# Patient Record
Sex: MALE | Race: WHITE | Employment: OTHER | URBAN - METROPOLITAN AREA
[De-identification: names, ages, dates, MRNs, and addresses within clinical notes are randomized per-mention and may not be internally consistent; named-entity substitution may affect disease eponyms.]

---

## 2017-01-04 ENCOUNTER — OFFICE VISIT (OUTPATIENT)
Dept: AUDIOLOGY | Facility: CLINIC | Age: 82
End: 2017-01-04
Payer: COMMERCIAL

## 2017-01-04 DIAGNOSIS — H90.5 SENSORINEURAL HEARING LOSS OF RIGHT EAR: ICD-10-CM

## 2017-01-04 DIAGNOSIS — H90.72 MIXED HEARING LOSS OF LEFT EAR: Primary | ICD-10-CM

## 2017-01-04 PROCEDURE — V5299 HEARING SERVICE: HCPCS | Performed by: AUDIOLOGIST

## 2017-01-04 PROCEDURE — 99207 ZZC NO CHARGE LOS: CPT | Performed by: AUDIOLOGIST

## 2017-01-04 NOTE — PROGRESS NOTES
Hearing Aid Check    SUBJECTIVE:  Aman Baker is a 82 year old male, was seen today to exchange his Phonak Audeo B50-R hearing aids for Audeo B50-13. Aman would like to exchange the hearing aids because he wants the flexibility of using traditional hearing aid batteries.        OBJECTIVE:  Otoscopy revealed clear ear canals bilaterally.     The new hearing aids were programmed based on the settings/gain of his rechargeable hearing aids. Real ear measures did not need to be completed today because his Cshell molds were moved to the new hearing aids.     Volume control was activated; the program button was deactivated.     A new purchase agreement was signed today. No charges for today's appointment as this was an exchange. The rechargeable hearing aids will be returned for credit.       PLAN: Return to clinic in 3 weeks for a hearing aid check.       Noemy Parnell, F-AAA   Clinical Audiologist, [place] #1008   1/4/2017

## 2017-01-25 ENCOUNTER — PRE VISIT (OUTPATIENT)
Dept: CARDIOLOGY | Facility: CLINIC | Age: 82
End: 2017-01-25

## 2017-01-25 NOTE — TELEPHONE ENCOUNTER
Last Assessment & Plan:     1. Chronic atrial fibrillation: s/p DC cardioversion 10/3/08, in atrial fibrillation since 2/7/09.    2. Asymptomatic, has tolerated medical mangement and chronic atrial fibrillation well and his INR level is therapeutic.    3. RTC in 1 year. He has regular appointments for routine health maintenance (BP, lipids) with his PCP. No changes to medications today.    Cullen Davis MD, PhD      Chart prep completed; patient is up to date on requested cardiac/lab testing. No further information or testing needed at this time.   MELISSA Silverman.M.A.

## 2017-01-27 ENCOUNTER — OFFICE VISIT (OUTPATIENT)
Dept: CARDIOLOGY | Facility: CLINIC | Age: 82
End: 2017-01-27
Payer: COMMERCIAL

## 2017-01-27 VITALS
OXYGEN SATURATION: 97 % | WEIGHT: 149 LBS | BODY MASS INDEX: 24.79 KG/M2 | HEART RATE: 75 BPM | SYSTOLIC BLOOD PRESSURE: 102 MMHG | DIASTOLIC BLOOD PRESSURE: 58 MMHG

## 2017-01-27 DIAGNOSIS — I48.20 CHRONIC ATRIAL FIBRILLATION (H): Primary | ICD-10-CM

## 2017-01-27 PROCEDURE — 99213 OFFICE O/P EST LOW 20 MIN: CPT | Performed by: INTERNAL MEDICINE

## 2017-01-27 RX ORDER — FUROSEMIDE 20 MG
20 TABLET ORAL EVERY OTHER DAY
Qty: 30 TABLET | Refills: 3 | Status: SHIPPED | OUTPATIENT
Start: 2017-01-27 | End: 2018-03-23

## 2017-01-27 ASSESSMENT — PAIN SCALES - GENERAL: PAINLEVEL: NO PAIN (0)

## 2017-01-27 NOTE — PATIENT INSTRUCTIONS
1.  Dr. Cullen Davis would like you to return for a cardiac follow up in 18 months (June 2018).  We will contact you regarding your appointment when the time draws closer or you may call [phone] to arrange an appointment.  Mean while, if you should have any questions or concerns regarding your heart health, please contact us.  Thank you for choosing [facility] for your care.      [facility] Cardiology - [place] Location    If you have any questions regarding to your visit please contact your care team:     Cardiology  Telephone Number   Randell Garza  Cardiology RN's.    Ciara Austin CMA [phone]    After hours: [phone].  [phone]   For scheduling appts:     [phone] or  [phone]    After hours: [phone]   For the Device Clinic (Pacemakers and ICD's)  RN's :  Kathleen Harrell   During business hours: [phone]  After business hours:  [phone]- select option 4.      If you need a medication refill please contact your pharmacy.  Please allow 3 business days for your refill to be completed.    As always, Thank you for trusting us with your health care needs!  _____________________________________________________________________

## 2017-01-27 NOTE — MR AVS SNAPSHOT
After Visit Summary   1/27/2017    Aman Baker    MRN: [id number]           Patient Information     Date Of Birth          5/6/1934        Visit Information        Provider Department      1/27/2017 8:30 AM Cullen Davis MD [facility] PHYSICIANS HEART AT [facility]        Today's Diagnoses     Chronic atrial fibrillation (H)    -  1       Care Instructions    1.  Dr. Cullen Davis would like you to return for a cardiac follow up in 18 months (June 2018).  We will contact you regarding your appointment when the time draws closer or you may call [phone] to arrange an appointment.  Mean while, if you should have any questions or concerns regarding your heart health, please contact us.  Thank you for choosing [facility] for your care.      [facility] Cardiology [facility] [place] [facility]    If you have any questions regarding to your visit please contact your care team:     Cardiology  Telephone Number   Randell Garza  Cardiology RN's.    Ciara Austin CMA [phone]    After hours: [phone].  [phone]   For scheduling appts:     [phone] or  [phone]    After hours: [phone]   For the Device Clinic (Pacemakers and ICD's)  RN's :  Kathleen Harrell   During business hours: [phone]  After business hours:  [phone]- select option 4.      If you need a medication refill please contact your pharmacy.  Please allow 3 business days for your refill to be completed.    As always, Thank you for trusting us with your health care needs!  _____________________________________________________________________            Follow-ups after your visit        Your next 10 appointments already scheduled     Feb 01, 2017 10:30 AM   Return Visit with GLADYS Parnell   [facility]    [address] [place] [id number] [phone]              Who to contact     If you have questions  "or need follow up information about today's clinic visit or your schedule please contact [facility] PHYSICIANS HEART AT [facility] directly at [phone].  Normal or non-critical lab and imaging results will be communicated to you by MyChart, letter or phone within 4 business days after the clinic has received the results. If you do not hear from us within 7 days, please contact the clinic through [employer] or phone. If you have a critical or abnormal lab result, we will notify you by phone as soon as possible.  Submit refill requests through [employer] or call your pharmacy and they will forward the refill request to us. Please allow 3 business days for your refill to be completed.          Additional Information About Your Visit        [employer] Information     [employer] lets you send messages to your doctor, view your test results, renew your prescriptions, schedule appointments and more. To sign up, go to [email or web address] . Click on \"Log in\" on the left side of the screen, which will take you to the Welcome page. Then click on \"Sign up Now\" on the right side of the page.     You will be asked to enter the access code listed below, as well as some personal information. Please follow the directions to create your username and password.     Your access code is: [email or web address]  Expires: 2017  9:07 AM     Your access code will  in 90 days. If you need help or a new code, please call your [place] clinic or [phone].        Care EveryWhere ID     This is your Care EveryWhere ID. This could be used by other organizations to access your [place] medical records  [id number]        Your Vitals Were     Pulse Pulse Oximetry                75 97%           Blood Pressure from Last 3 Encounters:   17 102/58   16 94/60   12/14/15 111/66    Weight from Last 3 Encounters:   17 67.586 kg (149 lb)   16 67.223 kg (148 lb 3.2 oz)   16 67.405 kg (148 lb 9.6 oz)            "   Today, you had the following     No orders found for display         Where to get your medicines      These medications were sent to [facility] [address] [facility] - [place], [place] - [address]  [address], [place] [address]     Phone:  [phone]    - furosemide 20 MG tablet       Primary Care Provider Office Phone # Fax #    Slade Eldridge [phone]       [facility] [address] [place] [address]        Thank you!     Thank you for choosing [facility] PHYSICIANS HEART AT [facility]  for your care. Our goal is always to provide you with excellent care. Hearing back from our patients is one way we can continue to improve our services. Please take a few minutes to complete the written survey that you may receive in the mail after your visit with us. Thank you!             Your Updated Medication List - Protect others around you: Learn how to safely use, store and throw away your medicines at www.disposemymeds.org.          This list is accurate as of: 1/27/17  9:07 AM.  Always use your most recent med list.                   Brand Name Dispense Instructions for use    calcitRIOL 0.25 MCG capsule    ROCALTROL     Take 1 capsule by mouth daily.       CALCIUM + D PO      Take 1 capsule daily       DAILY MULTIVITAMIN PO      Take 1 tablet daily       furosemide 20 MG tablet    LASIX    30 tablet    Take 1 tablet (20 mg) by mouth every other day       gabapentin 300 MG 24 hr tablet    GRALISE     Take 1 tablet twice daily       levothyroxine 50 MCG tablet    SYNTHROID/LEVOTHROID     Take 1 tablet by mouth daily.       magnesium 250 MG tablet      Take 1 tablet by mouth daily.       metoprolol 100 MG tablet    LOPRESSOR    180 tablet    Take 1 tablet (100 mg) by mouth 2 times daily       mycophenolate capsule     540 capsule    TAKE THREE CAPSULES BY MOUTH TWICE A DAY (ARIEL 1)       predniSONE 5 MG tablet    DELTASONE    90 tablet    TAKE ONE TABLET BY MOUTH EVERY DAY        simvastatin 20 MG tablet    ZOCOR    90 tablet    Take 1 tablet (20 mg) by mouth At Bedtime       UNABLE TO FIND      MEDICATION NAME: instaflex       vitamin D 1000 UNITS capsule      Take 1 capsule by mouth 2 times daily.       * warfarin 2.5 MG tablet    COUMADIN     Take 1 tablet daily as directed by the coumadin clinic       * COUMADIN 1 MG tablet   Generic drug:  warfarin      Take 1 tablet by mouth daily 2.5 mg M-T. Fri 1.25mg. Sat, Sun 2.5mg.       * Notice:  This list has 2 medication(s) that are the same as other medications prescribed for you. Read the directions carefully, and ask your doctor or other care provider to review them with you.

## 2017-01-27 NOTE — Clinical Note
1/27/2017      RE: Aman Baker  [address] [address]       Dear Colleague,    Thank you for the opportunity to participate in the care of your patient, Aman Baker, at the [facility] HEART AT [facility] at [facility]. Please see a copy of my visit note below.              CARDIOLOGY CLINIC FOLLOW UP    HPI:   Mr. Baker is an 83 yo very pleasant male with PMH significant for chronic asymptomatic atrial fibrillation seen in clinic for routine annual follow up. He is s/p unsuccessful cardioversion in October 2008. He has been in chronic atrial fibrillation since that time. He is rate controlled with Metoprolol, and continues to be on anticoagulation. He denies any angina/chest pain, palpitations, dizziness, exersional or resting dyspnea, bipedal edema, diaphoresis. His HR has been in range of 60-90's. His INR has been therapeutic in the 2.0 range. He continues to be active and denies any limitations in his ADLs. He experiences pain in his left knee and uses a cane. He is completing necessary dental work and is maintained on coumadin during the process without complications.    PAST MEDICAL HISTORY:  Past Medical History   Diagnosis Date     Chronic atrial fibrillation (H) 11/21/2012     Prostate cancer (H) 1/2013       CURRENT MEDICATIONS:  Current Outpatient Prescriptions   Medication Sig Dispense Refill     metoprolol (LOPRESSOR) 100 MG tablet Take 1 tablet (100 mg) by mouth 2 times daily 180 tablet 3     CELLCEPT 250 MG PO CAPSULE TAKE THREE CAPSULES BY MOUTH TWICE A DAY (ARIEL 1) 540 capsule 3     predniSONE (DELTASONE) 5 MG tablet TAKE ONE TABLET BY MOUTH EVERY DAY 90 tablet 3     furosemide (LASIX) 20 MG tablet Take 1 tablet (20 mg) by mouth every other day 15 tablet 11     UNABLE TO FIND MEDICATION NAME: instaflex       simvastatin (ZOCOR) 20 MG tablet Take 1 tablet (20 mg) by mouth At Bedtime 90 tablet 0     warfarin  (COUMADIN) 1 MG tablet Take 1 tablet by mouth daily 2.5 mg M-T. Fri 1.25mg. Sat, Sun 2.5mg.        Gabapentin, PHN, 300 MG TABS Take 1 tablet twice daily       calcitRIOL (ROCALTROL) 0.25 MCG capsule Take 1 capsule by mouth daily.       levothyroxine (SYNTHROID, LEVOTHROID) 50 MCG tablet Take 1 tablet by mouth daily.       warfarin (COUMADIN) 2.5 MG tablet Take 1 tablet daily as directed by the coumadin clinic       Multiple Vitamin (DAILY MULTIVITAMIN PO) Take 1 tablet daily       Calcium Carbonate-Vitamin D (CALCIUM + D PO) Take 1 capsule daily       Magnesium 250 MG tablet Take 1 tablet by mouth daily.       Cholecalciferol (VITAMIN D) 1000 UNIT capsule Take 1 capsule by mouth 2 times daily.         PAST SURGICAL HISTORY:  Past Surgical History   Procedure Laterality Date     Transplant  2004     liver transplant       ALLERGIES  Penicillins and Sulfa drugs    FAMILY HX:  History reviewed. No pertinent family history.    SOCIAL HX:  History     Social History     Marital Status:      Spouse Name: N/A     Number of Children: N/A     Years of Education: N/A     Social History Main Topics     Smoking status: Never Smoker      Smokeless tobacco: Never Used     Alcohol Use: No     Drug Use: No     Sexual Activity: Not on file     Other Topics Concern     Not on file     Social History Narrative       ROS:  Constitutional: No fever, chills, or sweats. No weight gain/loss.   ENT: No visual disturbance, ear ache, epistaxis, sore throat.   Allergies/Immunologic: Negative.   Respiratory: No cough, hemoptysis.   Cardiovascular: As per HPI.   GI: No nausea, vomiting, hematemesis, melena, or hematochezia.   : No urinary frequency, dysuria, or hematuria.   Integument: Negative.   Psychiatric: Negative.   Neuro: Negative.   Endocrinology: Negative.   Musculoskeletal: No myalgia.    VITAL SIGNS:  /58 mmHg  Pulse 75  Wt 67.586 kg (149 lb)  SpO2 97%  Body mass index is 24.79 kg/(m^2).  Wt Readings from Last 2  Encounters:   17 67.586 kg (149 lb)   16 67.223 kg (148 lb 3.2 oz)       PHYSICAL EXAM  Aman Baker is a 80 year old male in no acute distress.  HEENT: Unremarkable.  Neck: JVP normal.  Carotids +4/4 bilaterally without bruits.  Lungs: CTA.  Cor: Irregularly irregular rate and rhythm, rate 80-90 bpm. Normal S1 and S2.  No murmur, rub, or gallop.  PMI in Lf 5th ICS.  Abd: Soft, nontender, nondistended.  NABS.  No pulsatile mass.  Extremities: No C/C/E.  Pulses +4/4 symmetric in upper and lower extremities.  Neuro: Grossly intact.    LABS    WBC      8.0   2014  RBC     4.19   2014  HGB     12.3   2014  HCT     39.5   2014  No components found with this name: mct  MCV       94   2014  MCH     29.4   2014  MCHC     31.1   2014  RDW     13.8   2014  PLT      217   2014   Recent Labs   Lab Test  14   0933  01/29/10   0836   NA  142  140   POTASSIUM  4.4  4.1   CHLORIDE  103  103   CO2  32  30   ANIONGAP  7  7   GLC  110*  93   BUN  30  28   CR  1.39*  1.38*   SG  9.4  9.5     Recent Labs   Lab Test  07/16/10   1033  09   0920   CHOL  145  237*   HDL  40  39*   LDL  75  160*   TRIG  151*  188*   CHOLHDLRATIO  3.6  6.0*      EC2009  Atrial fibrillation      ECHOCARDIOGRAM: 2009  Left ventricular size is normal. The Ejection Fraction is estimated at 60-65%.No regional wall motion abnormalities are seen. Trace to mild aortic insufficiency is present. Mild right ventricular dilation is present. Global right ventricular function is normal. Mild to moderate tricuspid insufficiency is present. Pulmonary artery systolic pressure is normal. Mild to moderate biatrial enlargement is present.    Left Ventricle  Left ventricular size is normal.Left ventricular wall thickness is normal.The Ejection Fraction is estimated at 60-65%.  No regional wall motion abnormalities are seen.    Right Ventricle  Global right ventricular function is normal.Mild  right ventricular dilation is present.    Atria  Mild to moderate biatrial enlargement is present.    Mitral Valve  Trace mitral insufficiency is present.    Aortic Valve  The aortic valve is tricuspid.Mild aortic valve sclerosis is present.Trace to mild aortic insufficiency is present.    Tricuspid Valve  Mild to moderate tricuspid insufficiency is present.Pulmonary artery systolic pressure is normal.    Vessels  The aorta root is normal.The inferior vena cava is normal.    Pericardium  No pericardial effusion is present.    MMode 2D Measurements & Calculations  LVIDd: 3.9 cm  LVIDs: 1.5 cm  FS: 63 %  Ao root diam: 3.3 cm  LA dimension: 5.3 cm  asc Aorta: 3.6 cm  LA/Ao: 1.6   Time Measurements  Aortic HR: 76 BPM  Doppler Measurements & Calculations  MV E point: 80 cm/sec  LV IVRT: 0.13 sec  MV dec time: 0.15 sec  Ao V2 max: 101 cm/sec  Ao max P.1 mmHg  LV V1 max: 89 cm/sec  LV V1 VTI: 16 cm  TR Max P mmHg    ASSESSMENT & PLAN:  1. Chronic atrial fibrillation: s/p DC cardioversion 10/3/08, in atrial fibrillation since 09.   2. Asymptomatic, has tolerated medical mangement and chronic atrial fibrillation well and his INR level is therapeutic.   3. RTC in 18 months. He has regular appointments for routine health maintenance (BP, lipids) with his PCP. No changes to medications today.    Cullen Davis MD, PhD

## 2017-01-27 NOTE — PROGRESS NOTES
CARDIOLOGY CLINIC FOLLOW UP    HPI:   Mr. Baker is an 81 yo very pleasant male with PMH significant for chronic asymptomatic atrial fibrillation seen in clinic for routine annual follow up. He is s/p unsuccessful cardioversion in October 2008. He has been in chronic atrial fibrillation since that time. He is rate controlled with Metoprolol, and continues to be on anticoagulation. He denies any angina/chest pain, palpitations, dizziness, exersional or resting dyspnea, bipedal edema, diaphoresis. His HR has been in range of 60-90's. His INR has been therapeutic in the 2.0 range. He continues to be active and denies any limitations in his ADLs. He experiences pain in his left knee and uses a cane. He is completing necessary dental work and is maintained on coumadin during the process without complications.    PAST MEDICAL HISTORY:  Past Medical History   Diagnosis Date     Chronic atrial fibrillation (H) 11/21/2012     Prostate cancer (H) 1/2013       CURRENT MEDICATIONS:  Current Outpatient Prescriptions   Medication Sig Dispense Refill     metoprolol (LOPRESSOR) 100 MG tablet Take 1 tablet (100 mg) by mouth 2 times daily 180 tablet 3     CELLCEPT 250 MG PO CAPSULE TAKE THREE CAPSULES BY MOUTH TWICE A DAY (ARIEL 1) 540 capsule 3     predniSONE (DELTASONE) 5 MG tablet TAKE ONE TABLET BY MOUTH EVERY DAY 90 tablet 3     furosemide (LASIX) 20 MG tablet Take 1 tablet (20 mg) by mouth every other day 15 tablet 11     UNABLE TO FIND MEDICATION NAME: instaflex       simvastatin (ZOCOR) 20 MG tablet Take 1 tablet (20 mg) by mouth At Bedtime 90 tablet 0     warfarin (COUMADIN) 1 MG tablet Take 1 tablet by mouth daily 2.5 mg M-T. Fri 1.25mg. Sat, Sun 2.5mg.        Gabapentin, PHN, 300 MG TABS Take 1 tablet twice daily       calcitRIOL (ROCALTROL) 0.25 MCG capsule Take 1 capsule by mouth daily.       levothyroxine (SYNTHROID, LEVOTHROID) 50 MCG tablet Take 1 tablet by mouth daily.       warfarin (COUMADIN) 2.5 MG tablet Take 1  tablet daily as directed by the coumadin clinic       Multiple Vitamin (DAILY MULTIVITAMIN PO) Take 1 tablet daily       Calcium Carbonate-Vitamin D (CALCIUM + D PO) Take 1 capsule daily       Magnesium 250 MG tablet Take 1 tablet by mouth daily.       Cholecalciferol (VITAMIN D) 1000 UNIT capsule Take 1 capsule by mouth 2 times daily.         PAST SURGICAL HISTORY:  Past Surgical History   Procedure Laterality Date     Transplant  2004     liver transplant       ALLERGIES  Penicillins and Sulfa drugs    FAMILY HX:  History reviewed. No pertinent family history.    SOCIAL HX:  History     Social History     Marital Status:      Spouse Name: N/A     Number of Children: N/A     Years of Education: N/A     Social History Main Topics     Smoking status: Never Smoker      Smokeless tobacco: Never Used     Alcohol Use: No     Drug Use: No     Sexual Activity: Not on file     Other Topics Concern     Not on file     Social History Narrative       ROS:  Constitutional: No fever, chills, or sweats. No weight gain/loss.   ENT: No visual disturbance, ear ache, epistaxis, sore throat.   Allergies/Immunologic: Negative.   Respiratory: No cough, hemoptysis.   Cardiovascular: As per HPI.   GI: No nausea, vomiting, hematemesis, melena, or hematochezia.   : No urinary frequency, dysuria, or hematuria.   Integument: Negative.   Psychiatric: Negative.   Neuro: Negative.   Endocrinology: Negative.   Musculoskeletal: No myalgia.    VITAL SIGNS:  /58 mmHg  Pulse 75  Wt 67.586 kg (149 lb)  SpO2 97%  Body mass index is 24.79 kg/(m^2).  Wt Readings from Last 2 Encounters:   01/27/17 67.586 kg (149 lb)   11/01/16 67.223 kg (148 lb 3.2 oz)       PHYSICAL EXAM  Aman Baker is a 80 year old male in no acute distress.  HEENT: Unremarkable.  Neck: JVP normal.  Carotids +4/4 bilaterally without bruits.  Lungs: CTA.  Cor: Irregularly irregular rate and rhythm, rate 80-90 bpm. Normal S1 and S2.  No murmur, rub, or gallop.  PMI  in Lf 5th ICS.  Abd: Soft, nontender, nondistended.  NABS.  No pulsatile mass.  Extremities: No C/C/E.  Pulses +4/4 symmetric in upper and lower extremities.  Neuro: Grossly intact.    LABS    WBC      8.0   2014  RBC     4.19   2014  HGB     12.3   2014  HCT     39.5   2014  No components found with this name: mct  MCV       94   2014  MCH     29.4   2014  MCHC     31.1   2014  RDW     13.8   2014  PLT      217   2014   Recent Labs   Lab Test  14   0933  01/29/10   0836   NA  142  140   POTASSIUM  4.4  4.1   CHLORIDE  103  103   CO2  32  30   ANIONGAP  7  7   GLC  110*  93   BUN  30  28   CR  1.39*  1.38*   SG  9.4  9.5     Recent Labs   Lab Test  07/16/10   1033  09   0920   CHOL  145  237*   HDL  40  39*   LDL  75  160*   TRIG  151*  188*   CHOLHDLRATIO  3.6  6.0*      EC2009  Atrial fibrillation      ECHOCARDIOGRAM: 2009  Left ventricular size is normal. The Ejection Fraction is estimated at 60-65%.No regional wall motion abnormalities are seen. Trace to mild aortic insufficiency is present. Mild right ventricular dilation is present. Global right ventricular function is normal. Mild to moderate tricuspid insufficiency is present. Pulmonary artery systolic pressure is normal. Mild to moderate biatrial enlargement is present.    Left Ventricle  Left ventricular size is normal.Left ventricular wall thickness is normal.The Ejection Fraction is estimated at 60-65%.  No regional wall motion abnormalities are seen.    Right Ventricle  Global right ventricular function is normal.Mild right ventricular dilation is present.    Atria  Mild to moderate biatrial enlargement is present.    Mitral Valve  Trace mitral insufficiency is present.    Aortic Valve  The aortic valve is tricuspid.Mild aortic valve sclerosis is present.Trace to mild aortic insufficiency is present.    Tricuspid Valve  Mild to moderate tricuspid insufficiency is present.Pulmonary  artery systolic pressure is normal.    Vessels  The aorta root is normal.The inferior vena cava is normal.    Pericardium  No pericardial effusion is present.    MMode 2D Measurements & Calculations  LVIDd: 3.9 cm  LVIDs: 1.5 cm  FS: 63 %  Ao root diam: 3.3 cm  LA dimension: 5.3 cm  asc Aorta: 3.6 cm  LA/Ao: 1.6   Time Measurements  Aortic HR: 76 BPM  Doppler Measurements & Calculations  MV E point: 80 cm/sec  LV IVRT: 0.13 sec  MV dec time: 0.15 sec  Ao V2 max: 101 cm/sec  Ao max P.1 mmHg  LV V1 max: 89 cm/sec  LV V1 VTI: 16 cm  TR Max P mmHg    ASSESSMENT & PLAN:  1. Chronic atrial fibrillation: s/p DC cardioversion 10/3/08, in atrial fibrillation since 09.   2. Asymptomatic, has tolerated medical mangement and chronic atrial fibrillation well and his INR level is therapeutic.   3. RTC in 18 months. He has regular appointments for routine health maintenance (BP, lipids) with his PCP. No changes to medications today.    Cullen Davis MD, PhD

## 2017-01-27 NOTE — NURSING NOTE
Med Reconcile: Reviewed and verified all current medications with the patient. The updated medication list was printed and given to the patient.    Return Appointment: Patient given instructions regarding scheduling next clinic visit, in June 2018. Patient demonstrated understanding of this information and agreed to call with further questions or concerns.    Patient stated he understood all health information given and agreed to call with further questions or concerns.    Randell Lugo RN

## 2017-01-27 NOTE — NURSING NOTE
"Chief Complaint   Patient presents with     RECHECK     annual cardiac follow up for Chronic atrial fibrillation, Essential hypertension and Liver replaced by transplant;. States feeling good heart wise       Initial /58 mmHg  Pulse 75  Wt 149 lb (67.586 kg)  SpO2 97% Estimated body mass index is 24.79 kg/(m^2) as calculated from the following:    Height as of 11/1/16: 5' 5\" (1.651 m).    Weight as of this encounter: 149 lb (67.586 kg)..  BP completed using cuff size: jake Austin CMA    "

## 2017-02-01 ENCOUNTER — OFFICE VISIT (OUTPATIENT)
Dept: AUDIOLOGY | Facility: CLINIC | Age: 82
End: 2017-02-01
Payer: COMMERCIAL

## 2017-02-01 DIAGNOSIS — H90.5 SENSORINEURAL HEARING LOSS OF RIGHT EAR: ICD-10-CM

## 2017-02-01 DIAGNOSIS — H90.72 MIXED HEARING LOSS OF LEFT EAR: Primary | ICD-10-CM

## 2017-02-01 PROCEDURE — V5275 EAR IMPRESSION: HCPCS | Performed by: AUDIOLOGIST

## 2017-02-01 PROCEDURE — V5299 HEARING SERVICE: HCPCS | Performed by: AUDIOLOGIST

## 2017-02-01 PROCEDURE — 99207 ZZC NO CHARGE LOS: CPT | Performed by: AUDIOLOGIST

## 2017-02-01 NOTE — PROGRESS NOTES
"Hearing Aid Check    SUBJECTIVE:  Aman Baker is a 82 year old male, was seen today for hearing aid check after he was fit with Phonak RICs with [employer]. Patient reports that the left mold has been causing pain in the dakota bowl of his left ear. He also reports that the hearing aid frequency fall off, and he would like to go back to ITE hearing aids.       OBJECTIVE:  Otoscopy revealed clear ear canals bilaterally.     Discussed going back to an ITE with patient, patient was informed that because he has switched hearing aid styles once already (rechargeable to standard battery), this would likely be the last time we could switch hearing aid styles; patient understands. He reports that he likes the helix lock on his current PRADEEP molds, so this feature will be included in the ITE order.     A new ear impression was made today, without incident, for the left ear given patient complaints of discomfort with current mold. Patient reports that his right mold \"fits perfectly.\"       PLAN: Return to clinic in 3 weeks to fit ITE hearing aids.         Noemy Parnell, F-AAA   Clinical Audiologist, [place] #[id number]   2/1/2017        "

## 2017-02-09 ENCOUNTER — TELEPHONE (OUTPATIENT)
Dept: TRANSPLANT | Facility: CLINIC | Age: 82
End: 2017-02-09

## 2017-03-03 ENCOUNTER — OFFICE VISIT (OUTPATIENT)
Dept: AUDIOLOGY | Facility: CLINIC | Age: 82
End: 2017-03-03
Payer: COMMERCIAL

## 2017-03-03 ENCOUNTER — TELEPHONE (OUTPATIENT)
Dept: TRANSPLANT | Facility: CLINIC | Age: 82
End: 2017-03-03

## 2017-03-03 DIAGNOSIS — H90.6 MIXED HEARING LOSS, BILATERAL: Primary | ICD-10-CM

## 2017-03-03 PROCEDURE — V5299 HEARING SERVICE: HCPCS | Performed by: AUDIOLOGIST

## 2017-03-03 PROCEDURE — 99207 ZZC NO CHARGE LOS: CPT | Performed by: AUDIOLOGIST

## 2017-03-03 NOTE — MR AVS SNAPSHOT
"              After Visit Summary   3/3/2017    Aman Baker    MRN: [id number]           Patient Information     Date Of Birth          1934        Visit Information        Provider Department      3/3/2017 3:30 PM Katia Freed AuD [facility]        Today's Diagnoses     Mixed hearing loss, bilateral    -  1       Follow-ups after your visit        Who to contact     If you have questions or need follow up information about today's clinic visit or your schedule please contact [facility] directly at [phone].  Normal or non-critical lab and imaging results will be communicated to you by Cosyforyouhart, letter or phone within 4 business days after the clinic has received the results. If you do not hear from us within 7 days, please contact the clinic through Cosyforyouhart or phone. If you have a critical or abnormal lab result, we will notify you by phone as soon as possible.  Submit refill requests through LaunchGram or call your pharmacy and they will forward the refill request to us. Please allow 3 business days for your refill to be completed.          Additional Information About Your Visit        MyChart Information     LaunchGram lets you send messages to your doctor, view your test results, renew your prescriptions, schedule appointments and more. To sign up, go to [email or web address] . Click on \"Log in\" on the left side of the screen, which will take you to the Welcome page. Then click on \"Sign up Now\" on the right side of the page.     You will be asked to enter the access code listed below, as well as some personal information. Please follow the directions to create your username and password.     Your access code is: [email or web address]  Expires: 2017  9:07 AM     Your access code will  in 90 days. If you need help or a new code, please call your [facility] or [phone].        Care EveryWhere ID     This is your Care EveryWhere ID. This could be used by other " organizations to access your [place] medical records  [id number]         Blood Pressure from Last 3 Encounters:   01/27/17 102/58   08/19/16 94/60   12/14/15 111/66    Weight from Last 3 Encounters:   01/27/17 149 lb (67.6 kg)   11/01/16 148 lb 3.2 oz (67.2 kg)   08/19/16 148 lb 9.6 oz (67.4 kg)              We Performed the Following     HEARING AID CHECK/NO CHARGE        Primary Care Provider Office Phone # Fax #    Slade SandovalJavier [phone]       [facility] [address] [place] [address]        Thank you!     Thank you for choosing [facility]  for your care. Our goal is always to provide you with excellent care. Hearing back from our patients is one way we can continue to improve our services. Please take a few minutes to complete the written survey that you may receive in the mail after your visit with us. Thank you!             Your Updated Medication List - Protect others around you: Learn how to safely use, store and throw away your medicines at www.disposemymeds.org.          This list is accurate as of: 3/3/17  4:25 PM.  Always use your most recent med list.                   Brand Name Dispense Instructions for use    calcitRIOL 0.25 MCG capsule    ROCALTROL     Take 1 capsule by mouth daily.       CALCIUM + D PO      Take 1 capsule daily       DAILY MULTIVITAMIN PO      Take 1 tablet daily       furosemide 20 MG tablet    LASIX    30 tablet    Take 1 tablet (20 mg) by mouth every other day       gabapentin 300 MG 24 hr tablet    GRALISE     Take 1 tablet twice daily       levothyroxine 50 MCG tablet    SYNTHROID/LEVOTHROID     Take 1 tablet by mouth daily.       magnesium 250 MG tablet      Take 1 tablet by mouth daily.       metoprolol 100 MG tablet    LOPRESSOR    180 tablet    Take 1 tablet (100 mg) by mouth 2 times daily       mycophenolate capsule     540 capsule    TAKE THREE CAPSULES BY MOUTH TWICE A DAY (ARIEL 1)       predniSONE 5 MG tablet     DELTASONE    90 tablet    TAKE ONE TABLET BY MOUTH EVERY DAY       simvastatin 20 MG tablet    ZOCOR    90 tablet    Take 1 tablet (20 mg) by mouth At Bedtime       UNABLE TO FIND      MEDICATION NAME: instaflex       vitamin D 1000 UNITS capsule      Take 1 capsule by mouth 2 times daily.       * warfarin 2.5 MG tablet    COUMADIN     Take 1 tablet daily as directed by the coumadin clinic       * COUMADIN 1 MG tablet   Generic drug:  warfarin      Take 1 tablet by mouth daily 2.5 mg M-T. Fri 1.25mg. Sat, Sun 2.5mg.       * Notice:  This list has 2 medication(s) that are the same as other medications prescribed for you. Read the directions carefully, and ask your doctor or other care provider to review them with you.

## 2017-03-03 NOTE — PROGRESS NOTES
Hearing Aid Check    SUBJECTIVE:  Aman Baker is a 82 year old male, was seen today for the fitting of Phonak Virto V50-13 hearing aids, these are being exchanged for Phonak Audeo B50-13 aids.      OBJECTIVE:  Otoscopy revealed clear ear canals bilaterally.     Real Ear Measures were run and NAL-NL2 targets were well matched.     A new purchase agreement was signed and a copy was given to the patient.     Patient took his Audeo earmolds with him today and will keep them since they are not returnable.       PLAN: Patient is to call next week with his decision to keep the Virtoes or return to the Audeoes. Return to clinic in 6 months.    No charges today as this was an exchange.       Noemy Parnell, -AAA   Clinical Audiologist, [place] #9530   3/3/2017

## 2017-03-06 DIAGNOSIS — Z94.4 LIVER REPLACED BY TRANSPLANT (H): Primary | ICD-10-CM

## 2017-03-08 ENCOUNTER — TELEPHONE (OUTPATIENT)
Dept: AUDIOLOGY | Facility: CLINIC | Age: 82
End: 2017-03-08

## 2017-03-08 NOTE — TELEPHONE ENCOUNTER
"Newton called to inform me that he will keep the ITE hearing aids. He reports that the he likes the hearing aids, although he is unsure about the \"controls\" on the hearing aid. Patient and I agreed that I would go ahead and return the PRADEEP hearing aids. Patient plans to schedule a follow up appointment soon; he will call the scheduling line when he is ready to schedule.       Noemy Parnell, F-AAA   Clinical Audiologist, [place] #4571   3/8/2017    "

## 2017-07-14 DIAGNOSIS — Z94.4 LIVER REPLACED BY TRANSPLANT (H): ICD-10-CM

## 2017-07-17 RX ORDER — PREDNISONE 5 MG/1
TABLET ORAL
Qty: 90 TABLET | Refills: 3 | Status: SHIPPED | OUTPATIENT
Start: 2017-07-17 | End: 2019-01-01

## 2017-10-30 DIAGNOSIS — Z94.4 LIVER REPLACED BY TRANSPLANT (H): ICD-10-CM

## 2017-10-30 RX ORDER — MYCOPHENOLATE MOFETIL 250 MG/1
CAPSULE ORAL
Qty: 540 CAPSULE | Refills: 3 | Status: CANCELLED | OUTPATIENT
Start: 2017-10-30

## 2017-10-30 RX ORDER — MYCOPHENOLATE MOFETIL 250 MG/1
750 CAPSULE ORAL 2 TIMES DAILY
Qty: 540 CAPSULE | Refills: 3 | Status: SHIPPED | OUTPATIENT
Start: 2017-10-30 | End: 2019-01-01

## 2017-12-29 ENCOUNTER — TELEPHONE (OUTPATIENT)
Dept: TRANSPLANT | Facility: CLINIC | Age: 82
End: 2017-12-29

## 2017-12-29 NOTE — TELEPHONE ENCOUNTER
Chart review  Pt is overdue for labs and appt with hepatology. Will send transplant overdue letter.

## 2018-01-01 ENCOUNTER — TELEPHONE (OUTPATIENT)
Dept: AUDIOLOGY | Facility: CLINIC | Age: 83
End: 2018-01-01

## 2018-01-01 ENCOUNTER — TELEPHONE (OUTPATIENT)
Dept: INTERNAL MEDICINE | Facility: CLINIC | Age: 83
End: 2018-01-01

## 2018-01-01 ENCOUNTER — OFFICE VISIT (OUTPATIENT)
Dept: FAMILY MEDICINE | Facility: CLINIC | Age: 83
End: 2018-01-01
Payer: COMMERCIAL

## 2018-01-01 VITALS
OXYGEN SATURATION: 98 % | HEIGHT: 62 IN | DIASTOLIC BLOOD PRESSURE: 68 MMHG | BODY MASS INDEX: 24.44 KG/M2 | TEMPERATURE: 98.9 F | WEIGHT: 132.8 LBS | RESPIRATION RATE: 16 BRPM | SYSTOLIC BLOOD PRESSURE: 111 MMHG | HEART RATE: 98 BPM

## 2018-01-01 DIAGNOSIS — M47.816 SPONDYLOSIS OF LUMBAR REGION WITHOUT MYELOPATHY OR RADICULOPATHY: ICD-10-CM

## 2018-01-01 DIAGNOSIS — I48.20 CHRONIC ATRIAL FIBRILLATION (H): ICD-10-CM

## 2018-01-01 DIAGNOSIS — Z94.4 LIVER REPLACED BY TRANSPLANT (H): ICD-10-CM

## 2018-01-01 DIAGNOSIS — R35.0 URINARY FREQUENCY: ICD-10-CM

## 2018-01-01 DIAGNOSIS — E88.01 ALPHA-1-ANTITRYPSIN DEFICIENCY (H): ICD-10-CM

## 2018-01-01 DIAGNOSIS — M80.00XS AGE-RELATED OSTEOPOROSIS WITH CURRENT PATHOLOGICAL FRACTURE, SEQUELA: Primary | ICD-10-CM

## 2018-01-01 DIAGNOSIS — L03.116 CELLULITIS OF LEFT LOWER EXTREMITY: Primary | ICD-10-CM

## 2018-01-01 DIAGNOSIS — C61 PROSTATE CANCER (H): ICD-10-CM

## 2018-01-01 DIAGNOSIS — M17.0 PRIMARY OSTEOARTHRITIS OF BOTH KNEES: ICD-10-CM

## 2018-01-01 LAB
ALBUMIN SERPL-MCNC: 3.6 G/DL (ref 3.4–5)
ALP SERPL-CCNC: 103 U/L (ref 40–150)
ALT SERPL W P-5'-P-CCNC: 30 U/L (ref 0–70)
ANION GAP SERPL CALCULATED.3IONS-SCNC: 6 MMOL/L (ref 3–14)
AST SERPL W P-5'-P-CCNC: 18 U/L (ref 0–45)
BILIRUB DIRECT SERPL-MCNC: 0.1 MG/DL (ref 0–0.2)
BILIRUB SERPL-MCNC: 0.4 MG/DL (ref 0.2–1.3)
BUN SERPL-MCNC: 23 MG/DL (ref 7–30)
CALCIUM SERPL-MCNC: 9 MG/DL (ref 8.5–10.1)
CHLORIDE SERPL-SCNC: 102 MMOL/L (ref 94–109)
CO2 SERPL-SCNC: 29 MMOL/L (ref 20–32)
CREAT SERPL-MCNC: 1.12 MG/DL (ref 0.66–1.25)
ERYTHROCYTE [DISTWIDTH] IN BLOOD BY AUTOMATED COUNT: 14.8 % (ref 10–15)
GFR SERPL CREATININE-BSD FRML MDRD: 60 ML/MIN/{1.73_M2}
GLUCOSE SERPL-MCNC: 137 MG/DL (ref 70–99)
HCT VFR BLD AUTO: 36.9 % (ref 40–53)
HGB BLD-MCNC: 11.5 G/DL (ref 13.3–17.7)
MAGNESIUM SERPL-MCNC: 1.9 MG/DL (ref 1.6–2.3)
MCH RBC QN AUTO: 30.3 PG (ref 26.5–33)
MCHC RBC AUTO-ENTMCNC: 31.2 G/DL (ref 31.5–36.5)
MCV RBC AUTO: 97 FL (ref 78–100)
PLATELET # BLD AUTO: 244 10E9/L (ref 150–450)
POTASSIUM SERPL-SCNC: 4.1 MMOL/L (ref 3.4–5.3)
PROT SERPL-MCNC: 7.4 G/DL (ref 6.8–8.8)
RBC # BLD AUTO: 3.79 10E12/L (ref 4.4–5.9)
SODIUM SERPL-SCNC: 137 MMOL/L (ref 133–144)
WBC # BLD AUTO: 5.9 10E9/L (ref 4–11)

## 2018-01-01 PROCEDURE — 85027 COMPLETE CBC AUTOMATED: CPT | Performed by: INTERNAL MEDICINE

## 2018-01-01 PROCEDURE — 80048 BASIC METABOLIC PNL TOTAL CA: CPT | Performed by: INTERNAL MEDICINE

## 2018-01-01 PROCEDURE — 36415 COLL VENOUS BLD VENIPUNCTURE: CPT | Performed by: INTERNAL MEDICINE

## 2018-01-01 PROCEDURE — 99214 OFFICE O/P EST MOD 30 MIN: CPT | Performed by: INTERNAL MEDICINE

## 2018-01-01 PROCEDURE — 80076 HEPATIC FUNCTION PANEL: CPT | Performed by: INTERNAL MEDICINE

## 2018-01-01 PROCEDURE — 83735 ASSAY OF MAGNESIUM: CPT | Performed by: INTERNAL MEDICINE

## 2018-01-01 RX ORDER — GABAPENTIN 300 MG/1
300 CAPSULE ORAL 3 TIMES DAILY
Qty: 270 CAPSULE | Refills: 1 | Status: SHIPPED | OUTPATIENT
Start: 2018-01-01 | End: 2019-01-01

## 2018-01-01 RX ORDER — ALENDRONATE SODIUM 70 MG/1
70 TABLET ORAL
COMMUNITY
End: 2018-01-01

## 2018-01-01 RX ORDER — APIXABAN 2.5 MG/1
TABLET, FILM COATED ORAL
Qty: 180 TABLET | Refills: 1 | Status: SHIPPED | OUTPATIENT
Start: 2018-01-01

## 2018-01-01 RX ORDER — TRAMADOL HYDROCHLORIDE 50 MG/1
TABLET ORAL
Qty: 60 TABLET | Refills: 1 | Status: SHIPPED | OUTPATIENT
Start: 2018-01-01 | End: 2019-01-01

## 2018-01-01 RX ORDER — ALENDRONATE SODIUM 70 MG/1
TABLET ORAL
Qty: 12 TABLET | Refills: 3 | Status: SHIPPED | OUTPATIENT
Start: 2018-01-01 | End: 2019-01-01

## 2018-01-01 RX ORDER — ALENDRONATE SODIUM 70 MG/1
TABLET ORAL
Qty: 12 TABLET | Refills: 0
Start: 2018-01-01

## 2018-01-01 ASSESSMENT — PAIN SCALES - GENERAL: PAINLEVEL: SEVERE PAIN (7)

## 2018-02-01 ENCOUNTER — TRANSFERRED RECORDS (OUTPATIENT)
Dept: HEALTH INFORMATION MANAGEMENT | Facility: CLINIC | Age: 83
End: 2018-02-01

## 2018-02-05 ENCOUNTER — TELEPHONE (OUTPATIENT)
Dept: TRANSPLANT | Facility: CLINIC | Age: 83
End: 2018-02-05

## 2018-02-05 NOTE — TELEPHONE ENCOUNTER
Sharri from [facility], received call from Pt's niece re:we have  Not been  receiving labs.  Sharri looked back and has been getting labs with them, but not transplant labs only PCP labs and when he has been inpt. She will fax labs drawn in 1/4/18., and she will call pt family back and tell them he needs to tell lab to get tx labs also.

## 2018-02-22 ENCOUNTER — TRANSFERRED RECORDS (OUTPATIENT)
Dept: HEALTH INFORMATION MANAGEMENT | Facility: CLINIC | Age: 83
End: 2018-02-22

## 2018-03-19 RX ORDER — FUROSEMIDE 20 MG
20 TABLET ORAL EVERY OTHER DAY
Qty: 30 TABLET | Refills: 3 | Status: CANCELLED | OUTPATIENT
Start: 2018-03-19

## 2018-03-20 ENCOUNTER — TELEPHONE (OUTPATIENT)
Dept: TRANSPLANT | Facility: CLINIC | Age: 83
End: 2018-03-20

## 2018-03-20 NOTE — TELEPHONE ENCOUNTER
"Transplant Social Work Services Phone Call      Data: Received call from patient's daughter Kathleen regarding the high cost of patient's Cellcept.  Intervention/Education: Provided education about [facility] program for free Cellcept.  Mailed Patient Consent form to Kathleen.  Recommended Senior Linkage Line for additional health insurance counseling.  Informed Kathleen that Aman is past due for labs and hepatology f/u.  I provided contact information for Rody Kemp, Transplant Coordinator, and Tabitha Vyas, Post Transplant , and recommended she contact Tabitha to schedule Aman an appointment with Dr. Matt.   Assessment: Patient changed his health insurance recently.  He now has a Medicare replacement policy and he is responsible for 20% of the cost of his immunosuppression medications.  We will first try to apply for [employer]'s assistance program.  If he is denied, generic medication will be prescribed to provide Aman with a lower cost.   Aman has not been seen by Dr. Matt August 2016.  His daughter reports last year was very difficult for Aman.  He has been the primary care giver for his wife who has dementia.  His wife has since moved to a care facility.  Kathleen and her brother are trying to coordinate their father's care because he is \"so overwhelmed.\"  Plan: I will submit patient's entire application to [employer] when the Patient Consent is received.        PARESH Heard, [facility]  Liver Transplant   Phone [phone]  Pager [phone]       Addendum on 3/28/18: Faxed completed SMN form to [employer].  Addendum on 4/9/18: Received PAN form from patient's daughter.  Faxed to [employer].  Left a voice message for daughter Kathleen with this information.  Addendum on 4/23/18: Called [employer] to f/u on application. Application remains pending in the \"benefit investigation.\"   Addendum on 4/30/18: Received fax from [employer].  Patient is approved for free " Cellcept and a shipment will be coordinated with patient. Notified patient's daughter Kathleen and explained how to order medication, and when re-enrollment would be needed.

## 2018-03-23 ENCOUNTER — OFFICE VISIT (OUTPATIENT)
Dept: FAMILY MEDICINE | Facility: CLINIC | Age: 83
End: 2018-03-23
Payer: COMMERCIAL

## 2018-03-23 VITALS
OXYGEN SATURATION: 98 % | HEART RATE: 83 BPM | DIASTOLIC BLOOD PRESSURE: 68 MMHG | WEIGHT: 126 LBS | BODY MASS INDEX: 22.32 KG/M2 | HEIGHT: 63 IN | RESPIRATION RATE: 16 BRPM | SYSTOLIC BLOOD PRESSURE: 98 MMHG

## 2018-03-23 DIAGNOSIS — Z23 NEED FOR PROPHYLACTIC VACCINATION WITH TETANUS-DIPHTHERIA (TD): ICD-10-CM

## 2018-03-23 DIAGNOSIS — D64.9 ANEMIA, UNSPECIFIED TYPE: ICD-10-CM

## 2018-03-23 DIAGNOSIS — I48.20 CHRONIC ATRIAL FIBRILLATION (H): ICD-10-CM

## 2018-03-23 DIAGNOSIS — M80.00XS AGE-RELATED OSTEOPOROSIS WITH CURRENT PATHOLOGICAL FRACTURE, SEQUELA: ICD-10-CM

## 2018-03-23 DIAGNOSIS — E11.9 TYPE 2 DIABETES MELLITUS WITHOUT COMPLICATION, WITHOUT LONG-TERM CURRENT USE OF INSULIN (H): ICD-10-CM

## 2018-03-23 DIAGNOSIS — E88.01 ALPHA-1-ANTITRYPSIN DEFICIENCY (H): ICD-10-CM

## 2018-03-23 DIAGNOSIS — D47.2 MGUS (MONOCLONAL GAMMOPATHY OF UNKNOWN SIGNIFICANCE): ICD-10-CM

## 2018-03-23 DIAGNOSIS — E03.9 ACQUIRED HYPOTHYROIDISM: ICD-10-CM

## 2018-03-23 DIAGNOSIS — E78.89 LIPIDS ABNORMAL: ICD-10-CM

## 2018-03-23 DIAGNOSIS — C61 PROSTATE CANCER (H): ICD-10-CM

## 2018-03-23 DIAGNOSIS — Z23 NEED FOR PROPHYLACTIC VACCINATION AGAINST STREPTOCOCCUS PNEUMONIAE (PNEUMOCOCCUS): ICD-10-CM

## 2018-03-23 DIAGNOSIS — L89.101 DECUBITUS ULCER OF BACK, STAGE 1: ICD-10-CM

## 2018-03-23 DIAGNOSIS — M51.35 DDD (DEGENERATIVE DISC DISEASE), THORACOLUMBAR: Primary | ICD-10-CM

## 2018-03-23 DIAGNOSIS — Z91.81 AT RISK FOR FALLING: ICD-10-CM

## 2018-03-23 DIAGNOSIS — Z94.4 LIVER REPLACED BY TRANSPLANT (H): ICD-10-CM

## 2018-03-23 DIAGNOSIS — M47.816 SPONDYLOSIS OF LUMBAR REGION WITHOUT MYELOPATHY OR RADICULOPATHY: ICD-10-CM

## 2018-03-23 PROCEDURE — 99204 OFFICE O/P NEW MOD 45 MIN: CPT | Performed by: INTERNAL MEDICINE

## 2018-03-23 RX ORDER — PREDNISONE 5 MG/1
5 TABLET ORAL DAILY
Qty: 90 TABLET | Refills: 3 | Status: SHIPPED | OUTPATIENT
Start: 2018-03-23 | End: 2018-04-27

## 2018-03-23 RX ORDER — METOPROLOL TARTRATE 100 MG
100 TABLET ORAL 2 TIMES DAILY
Qty: 180 TABLET | Refills: 3 | Status: SHIPPED | OUTPATIENT
Start: 2018-03-23 | End: 2018-01-01

## 2018-03-23 RX ORDER — SIMVASTATIN 20 MG
20 TABLET ORAL AT BEDTIME
Qty: 90 TABLET | Refills: 0 | Status: SHIPPED | OUTPATIENT
Start: 2018-03-23 | End: 2018-06-07

## 2018-03-23 NOTE — PATIENT INSTRUCTIONS
[facility]    If you have any questions regarding to your visit please contact your care team:     Team Pink:   Clinic Hours Telephone Number   Internal Medicine:  Dr. Diana Nguyen NP       7am-7pm  Monday - Thursday   7am-5pm  Fridays  [phone]  (Appointment scheduling available 24/7)    Questions about your visit?  Team Line  [phone]   Urgent Care - [place] [facility] - 11am-9pm Monday-Friday Saturday-Sunday- 9am-5pm   [place] - 5pm-9pm Monday-Friday Saturday-Sunday- 9am-5pm  [phone] - [place]   [phone] - [place]       What options do I have for visits at the clinic other than the traditional office visit?  To expand how we care for you, many of our providers are utilizing electronic visits (e-visits) and telephone visits, when medically appropriate, for interactions with their patients rather than a visit in the clinic.   We also offer nurse visits for many medical concerns. Just like any other service, we will bill your insurance company for this type of visit based on time spent on the phone with your provider. Not all insurance companies cover these visits. Please check with your medical insurance if this type of visit is covered. You will be responsible for any charges that are not paid by your insurance.      E-visits via [employer]:  generally incur a $35.00 fee.  Telephone visits:  Time spent on the phone: *charged based on time that is spent on the phone in increments of 10 minutes. Estimated cost:   5-10 mins $30.00   11-20 mins. $59.00   21-30 mins. $85.00   Use [employer] (secure email communication and access to your chart) to send your primary care provider a message or make an appointment. Ask someone on your Team how to sign up for [employer].    For a Price Quote for your services, please call our Consumer Price Line at [phone].    As always, Thank you for trusting us with your health care  needs!    Kalpana SHIELDS CMA ([facility])

## 2018-03-23 NOTE — PROGRESS NOTES
SUBJECTIVE:   Aman Baker is a 83 year old male who presents to clinic today for the following health issues:    Patient care-- Patient has been having his atrial fibrillation for a long time and he is currently taking Apixaban (Eliquis) not coumadin. He follows up with a cardiologist. He also has chronic back pain and he has undergone a vertebroplasty due to vertebrae fractures felt to be secondary to osteoporosis. He was also diagnosed with osteoporosis but he did not get treated with Forteo (Teriparatide (rDNA origin) Injection) due to his liver transplant. Patient states that his wife fell and he might have fractured his vertebrae when he bent down to pick her up.  He states that his leg strength is okay. 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 He also has some moderate chronic kidney disease. Patient had a liver transplant overdue to follow up with Dr. Matt with [facility] organ transplant specialist MD. He has a history of prostate cancer and it was treated with radiation shots. He has a diabetes diagnosis which is diet controlled and he does not take any medications. He keeps an eye on his diabetes to make sure that it is not getting out of control. He has a low thyroid and he takes levothyroxine. It's not entirely clear to me why patient seeks new primary care all the way over to [place] whereas he's always been with [facility] before. He seeks new primary care MD , here with daughter     Immunization-- He has had his tetanus booster and his pneumonia shots. He is due for the new Shingrix vaccine.     Derm-- Patient has erythematous sores in his back which he has had for about a couple months.     Blood pressure--  BP Readings from Last 6 Encounters:   03/23/18 98/68   01/27/17 102/58   08/19/16 94/60   12/14/15 111/66   04/20/15 115/79   12/08/14 100/70       Problem list and histories reviewed & adjusted, as indicated.  Additional history: as  documented    Patient Active Problem List   Diagnosis     Liver replaced by transplant (H)     Chronic atrial fibrillation (H)     Primary osteoarthritis of both knees     MGUS (monoclonal gammopathy of unknown significance)     Spondylosis of lumbar region without myelopathy or radiculopathy     Alpha-1-antitrypsin deficiency (H)     Prostate cancer (H)     Type 2 diabetes mellitus without complication, without long-term current use of insulin (H)     Age-related osteoporosis with current pathological fracture, sequela     Anemia, unspecified type     Acquired hypothyroidism     Decubitus ulcer of back, stage 1     Past Surgical History:   Procedure Laterality Date     TRANSPLANT  2004    liver transplant       Social History   Substance Use Topics     Smoking status: Never Smoker     Smokeless tobacco: Never Used     Alcohol use No     History reviewed. No pertinent family history.      Current Outpatient Prescriptions   Medication Sig Dispense Refill     metoprolol tartrate (LOPRESSOR) 100 MG tablet Take 1 tablet (100 mg) by mouth 2 times daily 180 tablet 3     predniSONE (DELTASONE) 5 MG tablet Take 1 tablet (5 mg) by mouth daily 90 tablet 3     simvastatin (ZOCOR) 20 MG tablet Take 1 tablet (20 mg) by mouth At Bedtime 90 tablet 0     ACE/ARB/ARNI NOT PRESCRIBED, INTENTIONAL, Please choose reason not prescribed, below       CELLCEPT (BRAND) 250 MG CAPSULE Take 3 capsules (750 mg) by mouth 2 times daily 540 capsule 3     predniSONE (DELTASONE) 5 MG tablet TAKE ONE TABLET BY MOUTH EVERY DAY 90 tablet 3     UNABLE TO FIND MEDICATION NAME: instaflex       warfarin (COUMADIN) 1 MG tablet Take 1 tablet by mouth daily 2.5 mg M-T. Fri 1.25mg. Sat, Sun 2.5mg.        Gabapentin, PHN, 300 MG TABS Take 1 tablet twice daily       calcitRIOL (ROCALTROL) 0.25 MCG capsule Take 1 capsule by mouth daily.       levothyroxine (SYNTHROID, LEVOTHROID) 50 MCG tablet Take 1 tablet by mouth daily.       warfarin (COUMADIN) 2.5 MG tablet  "Take 1 tablet daily as directed by the coumadin clinic       Multiple Vitamin (DAILY MULTIVITAMIN PO) Take 1 tablet daily       Calcium Carbonate-Vitamin D (CALCIUM + D PO) Take 1 capsule daily       Magnesium 250 MG tablet Take 1 tablet by mouth daily.       Cholecalciferol (VITAMIN D) 1000 UNIT capsule Take 1 capsule by mouth 2 times daily.       Labs reviewed in EPIC    Reviewed and updated as needed this visit by clinical staff  Tobacco  Allergies  Meds  Med Hx  Surg Hx  Fam Hx  Soc Hx      Reviewed and updated as needed this visit by Provider         ROS:  Constitutional, HEENT, cardiovascular, pulmonary, GI, , musculoskeletal, neuro, skin, endocrine and psych systems are negative, except as otherwise noted.    This document serves as a record of the services and decisions personally performed and made by Jluis Marroquin MD. It was created on their behalf by Aaron Fulton, a trained medical scribe. The creation of this document is based the provider's statements to the medical scribe.  Aaron Fulton  March 23, 2018 1:38 PM   OBJECTIVE:     BP 98/68  Pulse 83  Resp 16  Ht 1.6 m (5' 3\")  Wt 57.2 kg (126 lb)  SpO2 98%  BMI 22.32 kg/m2  Body mass index is 22.32 kg/(m^2).  GENERAL: healthy, alert and no distress, somewhat frail but elderly gentleman appears his stated age   EYES: Eyes grossly normal to inspection, EOMI, conjunctivae and sclerae normal, uses a hearing aid  HENT: ear canals and TM's normal, nose and mouth without ulcers or lesions  NECK: no adenopathy, no asymmetry, masses, or scars and thyroid normal to palpation  RESP: lungs clear to auscultation - no rales, rhonchi or wheezes  CV: irregularly irregular rhythm consistent with atrial fibrillation , normal S1 S2, no S3 or S4, no murmur, click or rub, no peripheral edema and peripheral pulses strong  ABDOMEN: soft, nontender, no hepatosplenomegaly, no masses and bowel sounds normal  MS: no gross musculoskeletal defects noted, no " edema  SKIN: Patient has 3 pressure ulcers at the base of his lower thoracic and upper lumbar spine. 2 of the ulcers are stage 1 and the third ulcer is stage 2. Due to patient being underweight, the bony prominence of the posterior aspect of his spine is visible.   NEURO: Mentation intact and speech normal  PSYCH: mentation appears normal, affect normal/bright    Diagnostic Test Results:  Results for orders placed or performed in visit on 02/07/18   Basic metabolic panel   Result Value Ref Range    Sodium (External) 139 135 - 145 mmol/L    Potassium (External) 4.2 3.5 - 5.0 mmol/L    Chloride (External) 101 98 - 110 mmol/L    CO2 (External) 31 21 - 31 mmol/L    Anion Gap (External) 7 5 - 18    Glucose (External) 108 (H) 65 - 100 mg/dL    Calcium (External) 9.7 8.5 - 10.5 mg/dL    Urea Nitrogen (External) 27 (H) 8 - 25 mg/dL    Creatinine (External) 1.24 0.72 - 1.25 mg/dL    BUN/Creatinine Ratio (External) 22 (H) 10 - 20    GFR Est if Black (External) >60 >60 ml/min/1.73m2    GFR Estimated (External) 56 (L) >60 ml/min/1.73m2    Narrative    Verified by Christopher Byrd on 02/07/2018.   CBC with platelets differential   Result Value Ref Range    WBC Count (External) 4.2 (L) 4.5 - 11.0 thou/cu mm    RBC Count (External) 3.78 (L) 4.30 - 5.90 mil/cu mm    Hemoglobin (External) 11.2 (L) 13.5 - 17.5 g/dL    Hematocrit (External) 35.7 (L) 37.0 - 53.0 %    MCV (External) 94 80 - 100 fL    MCH (External) 29.6 26.0 - 34.0 pg    MCHC (External) 31.4 (L) 32.0 - 36.0    RDW (External) 13.3 11.5 - 15.5 %    Platelet Count (External) 235 140 - 440 thou/cu mm    % Neutrophils (External) 85.0 (H) 42.0 - 72.0 %    % Lymphocytes (External) 5.7 (L) 20.0 - 44.0 %    % Monocytes (External) 8.6 <12.0 %    % Eosinophils (External) 0.5 <80 %    % Basophils (External) 0.2 <3.0 %    Absolute Neutrophils (External) 3.6 1.7 - 7.0 thou/cu mm    Absolute Lymphocytes (External) 0.2 (L) 0.9 - 2.9 thou/cu mm    Absolute Monocytes (External) 0.4 <9  thou/cu mm    Absolute Eosinophils (External) 0.0 <0.5 thou/cu mm    Absolute Basophils (External) 0.0 <0.3 thou/cu mm    Narrative    Verified by Christopher Byrd on 02/07/2018.   Phosphorus   Result Value Ref Range    Phosphorus (External) 3.0 2.3 - 4.7 mg/dL    Narrative    Verified by Christopher Byrd on 02/07/2018.   TXP External Lab Result   Result Value Ref Range    PSA (External) 0.19 <4.00 ng/mL    Protein Random Urine (External) 41 (H) 1 - 14 mg/dL    Scan TXP Lab (External) See Image     Narrative    Verified by Christopher Byrd on 02/07/2018.   IgA   Result Value Ref Range    IgA (External) 315.00 61.00 - 356 mg/dL    Narrative    Verified by Christopher Byrd on 02/07/2018.   IgM   Result Value Ref Range    IgM (External) 63.10 37.00 - 286.00    Narrative    Verified by Christopher Byrd on 02/07/2018.   IgG   Result Value Ref Range    IgG (External) 1050.00 767.00 - 1590.00    Narrative    Verified by Christopher Byrd on 02/07/2018.   INR   Result Value Ref Range    INR (External) 1.1 <1.3    PT - Prothrombine Time (External) 13.6 11.7 - 14.1 sec    Narrative    Verified by Christopher Byrd on 02/07/2018.   Protein total   Result Value Ref Range    Protein Total (External) 6.8 6.0 - 8.0    Narrative    Verified by Christopher Byrd on 02/07/2018.   Reticulocyte count   Result Value Ref Range    Retic % (External) 0.5 0.5 - 2.0 %    Retic abs (External) 0.02 (L) 0.03 - 0.08 mil/cu mm    Narrative    Verified by Christopher Byrd on 02/07/2018.   Vitamin D Deficiency   Result Value Ref Range    Vitamin D Deficiency Screening (External) 34.4 30.0 - 80.0 mg/dL    Narrative    Verified by Christopher Byrd on 02/07/2018.       ASSESSMENT/PLAN:   (M51.35) DDD (degenerative disc disease), thoracolumbar  (primary encounter diagnosis)  Comment: diagnosis explained   Plan: ongoing management     (I48.2) Chronic atrial fibrillation (H)  Comment: beta blocker is for rate control as far as I can tell  Plan: metoprolol  tartrate (LOPRESSOR) 100 MG tablet            (Z91.81) At risk for falling  Comment:   Plan:     (Z23) Need for prophylactic vaccination against Streptococcus pneumoniae (pneumococcus)  Comment:   Immunization History   Administered Date(s) Administered     Influenza (High Dose) 3 valent vaccine 08/20/2017     Influenza (IIV3) PF 10/15/2012, 10/09/2013     Pneumo Conj 13-V (2010&after) 02/09/2015     Pneumococcal 23 valent 01/01/2010     TD (ADULT, 7+) 01/11/2010     Plan:     (Z23) Need for prophylactic vaccination with tetanus-diphtheria (TD)  Comment:   Plan:     (Z94.4) Liver replaced by transplant (H)  Comment: refills provided, needs follow up with organ transplant specialist MD   Plan: predniSONE (DELTASONE) 5 MG tablet, simvastatin        (ZOCOR) 20 MG tablet            (E78.89) Lipids abnormal  Comment: continue current plan of care     Plan: simvastatin (ZOCOR) 20 MG tablet            (D47.2) MGUS (monoclonal gammopathy of unknown significance)  Comment: noted as a point of historical importance   Plan: will possibly need serum protein electrophoresis however I found patient has been seen by Dr. Radha Villa with [facility]. We lack these medical records and will have patient sign a release of information for them.     (M4.376) Spondylosis of lumbar region without myelopathy or radiculopathy  Comment: noted as a point of historical importance   Plan: as above     (E88.01) Alpha-1-antitrypsin deficiency (H)  Comment: noted as a point of historical importance , the reason for his cirrhosis   Plan: as above     (C61) Prostate cancer (H)  Comment: as above   Plan: as above     (E11.9) Type 2 diabetes mellitus without complication, without long-term current use of insulin (H)  Comment: he has a trivial diet controlled diabetes mellitus status nominal and borderline diagnosis   Plan: ACE/ARB/ARNI NOT PRESCRIBED, INTENTIONAL,            (M80.00XS) Age-related osteoporosis with current pathological  fracture, sequela  Comment: as detailed above   Plan: discussed with organ transplant specialist MD     (D64.9) Anemia, unspecified type  Comment: see laboratory studies from [facility]   Plan: we will see patient biannual office visits     (E03.9) Acquired hypothyroidism  Comment: as above   Plan: as above     (L89.101) Decubitus ulcer of back, stage 1  Comment: these are pressure sores   Plan: basic reviewed with patient . Plan of care detailed . Avoid pressure. See informational material printed and given to pt     Patient Instructions     [facility]    If you have any questions regarding to your visit please contact your care team:     Team Pink:   Clinic Hours Telephone Number   Internal Medicine:  Dr. Diana Nguyen, NP       7am-7pm  Monday - Thursday   7am-5pm  Fridays  [phone]  (Appointment scheduling available 24/7)    Questions about your visit?  Team Line  [phone]   Urgent Care - [facility] [place] and [place] - 11am-9pm Monday-Friday Saturday-Sunday- 9am-5pm   [place] - 5pm-9pm Monday-Friday Saturday-Sunday- 9am-5pm  [phone] - Catherine   [phone] - [place]       What options do I have for visits at the clinic other than the traditional office visit?  To expand how we care for you, many of our providers are utilizing electronic visits (e-visits) and telephone visits, when medically appropriate, for interactions with their patients rather than a visit in the clinic.   We also offer nurse visits for many medical concerns. Just like any other service, we will bill your insurance company for this type of visit based on time spent on the phone with your provider. Not all insurance companies cover these visits. Please check with your medical insurance if this type of visit is covered. You will be responsible for any charges that are not paid by your insurance.      E-visits via [employer]:  generally incur a $35.00  fee.  Telephone visits:  Time spent on the phone: *charged based on time that is spent on the phone in increments of 10 minutes. Estimated cost:   5-10 mins $30.00   11-20 mins. $59.00   21-30 mins. $85.00   Use [employer] (secure email communication and access to your chart) to send your primary care provider a message or make an appointment. Ask someone on your Team how to sign up for [employer].    For a Price Quote for your services, please call our [employer] Line at [phone].    As always, Thank you for trusting us with your health care needs!    Kalpana SHIELDS CMA ([facility])        The information in this document, created by the medical scribe for me, accurately reflects the services I personally performed and the decisions made by me. I have reviewed and approved this document for accuracy.   MD Jluis Atkinson MD  [facility]

## 2018-03-23 NOTE — MR AVS SNAPSHOT
After Visit Summary   3/23/2018    Aman Baker    MRN: [id number]           Patient Information     Date Of Birth          5/6/1934        Visit Information        Provider Department      3/23/2018 1:30 PM Jluis Marroquin MD [facility]        Today's Diagnoses     DDD (degenerative disc disease), thoracolumbar    -  1    Chronic atrial fibrillation (H)        At risk for falling        Need for prophylactic vaccination against Streptococcus pneumoniae (pneumococcus)        Need for prophylactic vaccination with tetanus-diphtheria (TD)        Liver replaced by transplant (H)        Lipids abnormal        MGUS (monoclonal gammopathy of unknown significance)        Spondylosis of lumbar region without myelopathy or radiculopathy        Alpha-1-antitrypsin deficiency (H)        Prostate cancer (H)        Type 2 diabetes mellitus without complication, without long-term current use of insulin (H)        Age-related osteoporosis with current pathological fracture, sequela        Anemia, unspecified type        Acquired hypothyroidism        Decubitus ulcer of back, stage 1          Care Instructions    [facility]    If you have any questions regarding to your visit please contact your care team:     Team Pink:   Clinic Hours Telephone Number   Internal Medicine:  Dr. Diana Nguyen NP       7am-7pm  Monday - Thursday   7am-5pm  Fridays  [phone]  (Appointment scheduling available 24/7)    Questions about your visit?  Team Line  [phone]   Urgent Care - [place] - 11am-9pm Monday-Friday Saturday-Sunday- 9am-5pm   [place] - 5pm-9pm Monday-Friday Saturday-Sunday- 9am-5pm  [phone] - [place]   [phone] - [place]       What options do I have for visits at the clinic other than the traditional office visit?  To expand how we care for you, many of our providers are utilizing electronic visits  (e-visits) and telephone visits, when medically appropriate, for interactions with their patients rather than a visit in the clinic.   We also offer nurse visits for many medical concerns. Just like any other service, we will bill your insurance company for this type of visit based on time spent on the phone with your provider. Not all insurance companies cover these visits. Please check with your medical insurance if this type of visit is covered. You will be responsible for any charges that are not paid by your insurance.      E-visits via [employer]:  generally incur a $35.00 fee.  Telephone visits:  Time spent on the phone: *charged based on time that is spent on the phone in increments of 10 minutes. Estimated cost:   5-10 mins $30.00   11-20 mins. $59.00   21-30 mins. $85.00   Use [employer] (secure email communication and access to your chart) to send your primary care provider a message or make an appointment. Ask someone on your Team how to sign up for [employer].    For a Price Quote for your services, please call our [place] [employer] [place] Line at [phone].    As always, Thank you for trusting us with your health care needs!    Kalpana SHIELDS CMA ([facility])            Follow-ups after your visit        Who to contact     If you have questions or need follow up information about today's clinic visit or your schedule please contact [facility] directly at [phone].  Normal or non-critical lab and imaging results will be communicated to you by [employer], letter or phone within 4 business days after the clinic has received the results. If you do not hear from us within 7 days, please contact the clinic through [employer] or phone. If you have a critical or abnormal lab result, we will notify you by phone as soon as possible.  Submit refill requests through [employer] or call your pharmacy and they will forward the refill request to us. Please allow 3 business days for your refill to be completed.          Additional  "Information About Your Visit        MyChart Information     [employer] lets you send messages to your doctor, view your test results, renew your prescriptions, schedule appointments and more. To sign up, go to [email or web address] . Click on \"Log in\" on the left side of the screen, which will take you to the Welcome page. Then click on \"Sign up Now\" on the right side of the page.     You will be asked to enter the access code listed below, as well as some personal information. Please follow the directions to create your username and password.     Your access code is: [email or web address]  Expires: 2018  2:13 PM     Your access code will  in 90 days. If you need help or a new code, please call your [place] clinic or [phone].        Care EveryWhere ID     This is your Care EveryWhere ID. This could be used by other organizations to access your [place] medical records  [id number]        Your Vitals Were     Pulse Respirations Height Pulse Oximetry BMI (Body Mass Index)       83 16 5' 3\" (1.6 m) 98% 22.32 kg/m2        Blood Pressure from Last 3 Encounters:   18 98/68   17 102/58   16 94/60    Weight from Last 3 Encounters:   18 126 lb (57.2 kg)   17 149 lb (67.6 kg)   16 148 lb 3.2 oz (67.2 kg)              Today, you had the following     No orders found for display         Today's Medication Changes          These changes are accurate as of 3/23/18  2:13 PM.  If you have any questions, ask your nurse or doctor.               Start taking these medicines.        Dose/Directions    ACE/ARB/ARNI NOT PRESCRIBED (INTENTIONAL)   Used for:  Type 2 diabetes mellitus without complication, without long-term current use of insulin (H)   Started by:  Jluis Marroquin MD        Please choose reason not prescribed, below   Refills:  0         These medicines have changed or have updated prescriptions.        Dose/Directions    * predniSONE 5 MG tablet   Commonly known as:  DELTASONE "   This may have changed:  Another medication with the same name was changed. Make sure you understand how and when to take each.   Used for:  Liver replaced by transplant (H)   Changed by:  Jluis Marroquin MD        TAKE ONE TABLET BY MOUTH EVERY DAY   Quantity:  90 tablet   Refills:  3       * predniSONE 5 MG tablet   Commonly known as:  DELTASONE   This may have changed:  See the new instructions.   Used for:  Liver replaced by transplant (H)   Changed by:  Jluis Marroquin MD        Dose:  5 mg   Take 1 tablet (5 mg) by mouth daily   Quantity:  90 tablet   Refills:  3       * Notice:  This list has 2 medication(s) that are the same as other medications prescribed for you. Read the directions carefully, and ask your doctor or other care provider to review them with you.      Stop taking these medicines if you haven't already. Please contact your care team if you have questions.     furosemide 20 MG tablet   Commonly known as:  LASIX   Stopped by:  Jluis Marroquin MD                Where to get your medicines      These medications were sent to [place] [id number] [place] [facility] [place] - [address]  [address] [place] [address]     Phone:  [phone]     metoprolol tartrate 100 MG tablet    predniSONE 5 MG tablet    simvastatin 20 MG tablet         Some of these will need a paper prescription and others can be bought over the counter.  Ask your nurse if you have questions.     You don't need a prescription for these medications     ACE/ARB/ARNI NOT PRESCRIBED (INTENTIONAL)                Primary Care Provider Office Phone # Fax #    Slade Yaritza-Javier [phone]       [facility] [address] [place] [address]        Equal Access to Services     [facility] AH: Hadii aad ku hadasho Sonahid, waaxda luqadaha, qaybta kaalmada manny, charlette desai. So [facility] [phone].    ATENCIÓN: Si habla español, tiene a flynn disposición servicios gratuitos de asistencia  lingüística. Eddi al [phone].    We comply with applicable federal civil rights laws and [place] laws. We do not discriminate on the basis of race, color, national origin, age, disability, sex, sexual orientation, or gender identity.            Thank you!     Thank you for choosing [facility]  for your care. Our goal is always to provide you with excellent care. Hearing back from our patients is one way we can continue to improve our services. Please take a few minutes to complete the written survey that you may receive in the mail after your visit with us. Thank you!             Your Updated Medication List - Protect others around you: Learn how to safely use, store and throw away your medicines at www.disposemymeds.org.          This list is accurate as of 3/23/18  2:13 PM.  Always use your most recent med list.                   Brand Name Dispense Instructions for use Diagnosis    ACE/ARB/ARNI NOT PRESCRIBED (INTENTIONAL)      Please choose reason not prescribed, below    Type 2 diabetes mellitus without complication, without long-term current use of insulin (H)       calcitRIOL 0.25 MCG capsule    ROCALTROL     Take 1 capsule by mouth daily.        CALCIUM + D PO      Take 1 capsule daily        DAILY MULTIVITAMIN PO      Take 1 tablet daily        gabapentin 300 MG 24 hr tablet    GRALISE     Take 1 tablet twice daily        levothyroxine 50 MCG tablet    SYNTHROID/LEVOTHROID     Take 1 tablet by mouth daily.        magnesium 250 MG tablet      Take 1 tablet by mouth daily.        metoprolol tartrate 100 MG tablet    LOPRESSOR    180 tablet    Take 1 tablet (100 mg) by mouth 2 times daily    Chronic atrial fibrillation (H)       mycophenolate 250 MG capsule     540 capsule    Take 3 capsules (750 mg) by mouth 2 times daily    Liver replaced by transplant (H)       * predniSONE 5 MG tablet    DELTASONE    90 tablet    TAKE ONE TABLET BY MOUTH EVERY DAY    Liver replaced by transplant (H)        * predniSONE 5 MG tablet    DELTASONE    90 tablet    Take 1 tablet (5 mg) by mouth daily    Liver replaced by transplant (H)       simvastatin 20 MG tablet    ZOCOR    90 tablet    Take 1 tablet (20 mg) by mouth At Bedtime    Liver replaced by transplant (H), Lipids abnormal       UNABLE TO FIND      MEDICATION NAME: instaflex        vitamin D 1000 UNITS capsule      Take 1 capsule by mouth 2 times daily.        * warfarin 2.5 MG tablet    COUMADIN     Take 1 tablet daily as directed by the coumadin clinic        * COUMADIN 1 MG tablet   Generic drug:  warfarin      Take 1 tablet by mouth daily 2.5 mg M-T. Fri 1.25mg. Sat, Sun 2.5mg.        * Notice:  This list has 4 medication(s) that are the same as other medications prescribed for you. Read the directions carefully, and ask your doctor or other care provider to review them with you.

## 2018-04-04 DIAGNOSIS — Z94.4 LIVER REPLACED BY TRANSPLANT (H): Primary | ICD-10-CM

## 2018-04-10 DIAGNOSIS — Z94.4 LIVER REPLACED BY TRANSPLANT (H): ICD-10-CM

## 2018-04-10 LAB
ALBUMIN SERPL-MCNC: 4 G/DL (ref 3.4–5)
ALP SERPL-CCNC: 73 U/L (ref 40–150)
ALT SERPL W P-5'-P-CCNC: 7 U/L (ref 0–70)
ANION GAP SERPL CALCULATED.3IONS-SCNC: 7 MMOL/L (ref 3–14)
AST SERPL W P-5'-P-CCNC: 12 U/L (ref 0–45)
BILIRUB DIRECT SERPL-MCNC: 0.1 MG/DL (ref 0–0.2)
BILIRUB SERPL-MCNC: 0.5 MG/DL (ref 0.2–1.3)
BUN SERPL-MCNC: 25 MG/DL (ref 7–30)
CALCIUM SERPL-MCNC: 8.8 MG/DL (ref 8.5–10.1)
CHLORIDE SERPL-SCNC: 103 MMOL/L (ref 94–109)
CO2 SERPL-SCNC: 31 MMOL/L (ref 20–32)
CREAT SERPL-MCNC: 1.05 MG/DL (ref 0.66–1.25)
ERYTHROCYTE [DISTWIDTH] IN BLOOD BY AUTOMATED COUNT: 14.3 % (ref 10–15)
GFR SERPL CREATININE-BSD FRML MDRD: 67 ML/MIN/1.7M2
GLUCOSE SERPL-MCNC: 95 MG/DL (ref 70–99)
HCT VFR BLD AUTO: 36.5 % (ref 40–53)
HGB BLD-MCNC: 11.1 G/DL (ref 13.3–17.7)
MAGNESIUM SERPL-MCNC: 1.8 MG/DL (ref 1.6–2.3)
MCH RBC QN AUTO: 29.3 PG (ref 26.5–33)
MCHC RBC AUTO-ENTMCNC: 30.4 G/DL (ref 31.5–36.5)
MCV RBC AUTO: 96 FL (ref 78–100)
PLATELET # BLD AUTO: 212 10E9/L (ref 150–450)
POTASSIUM SERPL-SCNC: 3.6 MMOL/L (ref 3.4–5.3)
PROT SERPL-MCNC: 7.1 G/DL (ref 6.8–8.8)
RBC # BLD AUTO: 3.79 10E12/L (ref 4.4–5.9)
SODIUM SERPL-SCNC: 141 MMOL/L (ref 133–144)
WBC # BLD AUTO: 4.9 10E9/L (ref 4–11)

## 2018-04-10 PROCEDURE — 36415 COLL VENOUS BLD VENIPUNCTURE: CPT | Performed by: INTERNAL MEDICINE

## 2018-04-10 PROCEDURE — 80076 HEPATIC FUNCTION PANEL: CPT | Performed by: INTERNAL MEDICINE

## 2018-04-10 PROCEDURE — 85027 COMPLETE CBC AUTOMATED: CPT | Performed by: INTERNAL MEDICINE

## 2018-04-10 PROCEDURE — 80048 BASIC METABOLIC PNL TOTAL CA: CPT | Performed by: INTERNAL MEDICINE

## 2018-04-10 PROCEDURE — 83735 ASSAY OF MAGNESIUM: CPT | Performed by: INTERNAL MEDICINE

## 2018-04-25 NOTE — TELEPHONE ENCOUNTER
D3-50 33278 units capsule        Last Written Prescription Date:  NA  Last Fill Quantity: NA,   # refills: NA  Last Office Visit: 3/23/2018  Future Office visit:       Routing refill request to provider for review/approval because:  Drug not active on patient's medication list

## 2018-04-26 NOTE — TELEPHONE ENCOUNTER
Treatment of Hypovitaminosis D with ergocalciferol, 50,000 international unit vitamin D dose is something that's no longer done. This was a popular treatment for a time but subsequent studies have shown this manner of treating low vitamin D levels is ineffective .    In point of fact, we checked this patients Vitamin D levels and they were normal in January 2018.    I would only support taking cholecalciferol at 2-3 thousand international units a day. If desired we can send in a prescription.     Please let me know if patient has questions for me and / or send in the prescription     Jluis Marroquin MD

## 2018-04-27 ENCOUNTER — OFFICE VISIT (OUTPATIENT)
Dept: GASTROENTEROLOGY | Facility: CLINIC | Age: 83
End: 2018-04-27
Attending: INTERNAL MEDICINE
Payer: MEDICARE

## 2018-04-27 VITALS
TEMPERATURE: 98 F | HEIGHT: 62 IN | WEIGHT: 126 LBS | HEART RATE: 69 BPM | SYSTOLIC BLOOD PRESSURE: 100 MMHG | BODY MASS INDEX: 23.19 KG/M2 | DIASTOLIC BLOOD PRESSURE: 63 MMHG

## 2018-04-27 DIAGNOSIS — Z94.4 LIVER REPLACED BY TRANSPLANT (H): Primary | ICD-10-CM

## 2018-04-27 PROCEDURE — G0463 HOSPITAL OUTPT CLINIC VISIT: HCPCS | Mod: ZF

## 2018-04-27 ASSESSMENT — PAIN SCALES - GENERAL: PAINLEVEL: MODERATE PAIN (4)

## 2018-04-27 NOTE — PROGRESS NOTES
HISTORY OF PRESENT ILLNESS:  I had the pleasure of seeing Aman Baker for followup in the Liver Transplant Clinic at the [facility] [place] on 04/27/2018.  Mr. Baker returns for followup now 13-1/2 years status post liver transplantation.      Since I saw him last, which I did not realize had been a year and a half ago, he has had some changes in his life.  His wife is now in a nursing home because of dementia and he is living at home by himself.  His family is very involved with him, and he unfortunately did lose a great deal of weight when his wife first went into the nursing home.  His daughter-in-law came with him today and says that things have stabilized.      He denies any abdominal pain, itching or skin rash or fatigue.  He denies any cough or shortness of breath.  He denies any nausea, vomiting, diarrhea or constipation.  He does report his appetite is good.      He also was diagnosed with compression fractures related to osteoporosis.        He also was diagnosed with a monoclonal gammopathy which seems to be fairly trivial.     Current Outpatient Prescriptions   Medication     ACE/ARB/ARNI NOT PRESCRIBED, INTENTIONAL,     apixaban ANTICOAGULANT (ELIQUIS) 2.5 MG tablet     calcitRIOL (ROCALTROL) 0.25 MCG capsule     Calcium Carbonate-Vitamin D (CALCIUM + D PO)     CELLCEPT (BRAND) 250 MG CAPSULE     Cholecalciferol (VITAMIN D) 1000 UNIT capsule     levothyroxine (SYNTHROID, LEVOTHROID) 50 MCG tablet     Magnesium 250 MG tablet     metoprolol tartrate (LOPRESSOR) 100 MG tablet     Multiple Vitamin (DAILY MULTIVITAMIN PO)     predniSONE (DELTASONE) 5 MG tablet     simvastatin (ZOCOR) 20 MG tablet     Gabapentin, PHN, 300 MG TABS     [DISCONTINUED] predniSONE (DELTASONE) 5 MG tablet     No current facility-administered medications for this visit.      B/P: 100/63, T: 98, P: 69, R: Data Unavailable    PHYSICAL EXAMINATION:  HEENT exam shows no scleral icterus or temple muscle wasting.   His chest is clear.  His abdominal exam shows no increase in girth.  No masses or tenderness to palpation are present.  His liver is 10 cm in span without left lobe enlargement.  No spleen tip is palpable, and extremity exam shows no edema.  Skin exam shows no suspicious lesions.  Neurologic exam is unchanged.     His most recent laboratory tests show his white count is 4.9, hemoglobin 11.1, platelets are 212,000.  Sodium is 141, potassium 3.6, BUN is 25, creatinine 1.05.  AST is 12, ALT is 7, alkaline phosphatase 73.  Albumin is 4.0, total protein is 7.1, total bilirubin is 0.5.      My impression is that Mr. Baker is doing well from most medical standpoint 13-1/2 years status post retransplantation.  I feel as though he may have some cognitive decline as well.  He does have fairly severe osteoporosis which seems to be his major limitation.  He did come to the clinic visit in a wheelchair.  He did have some sort surgery to try to do an osteoplasty on his compression fractures that only worked partially.  I, otherwise, will not be making any other change to his medical regimen.  I will see him back in the clinic again in 1 year.      Thank you very much for allowing me to participate in the care of your patient.  If you have any questions regarding my recommendations, please do not hesitate to contact me.        Jose Matt MD      Professor of Medicine  [employer]      Executive Medical Director, Solid Organ Transplant Program  [facility]

## 2018-04-27 NOTE — MR AVS SNAPSHOT
"              After Visit Summary   2018    Aman Baker    MRN: [id number]           Patient Information     Date Of Birth          1934        Visit Information        Provider Department      2018 10:15 AM Jose Matt MD [facility] Hepatology        Today's Diagnoses     Liver replaced by transplant (H)    -  1       Follow-ups after your visit        Follow-up notes from your care team     Return in about 6 months (around 10/27/2018).      Who to contact     If you have questions or need follow up information about today's clinic visit or your schedule please contact [facility] directly at [phone].  Normal or non-critical lab and imaging results will be communicated to you by MyChart, letter or phone within 4 business days after the clinic has received the results. If you do not hear from us within 7 days, please contact the clinic through [employer] or phone. If you have a critical or abnormal lab result, we will notify you by phone as soon as possible.  Submit refill requests through [employer] or call your pharmacy and they will forward the refill request to us. Please allow 3 business days for your refill to be completed.          Additional Information About Your Visit        MyChart Information     [employer] lets you send messages to your doctor, view your test results, renew your prescriptions, schedule appointments and more. To sign up, go to [email or web address] . Click on \"Log in\" on the left side of the screen, which will take you to the Welcome page. Then click on \"Sign up Now\" on the right side of the page.     You will be asked to enter the access code listed below, as well as some personal information. Please follow the directions to create your username and password.     Your access code is: [email or web address]  Expires: 2018  2:13 PM     Your access code will  in 90 days. If you need help or a new code, please call your [place] clinic or [phone].        Care " "EveryWhere ID     This is your Care EveryWhere ID. This could be used by other organizations to access your [place] medical records  [id number]        Your Vitals Were     Pulse Temperature Height BMI (Body Mass Index)          69 98  F (36.7  C) (Oral) 1.575 m (5' 2\") 23.05 kg/m2         Blood Pressure from Last 3 Encounters:   04/27/18 100/63   03/23/18 98/68   01/27/17 102/58    Weight from Last 3 Encounters:   04/27/18 57.2 kg (126 lb)   03/23/18 57.2 kg (126 lb)   01/27/17 67.6 kg (149 lb)              Today, you had the following     No orders found for display         Today's Medication Changes          These changes are accurate as of 4/27/18 11:59 PM.  If you have any questions, ask your nurse or doctor.               These medicines have changed or have updated prescriptions.        Dose/Directions    predniSONE 5 MG tablet   Commonly known as:  DELTASONE   This may have changed:  Another medication with the same name was removed. Continue taking this medication, and follow the directions you see here.   Used for:  Liver replaced by transplant (H)   Changed by:  Jose Matt MD        TAKE ONE TABLET BY MOUTH EVERY DAY   Quantity:  90 tablet   Refills:  3         Stop taking these medicines if you haven't already. Please contact your care team if you have questions.     COUMADIN 1 MG tablet   Generic drug:  warfarin   Stopped by:  Jose Matt MD           UNABLE TO FIND   Stopped by:  Jose Matt MD           warfarin 2.5 MG tablet   Commonly known as:  COUMADIN   Stopped by:  Jose Matt MD                    Primary Care Provider Office Phone # Fax #    Slade Chente [phone]       [facility] [address] [place] [address]        Equal Access to Services     [facility] AH: Hadii lo Pérez, waaxda luqadaha, qaybta kaalmada adeegyada, charlette desai. So [facility] [phone].    ATENCIÓN: Si habla español, tiene a flynn " disposición servicios gratuitos de asistencia lingüística. Eddi dowd [phone].    We comply with applicable federal civil rights laws and [place] laws. We do not discriminate on the basis of race, color, national origin, age, disability, sex, sexual orientation, or gender identity.            Thank you!     Thank you for choosing [facility]  for your care. Our goal is always to provide you with excellent care. Hearing back from our patients is one way we can continue to improve our services. Please take a few minutes to complete the written survey that you may receive in the mail after your visit with us. Thank you!             Your Updated Medication List - Protect others around you: Learn how to safely use, store and throw away your medicines at www.disposemymeds.org.          This list is accurate as of 4/27/18 11:59 PM.  Always use your most recent med list.                   Brand Name Dispense Instructions for use Diagnosis    ACE/ARB/ARNI NOT PRESCRIBED (INTENTIONAL)      Please choose reason not prescribed, below    Type 2 diabetes mellitus without complication, without long-term current use of insulin (H)       apixaban ANTICOAGULANT 2.5 MG tablet    ELIQUIS     Take 2.5 mg by mouth        calcitRIOL 0.25 MCG capsule    ROCALTROL     Take 1 capsule by mouth daily.        CALCIUM + D PO      Take 1 capsule daily        DAILY MULTIVITAMIN PO      Take 1 tablet daily        gabapentin 300 MG 24 hr tablet    GRALISE     Take 1 tablet twice daily        levothyroxine 50 MCG tablet    SYNTHROID/LEVOTHROID     Take 1 tablet by mouth daily.        magnesium 250 MG tablet      Take 1 tablet by mouth daily.        metoprolol tartrate 100 MG tablet    LOPRESSOR    180 tablet    Take 1 tablet (100 mg) by mouth 2 times daily    Chronic atrial fibrillation (H)       mycophenolate 250 MG capsule     540 capsule    Take 3 capsules (750 mg) by mouth 2 times daily    Liver replaced by transplant (H)        predniSONE 5 MG tablet    DELTASONE    90 tablet    TAKE ONE TABLET BY MOUTH EVERY DAY    Liver replaced by transplant (H)       simvastatin 20 MG tablet    ZOCOR    90 tablet    Take 1 tablet (20 mg) by mouth At Bedtime    Liver replaced by transplant (H), Lipids abnormal       vitamin D 1000 units capsule      Take 1 capsule by mouth 2 times daily.

## 2018-04-27 NOTE — NURSING NOTE
"Chief Complaint   Patient presents with     RECHECK     follow up with liver transplant, tzimmer cma       Initial /63  Pulse 69  Temp 98  F (36.7  C) (Oral)  Ht 1.575 m (5' 2\")  Wt 57.2 kg (126 lb)  BMI 23.05 kg/m2 Estimated body mass index is 23.05 kg/(m^2) as calculated from the following:    Height as of this encounter: 1.575 m (5' 2\").    Weight as of this encounter: 57.2 kg (126 lb).  Medication Reconciliation: complete    "

## 2018-04-27 NOTE — LETTER
4/27/2018      RE: Aman Baker  [address] [facility] [address]       HISTORY OF PRESENT ILLNESS:  I had the pleasure of seeing Aman Baker for followup in the Liver Transplant Clinic at the [facility] on 04/27/2018.  Mr. Baker returns for followup now 13-1/2 years status post liver transplantation.      Since I saw him last, which I did not realize had been a year and a half ago, he has had some changes in his life.  His wife is now in a nursing home because of dementia and he is living at home by himself.  His family is very involved with him, and he unfortunately did lose a great deal of weight when his wife first went into the nursing home.  His daughter-in-law came with him today and says that things have stabilized.      He denies any abdominal pain, itching or skin rash or fatigue.  He denies any cough or shortness of breath.  He denies any nausea, vomiting, diarrhea or constipation.  He does report his appetite is good.      He also was diagnosed with compression fractures related to osteoporosis.        He also was diagnosed with a monoclonal gammopathy which seems to be fairly trivial.     Current Outpatient Prescriptions   Medication     ACE/ARB/ARNI NOT PRESCRIBED, INTENTIONAL,     apixaban ANTICOAGULANT (ELIQUIS) 2.5 MG tablet     calcitRIOL (ROCALTROL) 0.25 MCG capsule     Calcium Carbonate-Vitamin D (CALCIUM + D PO)     CELLCEPT (BRAND) 250 MG CAPSULE     Cholecalciferol (VITAMIN D) 1000 UNIT capsule     levothyroxine (SYNTHROID, LEVOTHROID) 50 MCG tablet     Magnesium 250 MG tablet     metoprolol tartrate (LOPRESSOR) 100 MG tablet     Multiple Vitamin (DAILY MULTIVITAMIN PO)     predniSONE (DELTASONE) 5 MG tablet     simvastatin (ZOCOR) 20 MG tablet     Gabapentin, PHN, 300 MG TABS     [DISCONTINUED] predniSONE (DELTASONE) 5 MG tablet     No current facility-administered medications for this visit.      B/P: 100/63, T: 98, P: 69, R: Data  Unavailable    PHYSICAL EXAMINATION:  HEENT exam shows no scleral icterus or temple muscle wasting.  His chest is clear.  His abdominal exam shows no increase in girth.  No masses or tenderness to palpation are present.  His liver is 10 cm in span without left lobe enlargement.  No spleen tip is palpable, and extremity exam shows no edema.  Skin exam shows no suspicious lesions.  Neurologic exam is unchanged.     His most recent laboratory tests show his white count is 4.9, hemoglobin 11.1, platelets are 212,000.  Sodium is 141, potassium 3.6, BUN is 25, creatinine 1.05.  AST is 12, ALT is 7, alkaline phosphatase 73.  Albumin is 4.0, total protein is 7.1, total bilirubin is 0.5.      My impression is that Mr. Baker is doing well from most medical standpoint 13-1/2 years status post retransplantation.  I feel as though he may have some cognitive decline as well.  He does have fairly severe osteoporosis which seems to be his major limitation.  He did come to the clinic visit in a wheelchair.  He did have some sort surgery to try to do an osteoplasty on his compression fractures that only worked partially.  I, otherwise, will not be making any other change to his medical regimen.  I will see him back in the clinic again in 1 year.      Thank you very much for allowing me to participate in the care of your patient.  If you have any questions regarding my recommendations, please do not hesitate to contact me.        Jose Matt MD      Professor of Medicine  [employer]      Executive Medical Director, Solid Organ Transplant Program  [facility]

## 2018-04-30 RX ORDER — METHOCARBAMOL 750 MG/1
TABLET ORAL
Qty: 12 CAPSULE | Refills: 0 | OUTPATIENT
Start: 2018-04-30

## 2018-04-30 NOTE — PROGRESS NOTES
SUBJECTIVE:   Aman Baker is a 83 year old male who presents to clinic today for the following health issues:       Falls frequently  Screening for diabetic peripheral neuropathy  Chronic atrial fibrillation (H)  Liver replaced by transplant (H)  Type 2 diabetes mellitus without complication, without long-term current use of insulin (H)  Acquired hypothyroidism  CARDIOVASCULAR SCREENING; LDL GOAL LESS THAN 100  Physical deconditioning     A new patient to me, I did meet him once before. He'd switched to [facility] from [facility] for uncertain reasons. Here today with daughter.    Falls of the elderly ; 2 times last week, otherwise last fall was during the winter during snowfall. So why did he fall 2 times a time . No loss of consciousness. Otherwise has been ok. His fall is ultimately not explained easily. Denies recent symptoms of illness, nothing more then his chronic history which is significant.    Wt Readings from Last 5 Encounters:   05/01/18 123 lb 8 oz (56 kg)   04/27/18 126 lb (57.2 kg)   03/23/18 126 lb (57.2 kg)   01/27/17 149 lb (67.6 kg)   11/01/16 148 lb 3.2 oz (67.2 kg)     He has a wife with dementia, helped her up in October and experienced vertebral fractures. This led to persistent low back pain ever since then and received treatment with vertebroplasty for a vertebral compression fracture  In January 2018. This was of benefit. Reduced symptoms by 50%. Lives with chronic low back pain nevertheless . Good days and bad days . He uses tramadol (Ultram) and Gabapentin [Neurontin] . He feels the Gabapentin [Neurontin] is of zero benefit and has been taking it in a sparing fashion.    Body mass index is 22.59 kg/(m^2).    Apixaban (Eliquis) replaced coumadin and they had questions for me.    Joint Pain    Onset: 2 weeks ago    Description:   Location: left rib and right side back  Character: Dull ache and Stabbing    Intensity: moderate    Progression of Symptoms: worse    Accompanying  Signs & Symptoms:  Other symptoms: none    History:   Previous similar pain: no       Precipitating factors:   Trauma or overuse: YES- fell twice    Alleviating factors:  Improved by: rest/inactivity    Therapies Tried and outcome: none    Iron supplements and vitamin B 12  Complex vitamins    Problem list and histories reviewed & adjusted, as indicated.  Additional history: as documented    Patient Active Problem List   Diagnosis     Liver replaced by transplant (H)     Chronic atrial fibrillation (H)     Primary osteoarthritis of both knees     MGUS (monoclonal gammopathy of unknown significance)     Spondylosis of lumbar region without myelopathy or radiculopathy     Alpha-1-antitrypsin deficiency (H)     Prostate cancer (H)     Type 2 diabetes mellitus without complication, without long-term current use of insulin (H)     Age-related osteoporosis with current pathological fracture, sequela     Anemia, unspecified type     Acquired hypothyroidism     Decubitus ulcer of back, stage 1     Past Surgical History:   Procedure Laterality Date     TRANSPLANT  2004    liver transplant       Social History   Substance Use Topics     Smoking status: Never Smoker     Smokeless tobacco: Never Used     Alcohol use No     History reviewed. No pertinent family history.      Current Outpatient Prescriptions   Medication Sig Dispense Refill     ACE/ARB/ARNI NOT PRESCRIBED, INTENTIONAL, Please choose reason not prescribed, below       apixaban ANTICOAGULANT (ELIQUIS) 2.5 MG tablet Take 2.5 mg by mouth       calcitRIOL (ROCALTROL) 0.25 MCG capsule Take 1 capsule by mouth daily.       Calcium Carbonate-Vitamin D (CALCIUM + D PO) Take 1 capsule daily       CELLCEPT (BRAND) 250 MG CAPSULE Take 3 capsules (750 mg) by mouth 2 times daily 540 capsule 3     Cholecalciferol (VITAMIN D) 1000 UNIT capsule Take 1 capsule by mouth 2 times daily.       Gabapentin, PHN, 300 MG TABS Take 1 tablet twice daily       levothyroxine (SYNTHROID,  "LEVOTHROID) 50 MCG tablet Take 1 tablet by mouth daily.       Magnesium 250 MG tablet Take 1 tablet by mouth daily.       metoprolol tartrate (LOPRESSOR) 100 MG tablet Take 1 tablet (100 mg) by mouth 2 times daily 180 tablet 3     Multiple Vitamin (DAILY MULTIVITAMIN PO) Take 1 tablet daily       predniSONE (DELTASONE) 5 MG tablet TAKE ONE TABLET BY MOUTH EVERY DAY 90 tablet 3     simvastatin (ZOCOR) 20 MG tablet Take 1 tablet (20 mg) by mouth At Bedtime 90 tablet 0     Allergies   Allergen Reactions     Penicillins Hives and Rash     Sulfa Drugs Hives and Rash     Recent Labs   Lab Test  05/01/18   1542  04/10/18   0934  08/19/16   0829  01/17/14   0933   07/16/10   1033   A1C  5.7*   --    --    --    --    --    LDL   --    --    --    --    --   75   HDL   --    --    --    --    --   40   TRIG   --    --    --    --    --   151*   ALT   --   7  30  35   --    --    CR   --   1.05  1.27*  1.39*   < >   --    GFRESTIMATED   --   67  54*  49*   < >   --    GFRESTBLACK   --   81  66  60*   < >   --    POTASSIUM   --   3.6  3.8  4.4   < >   --     < > = values in this interval not displayed.      BP Readings from Last 3 Encounters:   05/01/18 110/80   04/27/18 100/63   03/23/18 98/68    Wt Readings from Last 3 Encounters:   05/01/18 123 lb 8 oz (56 kg)   04/27/18 126 lb (57.2 kg)   03/23/18 126 lb (57.2 kg)                  Labs reviewed in EPIC    Reviewed and updated as needed this visit by clinical staff       Reviewed and updated as needed this visit by Provider         ROS:  Constitutional, HEENT, cardiovascular, pulmonary, gi and gu systems are negative, except as otherwise noted.    OBJECTIVE:                                                    /80  Pulse 81  Temp 97  F (36.1  C) (Oral)  Resp 14  Ht 5' 2\" (1.575 m)  Wt 123 lb 8 oz (56 kg)  SpO2 97%  BMI 22.59 kg/m2  Body mass index is 22.59 kg/(m^2).  GENERAL APPEARANCE: healthy, alert and no distress, appears his stated age, probably just a " little bit, looks older then his stated age   EYES: Eyes grossly normal to inspection, PERRL and conjunctivae and sclerae normal  MS: he's got a significant thoracic kyphosis and some evidence of minor bruising / dermal hemorrhages with forearms. From his fall he's got 2 small areas with the right arm that show evidence of trauma but are healing in ok with no signs or symptoms  Of infection   SKIN: no suspicious lesions or rashes  NEURO: Normal strength and tone perhaps for his age but he's definitely showing his years , mentation intact and speech normal  PSYCH: mentation appears normal and affect normal/bright, detailed Folstein MiniMental Status Exam not done. Evidence of mild cognitive dysfunction of the elderly     Diagnostic test results:  Diagnostic Test Results:  Orders Placed This Encounter   Procedures     FOOT EXAM  NO CHARGE [24565.144]     HEMOGLOBIN A1C     Lipid panel reflex to direct LDL Fasting     Albumin Random Urine Quantitative with Creat Ratio     TSH WITH FREE T4 REFLEX     [facility] PT, HAND, AND CHIROPRACTIC REFERRAL          ASSESSMENT/PLAN:                                                    1. Falls frequently  Falls of the elderly is an all too common problem. It's a multifactorial problem and may be contributing factors from orthopaedically mediated issues , vision and hearing and balance problems . He may need a walker at this point. Needs a gait safety and strengthening referral with physical therapy   - [facility] PT, HAND, AND CHIROPRACTIC REFERRAL    2. Screening for diabetic peripheral neuropathy    - FOOT EXAM  NO CHARGE [12973.713]    3. Chronic atrial fibrillation (H)  Chronic issue    4. Liver replaced by transplant (H)  Ongoing care with Dr. Matt    5. Type 2 diabetes mellitus without complication, without long-term current use of insulin (H)  Marginal diagnosis   - HEMOGLOBIN A1C  - Albumin Random Urine Quantitative with Creat Ratio    6. Acquired hypothyroidism    - TSH WITH FREE T4  REFLEX    7. CARDIOVASCULAR SCREENING; LDL GOAL LESS THAN 100    - Lipid panel reflex to direct LDL Fasting    8. Physical deconditioning  As detailed above   - [facility] PT, HAND, AND CHIROPRACTIC REFERRAL      Follow up with Provider - 3-6 months / on an as needed basis      Jlius Marroquin MD  [facility]

## 2018-04-30 NOTE — TELEPHONE ENCOUNTER
Patient notified of Provider's message as written.  Patient verbalized understanding.  No prescription needed    Steffi Anthony RN

## 2018-05-01 ENCOUNTER — OFFICE VISIT (OUTPATIENT)
Dept: FAMILY MEDICINE | Facility: CLINIC | Age: 83
End: 2018-05-01
Payer: COMMERCIAL

## 2018-05-01 VITALS
RESPIRATION RATE: 14 BRPM | OXYGEN SATURATION: 97 % | HEART RATE: 81 BPM | HEIGHT: 62 IN | BODY MASS INDEX: 22.73 KG/M2 | SYSTOLIC BLOOD PRESSURE: 110 MMHG | DIASTOLIC BLOOD PRESSURE: 80 MMHG | TEMPERATURE: 97 F | WEIGHT: 123.5 LBS

## 2018-05-01 DIAGNOSIS — Z13.89 SCREENING FOR DIABETIC PERIPHERAL NEUROPATHY: ICD-10-CM

## 2018-05-01 DIAGNOSIS — R29.6 FALLS FREQUENTLY: Primary | ICD-10-CM

## 2018-05-01 DIAGNOSIS — R53.81 PHYSICAL DECONDITIONING: ICD-10-CM

## 2018-05-01 DIAGNOSIS — E11.9 TYPE 2 DIABETES MELLITUS WITHOUT COMPLICATION, WITHOUT LONG-TERM CURRENT USE OF INSULIN (H): ICD-10-CM

## 2018-05-01 DIAGNOSIS — I48.20 CHRONIC ATRIAL FIBRILLATION (H): ICD-10-CM

## 2018-05-01 DIAGNOSIS — Z13.6 CARDIOVASCULAR SCREENING; LDL GOAL LESS THAN 100: ICD-10-CM

## 2018-05-01 DIAGNOSIS — Z94.4 LIVER REPLACED BY TRANSPLANT (H): ICD-10-CM

## 2018-05-01 DIAGNOSIS — E03.9 ACQUIRED HYPOTHYROIDISM: ICD-10-CM

## 2018-05-01 LAB — HBA1C MFR BLD: 5.7 % (ref 0–5.6)

## 2018-05-01 PROCEDURE — 36415 COLL VENOUS BLD VENIPUNCTURE: CPT | Performed by: INTERNAL MEDICINE

## 2018-05-01 PROCEDURE — 99214 OFFICE O/P EST MOD 30 MIN: CPT | Performed by: INTERNAL MEDICINE

## 2018-05-01 PROCEDURE — 84443 ASSAY THYROID STIM HORMONE: CPT | Performed by: INTERNAL MEDICINE

## 2018-05-01 PROCEDURE — 80061 LIPID PANEL: CPT | Performed by: INTERNAL MEDICINE

## 2018-05-01 PROCEDURE — 82043 UR ALBUMIN QUANTITATIVE: CPT | Performed by: INTERNAL MEDICINE

## 2018-05-01 PROCEDURE — 99207 C FOOT EXAM  NO CHARGE: CPT | Performed by: INTERNAL MEDICINE

## 2018-05-01 PROCEDURE — 83036 HEMOGLOBIN GLYCOSYLATED A1C: CPT | Performed by: INTERNAL MEDICINE

## 2018-05-01 ASSESSMENT — PAIN SCALES - GENERAL: PAINLEVEL: EXTREME PAIN (8)

## 2018-05-01 NOTE — MR AVS SNAPSHOT
After Visit Summary   5/1/2018    Aman Baker    MRN: [id number]           Patient Information     Date Of Birth          5/6/1934        Visit Information        Provider Department      5/1/2018 2:50 PM Jluis Marroquin MD [facility]        Today's Diagnoses     Falls frequently    -  1    Screening for diabetic peripheral neuropathy        Chronic atrial fibrillation (H)        Liver replaced by transplant (H)        Type 2 diabetes mellitus without complication, without long-term current use of insulin (H)        Acquired hypothyroidism        CARDIOVASCULAR SCREENING; LDL GOAL LESS THAN 100        Physical deconditioning          Care Instructions    [facility]    If you have any questions regarding to your visit please contact your care team:     Team Pink:   Clinic Hours Telephone Number   Internal Medicine:  Dr. Diana Nguyen NP       7am-7pm  Monday - Thursday   7am-5pm  Fridays  [phone]  (Appointment scheduling available 24/7)    Questions about your recent visit?  Team Line  [phone]   Urgent Care - [place] [facility] - 11am-9pm Monday-Friday Saturday-Sunday- 9am-5pm   [place] - 5pm-9pm Monday-Friday Saturday-Sunday- 9am-5pm  [phone] - [place]  [phone] - [place]       What options do I have for a visit other than an office visit? We offer electronic visits (e-visits) and telephone visits, when medically appropriate.  Please check with your medical insurance to see if these types of visits are covered, as you will be responsible for any charges that are not paid by your insurance.      You can use [email or web address] (secure electronic communication) to access to your chart, send your primary care provider a message, or make an appointment. Ask a team member how to get started.     For a price quote for your services, please call our Consumer Price Line at [phone] or our Imaging Cost  estimation line at [phone] (for imaging tests).  Kalpana SHIELDS CMA ([facility])            Follow-ups after your visit        Additional Services     [facility] PT, HAND, AND CHIROPRACTIC REFERRAL       **This order will print in the [facility] Scheduling Office**    Physical Therapy, Hand Therapy and Chiropractic Care are available through:    *[facility]  *[facility]  *[facility]    Call one number to schedule at any of the above locations: [phone].    Your provider has referred you to: Physical Therapy at [facility] or [facility]    Indication/Reason for Referral: back pain  Onset of Illness:   Therapy Orders: Evaluate and Treat  Special Programs: None  Special Request: None    Jarett Denson      Additional Comments for the Therapist or Chiropractor: gait safety and strengthening referral     Please be aware that coverage of these services is subject to the terms and limitations of your health insurance plan.  Call member services at your health plan with any benefit or coverage questions.      Please bring the following to your appointment:    *Your personal calendar for scheduling future appointments  *Comfortable clothing                  Who to contact     If you have questions or need follow up information about today's clinic visit or your schedule please contact [facility] directly at [phone].  Normal or non-critical lab and imaging results will be communicated to you by MyChart, letter or phone within 4 business days after the clinic has received the results. If you do not hear from us within 7 days, please contact the clinic through MyChart or phone. If you have a critical or abnormal lab result, we will notify you by phone as soon as possible.  Submit refill requests through [employer] or call your pharmacy and they will forward the refill request to us. Please allow 3 business days for your refill to be completed.          Additional Information About  "Your Visit        [email or web address] Information     [employer] lets you send messages to your doctor, view your test results, renew your prescriptions, schedule appointments and more. To sign up, go to [email or web address] . Click on \"Log in\" on the left side of the screen, which will take you to the Welcome page. Then click on \"Sign up Now\" on the right side of the page.     You will be asked to enter the access code listed below, as well as some personal information. Please follow the directions to create your username and password.     Your access code is: [email or web address]  Expires: 2018  2:13 PM     Your access code will  in 90 days. If you need help or a new code, please call your [facility] clinic or [phone].        Care EveryWhere ID     This is your Care EveryWhere ID. This could be used by other organizations to access your [facility] medical records  [id number]        Your Vitals Were     Pulse Temperature Respirations Height Pulse Oximetry BMI (Body Mass Index)    81 97  F (36.1  C) (Oral) 14 5' 2\" (1.575 m) 97% 22.59 kg/m2       Blood Pressure from Last 3 Encounters:   18 110/80   18 100/63   18 98/68    Weight from Last 3 Encounters:   18 123 lb 8 oz (56 kg)   18 126 lb (57.2 kg)   18 126 lb (57.2 kg)              We Performed the Following     Albumin Random Urine Quantitative with Creat Ratio     FOOT EXAM  NO CHARGE [03180.114]     HEMOGLOBIN A1C     [facility] PT, HAND, AND CHIROPRACTIC REFERRAL     Lipid panel reflex to direct LDL Fasting     TSH WITH FREE T4 REFLEX        Primary Care Provider Office Phone # Fax #    Slade Yaritza-Javier [phone]       [facility] [address] [place] [address]        Equal Access to Services     [facility] JIMBO : Joni Pérez, peyton doan, neha santosalcharlette morales. [facility] [phone].    ATENCIÓN: Si ryne valadez, huma a flynn disposición " servicios gratuitos de asistencia lingüística. Eddi dowd [phone].    We comply with applicable federal civil rights laws and [place] laws. We do not discriminate on the basis of race, color, national origin, age, disability, sex, sexual orientation, or gender identity.            Thank you!     Thank you for choosing [facility]  for your care. Our goal is always to provide you with excellent care. Hearing back from our patients is one way we can continue to improve our services. Please take a few minutes to complete the written survey that you may receive in the mail after your visit with us. Thank you!             Your Updated Medication List - Protect others around you: Learn how to safely use, store and throw away your medicines at www.disposemymeds.org.          This list is accurate as of 5/1/18  3:38 PM.  Always use your most recent med list.                   Brand Name Dispense Instructions for use Diagnosis    ACE/ARB/ARNI NOT PRESCRIBED (INTENTIONAL)      Please choose reason not prescribed, below    Type 2 diabetes mellitus without complication, without long-term current use of insulin (H)       apixaban ANTICOAGULANT 2.5 MG tablet    ELIQUIS     Take 2.5 mg by mouth        calcitRIOL 0.25 MCG capsule    ROCALTROL     Take 1 capsule by mouth daily.        CALCIUM + D PO      Take 1 capsule daily        DAILY MULTIVITAMIN PO      Take 1 tablet daily        gabapentin 300 MG 24 hr tablet    GRALISE     Take 1 tablet twice daily        levothyroxine 50 MCG tablet    SYNTHROID/LEVOTHROID     Take 1 tablet by mouth daily.        magnesium 250 MG tablet      Take 1 tablet by mouth daily.        metoprolol tartrate 100 MG tablet    LOPRESSOR    180 tablet    Take 1 tablet (100 mg) by mouth 2 times daily    Chronic atrial fibrillation (H)       mycophenolate 250 MG capsule     540 capsule    Take 3 capsules (750 mg) by mouth 2 times daily    Liver replaced by transplant (H)       predniSONE 5  MG tablet    DELTASONE    90 tablet    TAKE ONE TABLET BY MOUTH EVERY DAY    Liver replaced by transplant (H)       simvastatin 20 MG tablet    ZOCOR    90 tablet    Take 1 tablet (20 mg) by mouth At Bedtime    Liver replaced by transplant (H), Lipids abnormal       vitamin D 1000 units capsule      Take 1 capsule by mouth 2 times daily.

## 2018-05-01 NOTE — PATIENT INSTRUCTIONS
[facility]    If you have any questions regarding to your visit please contact your care team:     Team Pink:   Clinic Hours Telephone Number   Internal Medicine:  Dr. Diana Nguyen NP       7am-7pm  Monday - Thursday   7am-5pm  Fridays  [phone]  (Appointment scheduling available 24/7)    Questions about your recent visit?  Team Line  [phone]   Ur[facility] [place] [facility] - 11am-9pm Monday-Friday Saturday-Sunday- 9am-5pm   [place] - 5pm-9pm Monday-Friday Saturday-Sunday- 9am-5pm  [phone] - [place]  [phone] - [place]       What options do I have for a visit other than an office visit? We offer electronic visits (e-visits) and telephone visits, when medically appropriate.  Please check with your medical insurance to see if these types of visits are covered, as you will be responsible for any charges that are not paid by your insurance.      You can use [employer] (secure electronic communication) to access to your chart, send your primary care provider a message, or make an appointment. Ask a team member how to get started.     For a price quote for your services, please call our Consumer Price Line at [phone] or our Imaging Cost estimation line at [phone] (for imaging tests).  Kalpana SHIELDS CMA ([facility])

## 2018-05-01 NOTE — LETTER
[facility]  [address]  [place] [address]    May 3, 2018    Aman Baker  [address] [facility] [address]          Dear Brielle Newton is a copy of your results. All of these tests are within acceptable limits. Things look okay.    Results for orders placed or performed in visit on 05/01/18   HEMOGLOBIN A1C   Result Value Ref Range    Hemoglobin A1C 5.7 (H) 0 - 5.6 %   Lipid panel reflex to direct LDL Fasting   Result Value Ref Range    Cholesterol 117 <200 mg/dL    Triglycerides 92 <150 mg/dL    HDL Cholesterol 38 (L) >39 mg/dL    LDL Cholesterol Calculated 61 <100 mg/dL    Non HDL Cholesterol 79 <130 mg/dL   Albumin Random Urine Quantitative with Creat Ratio   Result Value Ref Range    Creatinine Urine 180 mg/dL    Albumin Urine mg/L 26 mg/L    Albumin Urine mg/g Cr 14.33 0 - 17 mg/g Cr   TSH WITH FREE T4 REFLEX   Result Value Ref Range    TSH 1.11 0.40 - 4.00 mU/L     If you have any questions or concerns, please call myself or my nurse at [phone].    Sincerely,        Jluis Marroquin MD/rk

## 2018-05-02 LAB
CHOLEST SERPL-MCNC: 117 MG/DL
CREAT UR-MCNC: 180 MG/DL
HDLC SERPL-MCNC: 38 MG/DL
LDLC SERPL CALC-MCNC: 61 MG/DL
MICROALBUMIN UR-MCNC: 26 MG/L
MICROALBUMIN/CREAT UR: 14.33 MG/G CR (ref 0–17)
NONHDLC SERPL-MCNC: 79 MG/DL
TRIGL SERPL-MCNC: 92 MG/DL
TSH SERPL DL<=0.005 MIU/L-ACNC: 1.11 MU/L (ref 0.4–4)

## 2018-05-08 ENCOUNTER — THERAPY VISIT (OUTPATIENT)
Dept: PHYSICAL THERAPY | Facility: CLINIC | Age: 83
End: 2018-05-08
Payer: COMMERCIAL

## 2018-05-08 DIAGNOSIS — R26.89 LOSS OF BALANCE: ICD-10-CM

## 2018-05-08 DIAGNOSIS — R26.9 GAIT DIFFICULTY: Primary | ICD-10-CM

## 2018-05-08 PROCEDURE — 97161 PT EVAL LOW COMPLEX 20 MIN: CPT | Mod: GP | Performed by: PHYSICAL THERAPIST

## 2018-05-08 PROCEDURE — G8978 MOBILITY CURRENT STATUS: HCPCS | Mod: GP | Performed by: PHYSICAL THERAPIST

## 2018-05-08 PROCEDURE — G8979 MOBILITY GOAL STATUS: HCPCS | Mod: GP | Performed by: PHYSICAL THERAPIST

## 2018-05-08 PROCEDURE — 97110 THERAPEUTIC EXERCISES: CPT | Mod: GP | Performed by: PHYSICAL THERAPIST

## 2018-05-08 NOTE — PROGRESS NOTES
[facility] Initial Evaluation  Subjective:  HPI                    Objective:  System    Physical Exam    General     ROS    Assessment/Plan:    {REHAB NOTES:042950}    Jimenez Score   1  Sitting to Standing 2   2  Standing Unsupported 4   3  Sitting Unsupported Feet on Floor 4   4  Standing to Sitting 4   5  Transfers 2   6  Standing Unsupported with Eyes Closed 4   7  Standing Unsupported with Feet Together 3   8  Reaching Forward with Outstretched Arm 2   9   Object from Floor 4   10  Turning to Look Behind Shoulders 3   11  Turn 360 degrees 3   12  Stool Foot Touches 1   13  Tandem Stance 1   14  Single Leg Stance 0   Total 37/56

## 2018-05-08 NOTE — MR AVS SNAPSHOT
After Visit Summary   5/8/2018    Aman Baekr    MRN: [id number]           Patient Information     Date Of Birth          5/6/1934        Visit Information        Provider Department      5/8/2018 3:20 PM Bernardo Murillo, PT [facility]        Today's Diagnoses     Gait difficulty    -  1    Loss of balance           Follow-ups after your visit        Your next 10 appointments already scheduled     May 15, 2018  3:20 PM CDT   RAMA Extremity with Bernardo Murillo, PT   [facility] ([facility] [place])    [address] [facility]  [address]  [place] [address]   [phone]            May 22, 2018  9:30 AM CDT   RAMA Extremity with Tania Chapman, PTA   [facility] ([facility] [place])    [address] [facility]  [address]  [place] [address]   [phone]              Who to contact     If you have questions or need follow up information about today's clinic visit or your schedule please contact [facility] directly at [phone].  Normal or non-critical lab and imaging results will be communicated to you by Easy Solutionshart, letter or phone within 4 business days after the clinic has received the results. If you do not hear from us within 7 days, please contact the clinic through Viablewaret or phone. If you have a critical or abnormal lab result, we will notify you by phone as soon as possible.  Submit refill requests through Machinima or call your pharmacy and they will forward the refill request to us. Please allow 3 business days for your refill to be completed.          Additional Information About Your Visit        Easy Solutionshart Information     Mach[employer] gives you secure access to your electronic health record. If you see a primary care provider, you can also send messages to your care team and make appointments. If you have questions, please call your primary care clinic.  If you do not have a primary care provider, please call [phone] and they will assist you.        Care EveryWhere ID     This is your  Care EveryWhere ID. This could be used by other organizations to access your [place] medical records  [id number]         Blood Pressure from Last 3 Encounters:   05/01/18 110/80   04/27/18 100/63   03/23/18 98/68    Weight from Last 3 Encounters:   05/01/18 56 kg (123 lb 8 oz)   04/27/18 57.2 kg (126 lb)   03/23/18 57.2 kg (126 lb)              We Performed the Following     HC PT EVAL, LOW COMPLEXITY     [facility] CERT REPORT     THERAPEUTIC EXERCISES        Primary Care Provider Office Phone # Fax #    Slade Vigil-Javier [phone]       [facility] [address] [place] [address]        Equal Access to Services     KOKO CHOI : Hadii aad ku hadasho Sokatelynnali, waaxda luqadaha, qaybta kaalmada adeegyada, waxay idiin hayrowena finch . So [facility] [phone].    ATENCIÓN: Si habla español, tiene a flynn disposición servicios gratuitos de asistencia lingüística. [facility] [phone].    We comply with applicable federal civil rights laws and [place] laws. We do not discriminate on the basis of race, color, national origin, age, disability, sex, sexual orientation, or gender identity.            Thank you!     Thank you for choosing [facility]  for your care. Our goal is always to provide you with excellent care. Hearing back from our patients is one way we can continue to improve our services. Please take a few minutes to complete the written survey that you may receive in the mail after your visit with us. Thank you!             Your Updated Medication List - Protect others around you: Learn how to safely use, store and throw away your medicines at www.disposemymeds.org.          This list is accurate as of 5/8/18 11:59 PM.  Always use your most recent med list.                   Brand Name Dispense Instructions for use Diagnosis    ACE/ARB/ARNI NOT PRESCRIBED (INTENTIONAL)      Please choose reason not prescribed, below    Type 2 diabetes mellitus without complication,  without long-term current use of insulin (H)       apixaban ANTICOAGULANT 2.5 MG tablet    ELIQUIS     Take 2.5 mg by mouth        calcitRIOL 0.25 MCG capsule    ROCALTROL     Take 1 capsule by mouth daily.        CALCIUM + D PO      Take 1 capsule daily        DAILY MULTIVITAMIN PO      Take 1 tablet daily        gabapentin 300 MG 24 hr tablet    GRALISE     Take 1 tablet twice daily        levothyroxine 50 MCG tablet    SYNTHROID/LEVOTHROID     Take 1 tablet by mouth daily.        magnesium 250 MG tablet      Take 1 tablet by mouth daily.        metoprolol tartrate 100 MG tablet    LOPRESSOR    180 tablet    Take 1 tablet (100 mg) by mouth 2 times daily    Chronic atrial fibrillation (H)       mycophenolate 250 MG capsule     540 capsule    Take 3 capsules (750 mg) by mouth 2 times daily    Liver replaced by transplant (H)       predniSONE 5 MG tablet    DELTASONE    90 tablet    TAKE ONE TABLET BY MOUTH EVERY DAY    Liver replaced by transplant (H)       simvastatin 20 MG tablet    ZOCOR    90 tablet    Take 1 tablet (20 mg) by mouth At Bedtime    Liver replaced by transplant (H), Lipids abnormal       vitamin D 1000 units capsule      Take 1 capsule by mouth 2 times daily.

## 2018-05-08 NOTE — LETTER
[facility]  [address]  [address]  [place] [address]  [phone]    May 9, 2018    Re: Aman Bkaer   :   1934  MRN:  [id number]   REFERRING PHYSICIAN:   MD [facility]  Date of Initial Evaluation:  2018  Visits:     Reason for Referral:     Gait difficulty  Loss of balance    EVALUATION SUMMARY    [facility] Initial Evaluation  Subjective:  Patient is a 84 year old male presenting with rehab general hpi. The history is provided by the patient and a relative.   Aman Baker is a 84 year old male with weakness, deconditioning, poor balance and history of previous falls condition which occurred with . This is a recurrent condition  Pt describes progressive worsening of his walking quality and balance. He admits to having fallen several times this year.  He lives alone in his home, his wife is a resident in a LTC facility.  He describes his only pain as from bruises and injury related to his falls and reported as 4/10 (rib pain from fall). Associated symptoms:  Loss of balance and loss of strength. Pain is the same all the time.  Symptoms are exacerbated by activity, standing and transfers and relieved by rest. Since onset symptoms are gradually worsening.  General health as reported by patient is fair. Pertinent medical history includes:  Rheumatoid arthritis, osteoporosis, cancer, implanted device and thyroid problems (liver transplant , Spinal fusion ).Other surgeries include:  Orthopedic surgery (Back surgery ).Current medications:  Other and pain medication.  Current occupation is Retired, lives alone .          Barriers include:  Stairs.       Objective:    General Evaluation:  Lower Extremity Strength:  Strength wnl lower extremity general: hip abd and ext 2/5 bilat   Knee Extension: 4-      Knee Flexion: 3+      Ankle Plantarflexion: 2+      Ankle Dorsiflexion: 2+                Re: Aman Baker   :    5/6/1934    Balance:    Single Leg Stance--Eyes Open:  Left: 0/30 sec    Right: 0/30 sec  Dynamic Gait Index (4 item): est 5/12  Sit to Stand Test: 0/reps  Jimenez Balance Test: 37</56    Posture:  Posture wnl general: severe forward flexed posture w/ head down, correction only w/ cues   Standing:   Rounded shoulders, forward head, lordosis decreased, sway back and thoracic kyphosis increased  Sitting:  Rounded shoulders, forward head, lordosis decreased and thoracic kyphosis increased    Functional Assessment:  Functional assessment wnl: sit/stand only w/ UE assist     Assessment/Plan:    Patient is a 84 year old male with gait/balance  complaints.    Patient has the following significant findings with corresponding treatment plan.                Diagnosis 1:  Gait difficulty   Decreased ROM/flexibility - manual therapy, therapeutic exercise and home program  Decreased joint mobility - manual therapy and therapeutic exercise  Decreased strength - therapeutic exercise and therapeutic activities  Impaired balance - neuro re-education and therapeutic activities  Decreased proprioception - neuro re-education and therapeutic activities  Impaired gait - gait training  Impaired muscle performance - neuro re-education  Decreased function - therapeutic activities  Impaired posture - neuro re-education  Diagnosis 2:  Loss of balance    Decreased ROM/flexibility - manual therapy, therapeutic exercise and home program  Decreased joint mobility - manual therapy and therapeutic exercise  Decreased strength - therapeutic exercise and therapeutic activities  Impaired balance - neuro re-education and therapeutic activities  Decreased proprioception - neuro re-education and therapeutic activities  Impaired gait - gait training  Impaired muscle performance - neuro re-education  Decreased function - therapeutic activities    Therapy Evaluation Codes:   1) History comprised of:   Personal factors that impact the plan of care:      Age,  Coping style, Living environment, Overall behavior pattern and Past/current experiences.    Comorbidity factors that impact the plan of care are:      Cancer, Implanted device, Osteoarthritis, Rheumatoid arthritis, Weakness and severe posture deformity, liver transplant, thyroid, agedness .     Medications impacting care: None.  2) Examination of Body Systems comprised of:   Body structures and functions that impact the plan of care:      Ankle, Hip, Knee and Thoracic Spine.  Re: Aman Baker   :   1934     Activity limitations that impact the plan of care are:      Bathing, Cooking, Dressing, Lifting, Squatting/kneeling, Stairs, Standing, Walking and transfers .  3) Clinical presentation characteristics are:   Stable/Uncomplicated.  4) Decision-Making    Low complexity using standardized patient assessment instrument and/or measureable assessment of functional outcome.  Cumulative Therapy Evaluation is: Low complexity.    Previous and current functional limitations:  (See Goal Flow Sheet for this information)    Short term and Long term goals: (See Goal Flow Sheet for this information)     Communication ability:  Patient appears to be able to clearly communicate and understand verbal and written communication and follow directions correctly.  Treatment Explanation - The following has been discussed with the patient:   RX ordered/plan of care  Anticipated outcomes  Possible risks and side effects  This patient would benefit from PT intervention to resume normal activities.   Rehab potential is good.    Frequency:  1 X week, once daily  Duration:  for 12 weeks  Discharge Plan:  Achieve all LTG.  Independent in home treatment program.  Reach maximal therapeutic benefit.    Please refer to the daily flowsheet for treatment today, total treatment time and time spent performing 1:1 timed codes.         Thank you for your referral.    INQUIRIES  Therapist: Bernardo Murillo PT  [facility]  [address]  [place]  [address] [address]  Phone: [phone]  Fax: [phone]

## 2018-05-08 NOTE — PROGRESS NOTES
[facility] Initial Evaluation  Subjective:  Patient is a 84 year old male presenting with rehab general hpi. The history is provided by the patient and a relative.   Aman Baker is a 84 year old male with weakness, deconditioning, poor balance and history of previous falls condition which occurred with .      This is a recurrent condition  Pt describes progressive worsening of his walking quality and balance. He admits to having fallen several times this year.  He lives alone in his home, his wife is a resident in a LTC facility.  He describes his only pain as from bruises and injury related to his falls. .            and reported as 4/10 (rib pain from fall). Associated symptoms:  Loss of balance and loss of strength. Pain is the same all the time.  Symptoms are exacerbated by activity, standing and transfers and relieved by rest. Since onset symptoms are gradually worsening.          General health as reported by patient is fair. Pertinent medical history includes:  Rheumatoid arthritis, osteoporosis, cancer, implanted device and thyroid problems (liver transplant 2004, Spinal fusion ).Other surgeries include:  Orthopedic surgery (Back surgery ).Current medications:  Other and pain medication.  Current occupation is Retired, lives alone .          Barriers include:  Stairs.                              Objective:  System    Physical Exam        General Evaluation:        Lower Extremity Strength:  Strength wnl lower extremity general: hip abd and ext 2/5 bilat   Knee Extension: 4-      Knee Flexion: 3+      Ankle Plantarflexion: 2+      Ankle Dorsiflexion: 2+                  Balance:    Single Leg Stance--Eyes Open:  Left: 0/30 sec    Right: 0/30 sec    Dynamic Gait Index (4 item): est 5/12  Sit to Stand Test: 0/reps  Jimenez Balance Test: 37</56  Posture:  Posture wnl general: severe forward flexed posture w/ head down, correction only w/ cues   Standing:   Rounded shoulders, forward head,  lordosis decreased, sway back and thoracic kyphosis increased  Sitting:  Rounded shoulders, forward head, lordosis decreased and thoracic kyphosis increased  Functional Assessment:  Functional assessment wnl: sit/stand only w/ UE assist                                                ROS    Assessment/Plan:    Patient is a 84 year old male with gait/balance  complaints.    Patient has the following significant findings with corresponding treatment plan.                Diagnosis 1:  Gait difficulty   Decreased ROM/flexibility - manual therapy, therapeutic exercise and home program  Decreased joint mobility - manual therapy and therapeutic exercise  Decreased strength - therapeutic exercise and therapeutic activities  Impaired balance - neuro re-education and therapeutic activities  Decreased proprioception - neuro re-education and therapeutic activities  Impaired gait - gait training  Impaired muscle performance - neuro re-education  Decreased function - therapeutic activities  Impaired posture - neuro re-education  Diagnosis 2:  Loss of balance    Decreased ROM/flexibility - manual therapy, therapeutic exercise and home program  Decreased joint mobility - manual therapy and therapeutic exercise  Decreased strength - therapeutic exercise and therapeutic activities  Impaired balance - neuro re-education and therapeutic activities  Decreased proprioception - neuro re-education and therapeutic activities  Impaired gait - gait training  Impaired muscle performance - neuro re-education  Decreased function - therapeutic activities    Therapy Evaluation Codes:   1) History comprised of:   Personal factors that impact the plan of care:      Age, Coping style, Living environment, Overall behavior pattern and Past/current experiences.    Comorbidity factors that impact the plan of care are:      Cancer, Implanted device, Osteoarthritis, Rheumatoid arthritis, Weakness and severe posture deformity, liver transplant, thyroid,  agedness .     Medications impacting care: None.  2) Examination of Body Systems comprised of:   Body structures and functions that impact the plan of care:      Ankle, Hip, Knee and Thoracic Spine.   Activity limitations that impact the plan of care are:      Bathing, Cooking, Dressing, Lifting, Squatting/kneeling, Stairs, Standing, Walking and transfers .  3) Clinical presentation characteristics are:   Stable/Uncomplicated.  4) Decision-Making    Low complexity using standardized patient assessment instrument and/or measureable assessment of functional outcome.  Cumulative Therapy Evaluation is: Low complexity.    Previous and current functional limitations:  (See Goal Flow Sheet for this information)    Short term and Long term goals: (See Goal Flow Sheet for this information)     Communication ability:  Patient appears to be able to clearly communicate and understand verbal and written communication and follow directions correctly.  Treatment Explanation - The following has been discussed with the patient:   RX ordered/plan of care  Anticipated outcomes  Possible risks and side effects  This patient would benefit from PT intervention to resume normal activities.   Rehab potential is good.    Frequency:  1 X week, once daily  Duration:  for 12 weeks  Discharge Plan:  Achieve all LTG.  Independent in home treatment program.  Reach maximal therapeutic benefit.    Please refer to the daily flowsheet for treatment today, total treatment time and time spent performing 1:1 timed codes.

## 2018-05-14 DIAGNOSIS — I48.20 CHRONIC ATRIAL FIBRILLATION (H): Primary | ICD-10-CM

## 2018-05-16 NOTE — TELEPHONE ENCOUNTER
Routing refill request to provider for review/approval because:  Medication is reported/historical      Requested Prescriptions   Pending Prescriptions Disp Refills     apixaban ANTICOAGULANT (ELIQUIS) 2.5 MG tablet       Sig: Take 1 tablet (2.5 mg) by mouth    There is no refill protocol information for this order        Palak Horvath, KRYSTLE - BC

## 2018-05-17 NOTE — TELEPHONE ENCOUNTER
I believe I made an error. Please call patient / pharmacy. I think it's once a day he take4s it but I wrote for 2 times a day     Jluis Marroquin MD

## 2018-05-17 NOTE — TELEPHONE ENCOUNTER
Called pharmacy and pharmacist advised that pt's previous dose has been 1 tablet twice. So current rx is correct.    Sabrina Simpson RN  [facility]

## 2018-06-07 DIAGNOSIS — E78.89 LIPIDS ABNORMAL: ICD-10-CM

## 2018-06-07 DIAGNOSIS — Z94.4 LIVER REPLACED BY TRANSPLANT (H): ICD-10-CM

## 2018-06-07 RX ORDER — SIMVASTATIN 20 MG
TABLET ORAL
Qty: 90 TABLET | Refills: 2 | Status: SHIPPED | OUTPATIENT
Start: 2018-06-07 | End: 2018-01-01

## 2018-06-12 ENCOUNTER — OFFICE VISIT (OUTPATIENT)
Dept: AUDIOLOGY | Facility: CLINIC | Age: 83
End: 2018-06-12
Payer: COMMERCIAL

## 2018-06-12 DIAGNOSIS — H90.6 MIXED HEARING LOSS, BILATERAL: Primary | ICD-10-CM

## 2018-06-12 PROCEDURE — 99207 ZZC NO CHARGE LOS: CPT | Performed by: AUDIOLOGIST

## 2018-06-12 PROCEDURE — V5299 HEARING SERVICE: HCPCS | Performed by: AUDIOLOGIST

## 2018-06-12 NOTE — MR AVS SNAPSHOT
After Visit Summary   6/12/2018    Aman Baker    MRN: [id number]           Patient Information     Date Of Birth          5/6/1934        Visit Information        Provider Department      6/12/2018 1:00 PM [facility]        Today's Diagnoses     Mixed hearing loss, bilateral    -  1       Follow-ups after your visit        Who to contact     If you have questions or need follow up information about today's clinic visit or your schedule please contact [facility] directly at [phone].  Normal or non-critical lab and imaging results will be communicated to you by SafetySkillshart, letter or phone within 4 business days after the clinic has received the results. If you do not hear from us within 7 days, please contact the clinic through MyLorryt or phone. If you have a critical or abnormal lab result, we will notify you by phone as soon as possible.  Submit refill requests through [employer] or call your pharmacy and they will forward the refill request to us. Please allow 3 business days for your refill to be completed.          Additional Information About Your Visit        MyChart Information     [employer] gives you secure access to your electronic health record. If you see a primary care provider, you can also send messages to your care team and make appointments. If you have questions, please call your primary care clinic.  If you do not have a primary care provider, please call [phone] and they will assist you.        Care EveryWhere ID     This is your Care EveryWhere ID. This could be used by other organizations to access your [place] medical records  [id number]         Blood Pressure from Last 3 Encounters:   05/01/18 110/80   04/27/18 100/63   03/23/18 98/68    Weight from Last 3 Encounters:   05/01/18 123 lb 8 oz (56 kg)   04/27/18 126 lb (57.2 kg)   03/23/18 126 lb (57.2 kg)              We Performed the Following     HEARING AID CHECK/NO  CHARGE        Primary Care Provider Office Phone # Fax #    Slade Eldridge [phone]       [facility] [address] [place] [address]        Equal Access to Services     KOKO CHOI : Hadjoseph blanchard ku yaquelino Sokatelynnali, waaxda luqadaha, qaybta kaalmada adeegyada, waxolive idiin hayaan burke moya laTabatharowena desai. So [facility] [phone].    ATENCIÓN: Si habla español, tiene a flynn disposición servicios gratuitos de asistencia lingüística. Llame al [phone].    We comply with applicable federal civil rights laws and [place] laws. We do not discriminate on the basis of race, color, national origin, age, disability, sex, sexual orientation, or gender identity.            Thank you!     Thank you for choosing [facility]  for your care. Our goal is always to provide you with excellent care. Hearing back from our patients is one way we can continue to improve our services. Please take a few minutes to complete the written survey that you may receive in the mail after your visit with us. Thank you!             Your Updated Medication List - Protect others around you: Learn how to safely use, store and throw away your medicines at www.disposemymeds.org.          This list is accurate as of 6/12/18  1:35 PM.  Always use your most recent med list.                   Brand Name Dispense Instructions for use Diagnosis    ACE/ARB/ARNI NOT PRESCRIBED (INTENTIONAL)      Please choose reason not prescribed, below    Type 2 diabetes mellitus without complication, without long-term current use of insulin (H)       apixaban ANTICOAGULANT 2.5 MG tablet    ELIQUIS    180 tablet    Take 1 tablet (2.5 mg) by mouth 2 times daily    Chronic atrial fibrillation (H)       calcitRIOL 0.25 MCG capsule    ROCALTROL     Take 1 capsule by mouth daily.        CALCIUM + D PO      Take 1 capsule daily        DAILY MULTIVITAMIN PO      Take 1 tablet daily        gabapentin 300 MG 24 hr tablet    GRALISE     Take  1 tablet twice daily        levothyroxine 50 MCG tablet    SYNTHROID/LEVOTHROID     Take 1 tablet by mouth daily.        magnesium 250 MG tablet      Take 1 tablet by mouth daily.        metoprolol tartrate 100 MG tablet    LOPRESSOR    180 tablet    Take 1 tablet (100 mg) by mouth 2 times daily    Chronic atrial fibrillation (H)       mycophenolate 250 MG capsule     540 capsule    Take 3 capsules (750 mg) by mouth 2 times daily    Liver replaced by transplant (H)       predniSONE 5 MG tablet    DELTASONE    90 tablet    TAKE ONE TABLET BY MOUTH EVERY DAY    Liver replaced by transplant (H)       simvastatin 20 MG tablet    ZOCOR    90 tablet    TAKE 1 TABLET (20 MG) BY MOUTH AT BEDTIME    Liver replaced by transplant (H), Lipids abnormal       vitamin D 1000 units capsule      Take 1 capsule by mouth 2 times daily.

## 2018-06-12 NOTE — PROGRESS NOTES
AUDIOLOGY REPORT: HEARING AID RECHECK    SUBJECTIVE: Aman Baker is a 84 year old male, :  1934, was seen in the Audiology Clinic at  [facility] on 18 for a return check of their hearing aids. The patient was accompanied by their daughter, Kathleen.      Background:   Patient is here today with the complaint of he is not hearing well.     Procedures:   The right ear Phonak hearing aid was found to be non-functional and would have to be sent in for repair. Once the right ear is back from repair Kathleen will be called to set up a 30 minute hearing aid check for further fine tuning of the hearing aids.    Plan:   Patient will return as needed for hearing aid concerns.     NO CHARGE VISIT    Godfrey Robles CCC-A  Licensed Audiologist  2018

## 2018-06-20 ENCOUNTER — TELEPHONE (OUTPATIENT)
Dept: AUDIOLOGY | Facility: CLINIC | Age: 83
End: 2018-06-20

## 2018-06-20 NOTE — TELEPHONE ENCOUNTER
Left message for patient's daughter, Kathleen, informing her that patient's hearing aid is back from repair. Per Dr. Mccracken's note from patient's last visit, directed Kathleen to schedule an appointment for reprogramming of the hearing aids. Provided Kathleen with the phone number for scheduling.     Santiago Milligan, KOKI-A

## 2018-06-21 ENCOUNTER — APPOINTMENT (OUTPATIENT)
Dept: ULTRASOUND IMAGING | Facility: CLINIC | Age: 83
End: 2018-06-21
Attending: STUDENT IN AN ORGANIZED HEALTH CARE EDUCATION/TRAINING PROGRAM
Payer: MEDICARE

## 2018-06-21 ENCOUNTER — HOSPITAL ENCOUNTER (EMERGENCY)
Facility: CLINIC | Age: 83
Discharge: HOME | End: 2018-06-21
Attending: STUDENT IN AN ORGANIZED HEALTH CARE EDUCATION/TRAINING PROGRAM | Admitting: STUDENT IN AN ORGANIZED HEALTH CARE EDUCATION/TRAINING PROGRAM
Payer: MEDICARE

## 2018-06-21 VITALS
TEMPERATURE: 98.1 F | HEART RATE: 90 BPM | OXYGEN SATURATION: 99 % | DIASTOLIC BLOOD PRESSURE: 64 MMHG | SYSTOLIC BLOOD PRESSURE: 105 MMHG | RESPIRATION RATE: 20 BRPM

## 2018-06-21 DIAGNOSIS — L03.116 CELLULITIS OF LEFT LOWER EXTREMITY: ICD-10-CM

## 2018-06-21 DIAGNOSIS — R60.0 LOWER EXTREMITY EDEMA: ICD-10-CM

## 2018-06-21 LAB
ALBUMIN SERPL-MCNC: 3.4 G/DL (ref 3.4–5)
ALP SERPL-CCNC: 69 U/L (ref 40–150)
ALT SERPL W P-5'-P-CCNC: 10 U/L (ref 0–70)
ANION GAP SERPL CALCULATED.3IONS-SCNC: 5 MMOL/L (ref 3–14)
APTT PPP: 36 SEC (ref 22–37)
AST SERPL W P-5'-P-CCNC: 15 U/L (ref 0–45)
BASOPHILS # BLD AUTO: 0 10E9/L (ref 0–0.2)
BASOPHILS NFR BLD AUTO: 0.6 %
BILIRUB SERPL-MCNC: 0.3 MG/DL (ref 0.2–1.3)
BUN SERPL-MCNC: 13 MG/DL (ref 7–30)
CALCIUM SERPL-MCNC: 7.9 MG/DL (ref 8.5–10.1)
CHLORIDE SERPL-SCNC: 107 MMOL/L (ref 94–109)
CO2 SERPL-SCNC: 29 MMOL/L (ref 20–32)
CREAT SERPL-MCNC: 1.02 MG/DL (ref 0.66–1.25)
DIFFERENTIAL METHOD BLD: ABNORMAL
EOSINOPHIL # BLD AUTO: 0.1 10E9/L (ref 0–0.7)
EOSINOPHIL NFR BLD AUTO: 2.8 %
ERYTHROCYTE [DISTWIDTH] IN BLOOD BY AUTOMATED COUNT: 13.9 % (ref 10–15)
GFR SERPL CREATININE-BSD FRML MDRD: 70 ML/MIN/1.7M2
GLUCOSE SERPL-MCNC: 78 MG/DL (ref 70–99)
HCT VFR BLD AUTO: 35.9 % (ref 40–53)
HGB BLD-MCNC: 10.8 G/DL (ref 13.3–17.7)
IMM GRANULOCYTES # BLD: 0 10E9/L (ref 0–0.4)
IMM GRANULOCYTES NFR BLD: 0.6 %
INR PPP: 1.24 (ref 0.86–1.14)
LYMPHOCYTES # BLD AUTO: 0.3 10E9/L (ref 0.8–5.3)
LYMPHOCYTES NFR BLD AUTO: 9.1 %
MCH RBC QN AUTO: 29 PG (ref 26.5–33)
MCHC RBC AUTO-ENTMCNC: 30.1 G/DL (ref 31.5–36.5)
MCV RBC AUTO: 97 FL (ref 78–100)
MONOCYTES # BLD AUTO: 0.5 10E9/L (ref 0–1.3)
MONOCYTES NFR BLD AUTO: 14.6 %
NEUTROPHILS # BLD AUTO: 2.6 10E9/L (ref 1.6–8.3)
NEUTROPHILS NFR BLD AUTO: 72.3 %
NRBC # BLD AUTO: 0 10*3/UL
NRBC BLD AUTO-RTO: 0 /100
NT-PROBNP SERPL-MCNC: 2770 PG/ML (ref 0–1800)
PLATELET # BLD AUTO: 223 10E9/L (ref 150–450)
POTASSIUM SERPL-SCNC: 4.2 MMOL/L (ref 3.4–5.3)
PROT SERPL-MCNC: 6.8 G/DL (ref 6.8–8.8)
RBC # BLD AUTO: 3.72 10E12/L (ref 4.4–5.9)
SODIUM SERPL-SCNC: 141 MMOL/L (ref 133–144)
WBC # BLD AUTO: 3.6 10E9/L (ref 4–11)

## 2018-06-21 PROCEDURE — 99284 EMERGENCY DEPT VISIT MOD MDM: CPT | Mod: 25 | Performed by: STUDENT IN AN ORGANIZED HEALTH CARE EDUCATION/TRAINING PROGRAM

## 2018-06-21 PROCEDURE — 80053 COMPREHEN METABOLIC PANEL: CPT | Performed by: STUDENT IN AN ORGANIZED HEALTH CARE EDUCATION/TRAINING PROGRAM

## 2018-06-21 PROCEDURE — 93971 EXTREMITY STUDY: CPT | Mod: LT

## 2018-06-21 PROCEDURE — 85730 THROMBOPLASTIN TIME PARTIAL: CPT | Performed by: STUDENT IN AN ORGANIZED HEALTH CARE EDUCATION/TRAINING PROGRAM

## 2018-06-21 PROCEDURE — 85025 COMPLETE CBC W/AUTO DIFF WBC: CPT | Performed by: STUDENT IN AN ORGANIZED HEALTH CARE EDUCATION/TRAINING PROGRAM

## 2018-06-21 PROCEDURE — 99284 EMERGENCY DEPT VISIT MOD MDM: CPT | Mod: Z6 | Performed by: STUDENT IN AN ORGANIZED HEALTH CARE EDUCATION/TRAINING PROGRAM

## 2018-06-21 PROCEDURE — 85610 PROTHROMBIN TIME: CPT | Performed by: STUDENT IN AN ORGANIZED HEALTH CARE EDUCATION/TRAINING PROGRAM

## 2018-06-21 PROCEDURE — 83880 ASSAY OF NATRIURETIC PEPTIDE: CPT | Performed by: STUDENT IN AN ORGANIZED HEALTH CARE EDUCATION/TRAINING PROGRAM

## 2018-06-21 RX ORDER — CLINDAMYCIN HCL 150 MG
450 CAPSULE ORAL 3 TIMES DAILY
Qty: 90 CAPSULE | Refills: 0 | Status: SHIPPED | OUTPATIENT
Start: 2018-06-21 | End: 2018-07-01

## 2018-06-21 RX ORDER — FUROSEMIDE 20 MG
20 TABLET ORAL DAILY
Qty: 30 TABLET | Refills: 0 | Status: SHIPPED | OUTPATIENT
Start: 2018-06-21 | End: 2019-01-01

## 2018-06-21 NOTE — ED NOTES
Lives by himself. Daughter in law went to see him and noticed a sore with redness and swelling to left leg. Pt states he noticied the swelling but not the sore one month ago. Had liver transplant 2004.

## 2018-06-21 NOTE — ED AVS SNAPSHOT
[facility] Emergency Department    [address] [address]    Phone:  [phone]    Fax:  [phone]                                       Aman Baker   MRN: [id number]    Department:  [facility] Emergency Department   Date of Visit:  6/21/2018           Patient Information     Date Of Birth          5/6/1934        Your diagnoses for this visit were:     Lower extremity edema     Cellulitis of left lower extremity        You were seen by Pankaj Donato DO.      Follow-up Information     Follow up with [facility]. Go in 4 days.    Why:  Followup for reevaluation at your scheduled appointment Monday afternoon, 6/25/18, 3:40 PM.    Contact information:    [address] [address]  [phone]        Discharge References/Attachments     LEG SWELLING IN BOTH LEGS (ENGLISH)    SKIN INFECTION, CELLULITIS (ENGLISH)      Your next 10 appointments already scheduled     Jun 25, 2018  3:40 PM CDT   [facility] Visit with Juan Fowler MD   [facility]    [address] [address] [phone]           Bring a current list of meds and any records pertaining to this visit. For Physicals, please bring immunization records and any forms needing to be filled out. Please arrive 10 minutes early to complete paperwork.              24 Hour Appointment Hotline       To make an appointment at any [facility], call [phone] ([phone]). If you don't have a family doctor or clinic, we will help you find one. [facility] are conveniently located to serve the needs of you and your family.             Review of your medicines      START taking        Dose / Directions Last dose taken    clindamycin 150 MG capsule   Commonly known as:  CLEOCIN   Dose:  450 mg   Quantity:  90 capsule        Take 3 capsules (450 mg) by mouth 3 times daily for 10 days   Refills:  0        furosemide 20 MG  tablet   Commonly known as:  LASIX   Dose:  20 mg   Quantity:  30 tablet        Take 1 tablet (20 mg) by mouth daily   Refills:  0          Our records show that you are taking the medicines listed below. If these are incorrect, please call your family doctor or clinic.        Dose / Directions Last dose taken    ACE/ARB/ARNI NOT PRESCRIBED (INTENTIONAL)        Please choose reason not prescribed, below   Refills:  0        apixaban ANTICOAGULANT 2.5 MG tablet   Commonly known as:  ELIQUIS   Dose:  2.5 mg   Quantity:  180 tablet        Take 1 tablet (2.5 mg) by mouth 2 times daily   Refills:  1        calcitRIOL 0.25 MCG capsule   Commonly known as:  ROCALTROL   Dose:  1 capsule        Take 1 capsule by mouth daily.   Refills:  0        DAILY MULTIVITAMIN PO        Take 1 tablet daily   Refills:  0        GABAPENTIN PO   Dose:  300 mg        Take 300 mg by mouth 3 times daily   Refills:  0        levothyroxine 50 MCG tablet   Commonly known as:  SYNTHROID/LEVOTHROID   Dose:  1 tablet        Take 1 tablet by mouth daily.   Refills:  0        magnesium 250 MG tablet   Dose:  1 tablet        Take 1 tablet by mouth daily.   Refills:  0        metoprolol tartrate 100 MG tablet   Commonly known as:  LOPRESSOR   Dose:  100 mg   Quantity:  180 tablet        Take 1 tablet (100 mg) by mouth 2 times daily   Refills:  3        mycophenolate 250 MG capsule   Dose:  750 mg   Quantity:  540 capsule        Take 3 capsules (750 mg) by mouth 2 times daily   Refills:  3        predniSONE 5 MG tablet   Commonly known as:  DELTASONE   Quantity:  90 tablet        TAKE ONE TABLET BY MOUTH EVERY DAY   Refills:  3        simvastatin 20 MG tablet   Commonly known as:  ZOCOR   Quantity:  90 tablet        TAKE 1 TABLET (20 MG) BY MOUTH AT BEDTIME   Refills:  2        TRAMADOL HCL PO   Dose:  50 mg        Take 50 mg by mouth 2 times daily   Refills:  0        vitamin D 1000 units capsule   Dose:  1 capsule        Take 1 capsule by mouth 2 times  daily.   Refills:  0                Information about OPIOIDS     PRESCRIPTION OPIOIDS: WHAT YOU NEED TO KNOW   We gave you an opioid (narcotic) pain medicine. It is important to manage your pain, but opioids are not always the best choice. You should first try all the other options your care team gave you. Take this medicine for as short a time (and as few doses) as possible.     These medicines have risks:    DO NOT drive when on new or higher doses of pain medicine. These medicines can affect your alertness and reaction times, and you could be arrested for driving under the influence (DUI). If you need to use opioids long-term, talk to your care team about driving.    DO NOT operate heave machinery    DO NOT do any other dangerous activities while taking these medicines.     DO NOT drink any alcohol while taking these medicines.      If the opioid prescribed includes acetaminophen, DO NOT take with any other medicines that contain acetaminophen. Read all labels carefully. Look for the word  acetaminophen  or  Tylenol.  Ask your pharmacist if you have questions or are unsure.    You can get addicted to pain medicines, especially if you have a history of addiction (chemical, alcohol or substance dependence). Talk to your care team about ways to reduce this risk.    Store your pills in a secure place, locked if possible. We will not replace any lost or stolen medicine. If you don t finish your medicine, please throw away (dispose) as directed by your pharmacist. The [place] [employer] has more information about safe disposal: https://www.pca.[facility].[place]/living-green/managing-unwanted-medications.     All opioids tend to cause constipation. Drink plenty of water and eat foods that have a lot of fiber, such as fruits, vegetables, prune juice, apple juice and high-fiber cereal. Take a laxative (Miralax, milk of magnesia, Colace, Senna) if you don t move your bowels at least every other day.          Prescriptions were sent or printed at these locations (2 Prescriptions)                   [facility] [place] - [address]   [address], [place] [address]    Telephone:  [phone]   Fax:  [phone]   Hours:                  E-Prescribed (2 of 2)         clindamycin (CLEOCIN) 150 MG capsule               furosemide (LASIX) 20 MG tablet                Procedures and tests performed during your visit     CBC with platelets, differential    Comprehensive metabolic panel    INR    NT pro BNP    PTT    US Lower Extremity Venous Duplex Left      Orders Needing Specimen Collection     None      Pending Results     No orders found from 6/19/2018 to 6/22/2018.            Pending Culture Results     No orders found from 6/19/2018 to 6/22/2018.            Pending Results Instructions     If you had any lab results that were not finalized at the time of your Discharge, you can call the ED Lab Result RN at [phone]. You will be contacted by this team for any positive Lab results or changes in treatment. The nurses are available 7 days a week from 10A to 6:30P.  You can leave a message 24 hours per day and they will return your call.        Test Results From Your Hospital Stay        6/21/2018  6:34 PM      Component Results     Component Value Ref Range & Units Status    WBC 3.6 (L) 4.0 - 11.0 10e9/L Final    RBC Count 3.72 (L) 4.4 - 5.9 10e12/L Final    Hemoglobin 10.8 (L) 13.3 - 17.7 g/dL Final    Hematocrit 35.9 (L) 40.0 - 53.0 % Final    MCV 97 78 - 100 fl Final    MCH 29.0 26.5 - 33.0 pg Final    MCHC 30.1 (L) 31.5 - 36.5 g/dL Final    RDW 13.9 10.0 - 15.0 % Final    Platelet Count 223 150 - 450 10e9/L Final    Diff Method Automated Method  Final    % Neutrophils 72.3 % Final    % Lymphocytes 9.1 % Final    % Monocytes 14.6 % Final    % Eosinophils 2.8 % Final    % Basophils 0.6 % Final    % Immature Granulocytes 0.6 % Final    Nucleated RBCs 0 0 /100 Final    Absolute  Neutrophil 2.6 1.6 - 8.3 10e9/L Final    Absolute Lymphocytes 0.3 (L) 0.8 - 5.3 10e9/L Final    Absolute Monocytes 0.5 0.0 - 1.3 10e9/L Final    Absolute Eosinophils 0.1 0.0 - 0.7 10e9/L Final    Absolute Basophils 0.0 0.0 - 0.2 10e9/L Final    Abs Immature Granulocytes 0.0 0 - 0.4 10e9/L Final    Absolute Nucleated RBC 0.0  Final         6/21/2018  6:51 PM      Component Results     Component Value Ref Range & Units Status    Sodium 141 133 - 144 mmol/L Final    Potassium 4.2 3.4 - 5.3 mmol/L Final    Chloride 107 94 - 109 mmol/L Final    Carbon Dioxide 29 20 - 32 mmol/L Final    Anion Gap 5 3 - 14 mmol/L Final    Glucose 78 70 - 99 mg/dL Final    Urea Nitrogen 13 7 - 30 mg/dL Final    Creatinine 1.02 0.66 - 1.25 mg/dL Final    GFR Estimate 70 >60 mL/min/1.7m2 Final    Non  GFR Calc    GFR Estimate If Black 84 >60 mL/min/1.7m2 Final    African American GFR Calc    Calcium 7.9 (L) 8.5 - 10.1 mg/dL Final    Bilirubin Total 0.3 0.2 - 1.3 mg/dL Final    Albumin 3.4 3.4 - 5.0 g/dL Final    Protein Total 6.8 6.8 - 8.8 g/dL Final    Alkaline Phosphatase 69 40 - 150 U/L Final    ALT 10 0 - 70 U/L Final    AST 15 0 - 45 U/L Final         6/21/2018  6:37 PM      Component Results     Component Value Ref Range & Units Status    INR 1.24 (H) 0.86 - 1.14 Final         6/21/2018  6:37 PM      Component Results     Component Value Ref Range & Units Status    PTT 36 22 - 37 sec Final         6/21/2018  6:51 PM      Component Results     Component Value Ref Range & Units Status    N-Terminal Pro BNP Inpatient 2770 (H) 0 - 1800 pg/mL Final       Reference range shown and results flagged as abnormal are suggested inpatient   cut points for confirming diagnosis if CHF in an acute setting. Establishing a   baseline value for each individual patient is useful for follow-up. An   inpatient or emergency department NT-proPBNP <300 pg/mL effectively rules out   acute CHF, with 99% negative predictive value.  The outpatient  non-acute reference range for ruling out CHF is:   0-125 pg/mL (age 18 to less than 75)   0-450 pg/mL (age 75 yrs and older)           6/21/2018  7:30 PM      Narrative     ULTRASOUND VENOUS LOWER EXTREMITY UNILATERAL LEFT  6/21/2018 7:04 PM     HISTORY: Unilateral edema.      COMPARISON: None.    TECHNIQUE: Ultrasound gray scale, Color Doppler flow, and spectral  Doppler waveform analysis performed.    FINDINGS: The left common femoral, superficial femoral, popliteal and  posterior tibial veins are patent and fully compressible and  demonstrate normal venous Doppler flow. The visualized greater  saphenous vein is negative for thrombus. For comparison the right  common femoral vein was evaluated and was unremarkable.        Impression     IMPRESSION: No DVT demonstrated.     CLARKE SANDOVAL MD                Thank you for choosing [facility]       Thank you for choosing [facility] for your care. Our goal is always to provide you with excellent care. Hearing back from our patients is one way we can continue to improve our services. Please take a few minutes to complete the written survey that you may receive in the mail after you visit with us. Thank you!        [employer] Information     [employer] gives you secure access to your electronic health record. If you see a primary care provider, you can also send messages to your care team and make appointments. If you have questions, please call your primary care clinic.  If you do not have a primary care provider, please call [phone] and they will assist you.        Care EveryWhere ID     This is your Care EveryWhere ID. This could be used by other organizations to access your [facility] medical records  [id number]        Equal Access to Services     KOKO CHOI : Joni Pérez, peyton doan, qacharlette damian. So [facility] [phone].    ATENCIÓN: Si habla español, tiene a flynn disposición servicios gratuitos  de asistencia lingüística. Eddi dowd [phone].    We comply with applicable federal civil rights laws and [place] laws. We do not discriminate on the basis of race, color, national origin, age, disability, sex, sexual orientation, or gender identity.            After Visit Summary       This is your record. Keep this with you and show to your community pharmacist(s) and doctor(s) at your next visit.

## 2018-06-21 NOTE — ED AVS SNAPSHOT
[facility] Emergency Department    [address] [address]    Phone:  [phone]    Fax:  [phone]                                       Aman Baker   MRN: [id number]    Department:  [facility] Emergency Department   Date of Visit:  6/21/2018           After Visit Summary Signature Page     I have received my discharge instructions, and my questions have been answered. I have discussed any challenges I see with this plan with the nurse or doctor.    ..........................................................................................................................................  Patient/Patient Representative Signature      ..........................................................................................................................................  Patient Representative Print Name and Relationship to Patient    ..................................................               ................................................  Date                                            Time    ..........................................................................................................................................  Reviewed by Signature/Title    ...................................................              ..............................................  Date                                                            Time

## 2018-06-21 NOTE — ED PROVIDER NOTES
History     Chief Complaint   Patient presents with     Cellulitis     HPI  Aman Baker is a 84 year old male with past medical history which includes type 2 diabetes mellitus, atrial fibrillation, chronic anticoagulation therapy, alpha 1 antitrypsin deficiency, and status post liver transplant >10 years ago who presents for evaluation of lower extremity swelling and left lower externally rash.  Patient explains that over the past 1 month he has gradually developed left lower extremity swelling and skin rash.  He is accompanied by his daughter and both deny history of heart attack, congestive heart failure, lower extremity edema, previous DVT or PE.  Patient maintains at the skin rash of his left lower extremity has been there for several weeks but that it is somewhat tender to the touch.  More recently over the past week or so he has noted some mild swelling of his right lower extremity as well.  Today he informed his daughter of his symptoms and was brought to the department for evaluation.  Patient denies fever, chills, chest pain, cough, shortness of breath, abdominal pain, or genitourinary symptoms.  He is unaware of exacerbating or alleviating factors.    Problem List:    Patient Active Problem List    Diagnosis Date Noted     Gait difficulty 05/08/2018     Priority: Medium     Loss of balance 05/08/2018     Priority: Medium     MGUS (monoclonal gammopathy of unknown significance) 03/23/2018     Priority: Medium     Spondylosis of lumbar region without myelopathy or radiculopathy 03/23/2018     Priority: Medium     Alpha-1-antitrypsin deficiency (H) 03/23/2018     Priority: Medium     Prostate cancer (H) 03/23/2018     Priority: Medium     History of pelvic radiation therapy 42 treatments about 2013       Type 2 diabetes mellitus without complication, without long-term current use of insulin (H) 03/23/2018     Priority: Medium     Age-related osteoporosis with current pathological fracture, sequela  03/23/2018     Priority: Medium     Anemia, unspecified type 03/23/2018     Priority: Medium     Acquired hypothyroidism 03/23/2018     Priority: Medium     Decubitus ulcer of back, stage 1 03/23/2018     Priority: Medium     Primary osteoarthritis of both knees 05/22/2015     Priority: Medium     Chronic atrial fibrillation (H) 11/21/2012     Priority: Medium     Liver replaced by transplant (H) 07/10/2012     Priority: Medium        Past Medical History:    Past Medical History:   Diagnosis Date     Chronic atrial fibrillation (H) 11/21/2012     Prostate cancer (H) 1/2013       Past Surgical History:    Past Surgical History:   Procedure Laterality Date     TRANSPLANT  2004    liver transplant       Family History:    No family history on file.    Social History:  Marital Status:   [2]  Social History   Substance Use Topics     Smoking status: Never Smoker     Smokeless tobacco: Never Used     Alcohol use No        Medications:      apixaban ANTICOAGULANT (ELIQUIS) 2.5 MG tablet   calcitRIOL (ROCALTROL) 0.25 MCG capsule   CELLCEPT (BRAND) 250 MG CAPSULE   Cholecalciferol (VITAMIN D) 1000 UNIT capsule   clindamycin (CLEOCIN) 150 MG capsule   furosemide (LASIX) 20 MG tablet   GABAPENTIN PO   levothyroxine (SYNTHROID, LEVOTHROID) 50 MCG tablet   Magnesium 250 MG tablet   metoprolol tartrate (LOPRESSOR) 100 MG tablet   Multiple Vitamin (DAILY MULTIVITAMIN PO)   predniSONE (DELTASONE) 5 MG tablet   simvastatin (ZOCOR) 20 MG tablet   TRAMADOL HCL PO   ACE/ARB/ARNI NOT PRESCRIBED, INTENTIONAL,   [DISCONTINUED] FUROSEMIDE PO         Review of Systems  Constitutional:  Negative for fever or recent illness.  Cardiovascular: Positive for lower extremity edema.  Negative for chest pain.  Respiratory:  Negative for cough or shortness of breath.  Gastrointestinal:  Negative for abdominal pain, nausea, or vomiting.  Denies blood with bowel movements.  Musculoskeletal:  Negative for recent fall or injury.  Neurological:   Negative for headache.  Skin: Positive for skin rash of left leg.    All others reviewed and are negative.      Physical Exam   BP: 97/59  Pulse: 90  Temp: 98.1  F (36.7  C)  Resp: 20  SpO2: 99 %      Physical Exam  Constitutional:  Well developed, well nourished.  Appears nontoxic and in no acute distress.   HENT:  Normocephalic and atraumatic.  Symmetric in appearance.  Eyes:  Conjunctivae are normal.  Neck:  Neck supple.  Cardiovascular:  No cyanosis.  Irregular regular rhythm.  No audible murmurs noted. 3+ left lower extremity edema without calf tenderness, 1+ edema of right lower extremity.  Respiratory:  Effort normal, no respiratory distress.  CTAB without diminished regions.  No wheezing, rhonchi, or crackles.  Gastrointestinal:  Soft, nondistended abdomen.  Nontender and without guarding.  No rigidity or rebound tenderness.  Negative Castillo's sign.    Genitourinary:  Noncontributory.  Musculoskeletal:  Moves extremities spontaneously.  Neurological:  Patient is alert.  Skin:  Skin is warm and dry.  Warm mildly tender and faintly erythematous rash of left anterior tibial region without abscess formation or discharge.  Psychiatric:  Normal mood and affect.      ED Course     ED Course     Procedures               Critical Care time:  none               Results for orders placed or performed during the hospital encounter of 06/21/18 (from the past 24 hour(s))   CBC with platelets, differential   Result Value Ref Range    WBC 3.6 (L) 4.0 - 11.0 10e9/L    RBC Count 3.72 (L) 4.4 - 5.9 10e12/L    Hemoglobin 10.8 (L) 13.3 - 17.7 g/dL    Hematocrit 35.9 (L) 40.0 - 53.0 %    MCV 97 78 - 100 fl    MCH 29.0 26.5 - 33.0 pg    MCHC 30.1 (L) 31.5 - 36.5 g/dL    RDW 13.9 10.0 - 15.0 %    Platelet Count 223 150 - 450 10e9/L    Diff Method Automated Method     % Neutrophils 72.3 %    % Lymphocytes 9.1 %    % Monocytes 14.6 %    % Eosinophils 2.8 %    % Basophils 0.6 %    % Immature Granulocytes 0.6 %    Nucleated RBCs 0 0  /100    Absolute Neutrophil 2.6 1.6 - 8.3 10e9/L    Absolute Lymphocytes 0.3 (L) 0.8 - 5.3 10e9/L    Absolute Monocytes 0.5 0.0 - 1.3 10e9/L    Absolute Eosinophils 0.1 0.0 - 0.7 10e9/L    Absolute Basophils 0.0 0.0 - 0.2 10e9/L    Abs Immature Granulocytes 0.0 0 - 0.4 10e9/L    Absolute Nucleated RBC 0.0    Comprehensive metabolic panel   Result Value Ref Range    Sodium 141 133 - 144 mmol/L    Potassium 4.2 3.4 - 5.3 mmol/L    Chloride 107 94 - 109 mmol/L    Carbon Dioxide 29 20 - 32 mmol/L    Anion Gap 5 3 - 14 mmol/L    Glucose 78 70 - 99 mg/dL    Urea Nitrogen 13 7 - 30 mg/dL    Creatinine 1.02 0.66 - 1.25 mg/dL    GFR Estimate 70 >60 mL/min/1.7m2    GFR Estimate If Black 84 >60 mL/min/1.7m2    Calcium 7.9 (L) 8.5 - 10.1 mg/dL    Bilirubin Total 0.3 0.2 - 1.3 mg/dL    Albumin 3.4 3.4 - 5.0 g/dL    Protein Total 6.8 6.8 - 8.8 g/dL    Alkaline Phosphatase 69 40 - 150 U/L    ALT 10 0 - 70 U/L    AST 15 0 - 45 U/L   INR   Result Value Ref Range    INR 1.24 (H) 0.86 - 1.14   PTT   Result Value Ref Range    PTT 36 22 - 37 sec   NT pro BNP   Result Value Ref Range    N-Terminal Pro BNP Inpatient 2770 (H) 0 - 1800 pg/mL   US Lower Extremity Venous Duplex Left    Narrative    ULTRASOUND VENOUS LOWER EXTREMITY UNILATERAL LEFT  6/21/2018 7:04 PM     HISTORY: Unilateral edema.      COMPARISON: None.    TECHNIQUE: Ultrasound gray scale, Color Doppler flow, and spectral  Doppler waveform analysis performed.    FINDINGS: The left common femoral, superficial femoral, popliteal and  posterior tibial veins are patent and fully compressible and  demonstrate normal venous Doppler flow. The visualized greater  saphenous vein is negative for thrombus. For comparison the right  common femoral vein was evaluated and was unremarkable.      Impression    IMPRESSION: No DVT demonstrated.     CLARKE SANDOVAL MD       Medications - No data to display    Assessments & Plan (with Medical Decision Making)   Aman Baker is a 84 year old  male who presents to the department with daughter for evaluation of estimated 4 weeks of gradually progressive lower extremity edema and rash of the left leg.  Patient notes that over the past week his right lower extremity has also began slowly developing some fluid or edema.  He told his daughter today of his lower extremity swelling and persistent skin rash so she decided to take him in for evaluation.  He does have a history of liver transplant nearly 13 years ago and continues to take CellCept but denies frequent infections or previous diagnosis of cellulitis.  Differential diagnosis included deep venous thrombosis, formal ultrasound has found no sign of DVT.  CBC reveals mild neutropenia but likely insignificant.  Creatinine and liver enzymes within reference range.  BNP mildly elevated at 2700, without chest pain or shortness of breath, no tachypnea or hypoxia.  Further discussions revealed that the patient suffered a severe traumatic left lower extremity compression injury due to an accident with a horse around the age of 20.  He has not had lower extremity edema in the past but could be contributing to his left greater than right lower extremity edema.  I had a long discussion with the patient and his daughter about the recommendation for admission to the hospital for IV antibiotics and further diagnostic workup, however the patient maintains that he would be much more comfortable at home.  He understands that he has signs of early skin infection that may be slow to treat with oral antibiotics due to his past medical history and peripheral edema.  Aman is oriented, appears to have decision making capacity, and has verbalized an understanding risks of discharge home as well as strict return instructions for symptoms such as fever, progressive lower extremity swelling, increasing size of rash, fever, difficulty breathing, safety or any other concerns.  He and his daughter seem comfortable with his decision  to return home and both will monitor closely.  He will be prescribed oral antibiotic Keflex as well as short course of furosemide for diuresis.  Marker used to draw a border around his skin rash for ease of monitoring.  Clinic follow-up appointment was scheduled for him after the weekend on Monday afternoon.      Disclaimer:  This note consists of symbols derived from keyboarding, dictation, and/or voice recognition software.  As a result, there may be errors in the script that have gone undetected.  Please consider this when interpreting information found in the chart.        I have reviewed the nursing notes.    I have reviewed the findings, diagnosis, plan and need for follow up with the patient.       New Prescriptions    CLINDAMYCIN (CLEOCIN) 150 MG CAPSULE    Take 3 capsules (450 mg) by mouth 3 times daily for 10 days    FUROSEMIDE (LASIX) 20 MG TABLET    Take 1 tablet (20 mg) by mouth daily       Final diagnoses:   Lower extremity edema   Cellulitis of left lower extremity       6/21/2018   [facility] EMERGENCY DEPARTMENT     Pankaj Donato DO  06/21/18 2019

## 2018-06-22 NOTE — PROGRESS NOTES
SUBJECTIVE:   Aman Baker is a 84 year old male who presents to clinic today for the following health issues:    ED/UC Followup:  Facility: [facility]  Date of visit: 06/21/2018  Reason for visit: Cellulitis   Current Status: Same     Patient is a 85 yo male here with his son for follow up after treatment for cellulitis in the LLE; om Clindamycin 450 mg TID. The son reports that the brown discoloration on the leg is getting better with a clear red discoloration though has extended beyond the marked margins. There is not much pain and has feeling in affected area.  He is having a hard time elevating the leg because of back pain,    Weight Loss:   He has lost some significant weight loss. Appetite not been great. Wife in memory care, now lives alone, son checks on him regularly.  Wt Readings from Last 4 Encounters:   06/25/18 113 lb (51.3 kg)   05/01/18 123 lb 8 oz (56 kg)   04/27/18 126 lb (57.2 kg)   03/23/18 126 lb (57.2 kg)     DM2:   Diet controlled   Does not check blood sugars    Problem list and histories reviewed & adjusted, as indicated.  Additional history: as documented    Patient Active Problem List   Diagnosis     Liver replaced by transplant (H)     Chronic atrial fibrillation (H)     Primary osteoarthritis of both knees     MGUS (monoclonal gammopathy of unknown significance)     Spondylosis of lumbar region without myelopathy or radiculopathy     Alpha-1-antitrypsin deficiency (H)     Prostate cancer (H)     Type 2 diabetes mellitus without complication, without long-term current use of insulin (H)     Age-related osteoporosis with current pathological fracture, sequela     Anemia, unspecified type     Acquired hypothyroidism     Decubitus ulcer of back, stage 1     Gait difficulty     Loss of balance     Past Surgical History:   Procedure Laterality Date     TRANSPLANT  2004    liver transplant       Social History   Substance Use Topics     Smoking status: Never Smoker     Smokeless  "tobacco: Never Used     Alcohol use No     History reviewed. No pertinent family history.      Reviewed and updated as needed this visit by clinical staff  Tobacco  Allergies  Meds  Med Hx  Surg Hx  Fam Hx  Soc Hx      ROS:  Constitutional, HEENT, cardiovascular, pulmonary, gi and gu systems are negative, except as otherwise noted.    OBJECTIVE:     /54 (BP Location: Left arm, Patient Position: Chair, Cuff Size: Adult Regular)  Pulse 105  Temp 97  F (36.1  C) (Oral)  Resp 14  Ht 5' 2\" (1.575 m)  Wt 113 lb (51.3 kg)  SpO2 99%  BMI 20.67 kg/m2  Body mass index is 20.67 kg/(m^2).  GENERAL: healthy, alert and no distress  NECK: no adenopathy and thyroid normal to palpation  RESP: lungs clear to auscultation - no rales, rhonchi or wheezes  CV: regular rate and rhythm, normal S1 S2, no S3 or S4, no murmur, click or rub, no peripheral edema and peripheral pulses strong.  MS: Erythema LLE, clear redness extended beyond marks, no tenderness    Diagnostic Test Results:  none     ASSESSMENT/PLAN:     (Z09) Follow up: ER treatment  (primary encounter diagnosis)  Comment: Cellulitis improving  Plan: Antibiotic    (L03.116) Cellulitis of left lower extremity  Comment: Patient and son report that legs looks better, color has improved though redness extended beyond marking form ER. Not been elevating well. As a result discussed continuing antibiotic and elevate leg as much as can. Return right away if pain or swelling increases otherwise follow up on completing antibiotic    (R63.4) Weight loss  Comment: Could be dietary. Family will ensure has adequate nutrition    (E11.9) Type 2 diabetes mellitus without complication, without long-term current use of insulin (H)  Comment: Diet controlled    Follow up in 1 week or sooner with concerns    Juan Fowler MD  [facility] [place]  "

## 2018-06-25 ENCOUNTER — OFFICE VISIT (OUTPATIENT)
Dept: FAMILY MEDICINE | Facility: CLINIC | Age: 83
End: 2018-06-25
Payer: COMMERCIAL

## 2018-06-25 VITALS
RESPIRATION RATE: 14 BRPM | HEIGHT: 62 IN | WEIGHT: 113 LBS | DIASTOLIC BLOOD PRESSURE: 54 MMHG | HEART RATE: 105 BPM | SYSTOLIC BLOOD PRESSURE: 104 MMHG | OXYGEN SATURATION: 99 % | BODY MASS INDEX: 20.8 KG/M2 | TEMPERATURE: 97 F

## 2018-06-25 DIAGNOSIS — L03.116 CELLULITIS OF LEFT LOWER EXTREMITY: ICD-10-CM

## 2018-06-25 DIAGNOSIS — E11.9 TYPE 2 DIABETES MELLITUS WITHOUT COMPLICATION, WITHOUT LONG-TERM CURRENT USE OF INSULIN (H): ICD-10-CM

## 2018-06-25 DIAGNOSIS — R63.4 WEIGHT LOSS: ICD-10-CM

## 2018-06-25 DIAGNOSIS — Z09 FOLLOW UP: Primary | ICD-10-CM

## 2018-06-25 PROCEDURE — 99213 OFFICE O/P EST LOW 20 MIN: CPT | Performed by: FAMILY MEDICINE

## 2018-06-25 NOTE — PATIENT INSTRUCTIONS
[facility]    If you have any questions regarding to your visit please contact your care team:       Team Purple:   Clinic Hours Telephone Number   Dr. Abeba Lopez   7am-7pm  Monday - Thursday   7am-5pm  Fridays  [phone]  (Appointment scheduling available 24/7)    Questions about your recent visit?   Team Line:  [phone]   Urgent Care - [place] and [facility] - 11am-9pm Monday-Friday Saturday-Sunday- 9am-5pm   [place] - 5pm-9pm Monday-Friday Saturday-Sunday- 9am-5pm  [phone] - [place]  [phone] [facility]       What options do I have for a visit other than an office visit? We offer electronic visits (e-visits) and telephone visits, when medically appropriate.  Please check with your medical insurance to see if these types of visits are covered, as you will be responsible for any charges that are not paid by your insurance.      You can use Refund Exchange (secure electronic communication) to access to your chart, send your primary care provider a message, or make an appointment. Ask a team member how to get started.     For a price quote for your services, please call our Consumer Price Line at [phone] or our Imaging Cost estimation line at [phone] (for imaging tests).    Pankaj Kaufman    Cellulitis  Cellulitis is an infection of the deep layers of skin. A break in the skin, such as a cut or scratch, can let bacteria under the skin. If the bacteria get to deep layers of the skin, it can be serious. If not treated, cellulitis can get into the bloodstream and lymph nodes. The infection can then spread throughout the body. This causes serious illness.  Cellulitis causes the affected skin to become red, swollen, warm, and sore. The reddened areas have a visible border. An open sore may leak fluid (pus). You may have a fever, chills, and pain.  Cellulitis is treated with antibiotics taken for 7 to 10 days. An  open sore may be cleaned and covered with cool wet gauze. Symptoms should get better 1 to 2 days after treatment is started. Make sure to take all the antibiotics for the full number of days until they are gone. Keep taking the medicine even if your symptoms go away.  Home care  Follow these tips:    Limit the use of the part of your body with cellulitis.     If the infection is on your leg, keep your leg raised while sitting. This will help to reduce swelling.    Take all of the antibiotic medicine exactly as directed until it is gone. Do not miss any doses, especially during the first 7 days. Don t stop taking the medicine when your symptoms get better.    Keep the affected area clean and dry.    Wash your hands with soap and warm water before and after touching your skin. Anyone else who touches your skin should also wash his or her hands. Don't share towels.  Follow-up care  Follow up with your healthcare provider, or as advised. If your infection does not go away on the first antibiotic, your healthcare provider will prescribe a different one.  When to seek medical advice  Call your healthcare provider right away if any of these occur:    Red areas that spread    Swelling or pain that gets worse    Fluid leaking from the skin (pus)    Fever higher of 100.4  F (38.0  C) or higher after 2 days on antibiotics  Date Last Reviewed: 9/1/2016 2000-2017 [employer]. [address] [place] [address], [place] [address]. All rights reserved. This information is not intended as a substitute for professional medical care. Always follow your healthcare professional's instructions.

## 2018-06-25 NOTE — MR AVS SNAPSHOT
After Visit Summary   6/25/2018    Aman Baker    MRN: [id number]           Patient Information     Date Of Birth          5/6/1934        Visit Information        Provider Department      6/25/2018 3:40 PM Juan Fowelr MD [facility]        Today's Diagnoses     Follow up: ER treatment    -  1    Cellulitis of left lower extremity        Type 2 diabetes mellitus without complication, without long-term current use of insulin (H)          Care Instructions    [facility]    If you have any questions regarding to your visit please contact your care team:       Team Purple:   Clinic Hours Telephone Number   Dr. Abeba Lopez   7am-7pm  Monday - Thursday   7am-5pm  Fridays  [phone]  (Appointment scheduling available 24/7)    Questions about your recent visit?   Team Line:  [phone]   Urgent Care - [place] and [facility] - 11am-9pm Monday-Friday Saturday-Sunday- 9am-5pm   [place] - 5pm-9pm Monday-Friday Saturday-Sunday- 9am-5pm  [phone] - [place]  [phone] - [place]       What options do I have for a visit other than an office visit? We offer electronic visits (e-visits) and telephone visits, when medically appropriate.  Please check with your medical insurance to see if these types of visits are covered, as you will be responsible for any charges that are not paid by your insurance.      You can use [employer] (secure electronic communication) to access to your chart, send your primary care provider a message, or make an appointment. Ask a team member how to get started.     For a price quote for your services, please call our Consumer Price Line at [phone] or our Imaging Cost estimation line at [phone] (for imaging tests).    Pankaj Kaufman    Cellulitis  Cellulitis is an infection of the deep layers of skin. A break in the skin, such as a cut or scratch, can let  bacteria under the skin. If the bacteria get to deep layers of the skin, it can be serious. If not treated, cellulitis can get into the bloodstream and lymph nodes. The infection can then spread throughout the body. This causes serious illness.  Cellulitis causes the affected skin to become red, swollen, warm, and sore. The reddened areas have a visible border. An open sore may leak fluid (pus). You may have a fever, chills, and pain.  Cellulitis is treated with antibiotics taken for 7 to 10 days. An open sore may be cleaned and covered with cool wet gauze. Symptoms should get better 1 to 2 days after treatment is started. Make sure to take all the antibiotics for the full number of days until they are gone. Keep taking the medicine even if your symptoms go away.  Home care  Follow these tips:    Limit the use of the part of your body with cellulitis.     If the infection is on your leg, keep your leg raised while sitting. This will help to reduce swelling.    Take all of the antibiotic medicine exactly as directed until it is gone. Do not miss any doses, especially during the first 7 days. Don t stop taking the medicine when your symptoms get better.    Keep the affected area clean and dry.    Wash your hands with soap and warm water before and after touching your skin. Anyone else who touches your skin should also wash his or her hands. Don't share towels.  Follow-up care  Follow up with your healthcare provider, or as advised. If your infection does not go away on the first antibiotic, your healthcare provider will prescribe a different one.  When to seek medical advice  Call your healthcare provider right away if any of these occur:    Red areas that spread    Swelling or pain that gets worse    Fluid leaking from the skin (pus)    Fever higher of 100.4  F (38.0  C) or higher after 2 days on antibiotics  Date Last Reviewed: 9/1/2016 2000-2017 [employer]. [address] [facility], [place], [place]  "[address]. All rights reserved. This information is not intended as a substitute for professional medical care. Always follow your healthcare professional's instructions.                Follow-ups after your visit        Who to contact     If you have questions or need follow up information about today's clinic visit or your schedule please contact [facility] directly at [phone].  Normal or non-critical lab and imaging results will be communicated to you by MyChart, letter or phone within 4 business days after the clinic has received the results. If you do not hear from us within 7 days, please contact the clinic through Syn[employer] or phone. If you have a critical or abnormal lab result, we will notify you by phone as soon as possible.  Submit refill requests through [employer] or call your pharmacy and they will forward the refill request to us. Please allow 3 business days for your refill to be completed.          Additional Information About Your Visit        [employer]hart Information     [employer] gives you secure access to your electronic health record. If you see a primary care provider, you can also send messages to your care team and make appointments. If you have questions, please call your primary care clinic.  If you do not have a primary care provider, please call [phone] and they will assist you.        Care EveryWhere ID     This is your Care EveryWhere ID. This could be used by other organizations to access your [place] medical records  [id number]        Your Vitals Were     Pulse Temperature Respirations Height Pulse Oximetry BMI (Body Mass Index)    105 97  F (36.1  C) (Oral) 14 5' 2\" (1.575 m) 99% 20.67 kg/m2       Blood Pressure from Last 3 Encounters:   06/25/18 104/54   06/21/18 105/64   05/01/18 110/80    Weight from Last 3 Encounters:   06/25/18 113 lb (51.3 kg)   05/01/18 123 lb 8 oz (56 kg)   04/27/18 126 lb (57.2 kg)              Today, you had the following     No orders found for " display       Primary Care Provider Office Phone # Fax #    [facility] [phone] [address] [facility] [address]        Equal Access to Services     KOKO CHOI : Hadii aad ku hadgranto Soomaali, waaxda luqadaha, qaybta kaalmada adeegyada, charlette yeagern burke ryanrowena desai. So [facility] [phone].    ATENCIÓN: Si habla español, tiene a flynn disposición servicios gratuitos de asistencia lingüística. Llame al [phone].    We comply with applicable federal civil rights laws and [place] laws. We do not discriminate on the basis of race, color, national origin, age, disability, sex, sexual orientation, or gender identity.            Thank you!     Thank you for choosing [facility]  for your care. Our goal is always to provide you with excellent care. Hearing back from our patients is one way we can continue to improve our services. Please take a few minutes to complete the written survey that you may receive in the mail after your visit with us. Thank you!             Your Updated Medication List - Protect others around you: Learn how to safely use, store and throw away your medicines at www.disposemymeds.org.          This list is accurate as of 6/25/18  4:30 PM.  Always use your most recent med list.                   Brand Name Dispense Instructions for use Diagnosis    ACE/ARB/ARNI NOT PRESCRIBED (INTENTIONAL)      Please choose reason not prescribed, below    Type 2 diabetes mellitus without complication, without long-term current use of insulin (H)       apixaban ANTICOAGULANT 2.5 MG tablet    ELIQUIS    180 tablet    Take 1 tablet (2.5 mg) by mouth 2 times daily    Chronic atrial fibrillation (H)       calcitRIOL 0.25 MCG capsule    ROCALTROL     Take 1 capsule by mouth daily.        clindamycin 150 MG capsule    CLEOCIN    90 capsule    Take 3 capsules (450 mg) by mouth 3 times daily for 10 days        DAILY MULTIVITAMIN PO      Take 1 tablet  daily        furosemide 20 MG tablet    LASIX    30 tablet    Take 1 tablet (20 mg) by mouth daily        GABAPENTIN PO      Take 300 mg by mouth 3 times daily        levothyroxine 50 MCG tablet    SYNTHROID/LEVOTHROID     Take 1 tablet by mouth daily.        magnesium 250 MG tablet      Take 1 tablet by mouth daily.        metoprolol tartrate 100 MG tablet    LOPRESSOR    180 tablet    Take 1 tablet (100 mg) by mouth 2 times daily    Chronic atrial fibrillation (H)       mycophenolate 250 MG capsule     540 capsule    Take 3 capsules (750 mg) by mouth 2 times daily    Liver replaced by transplant (H)       predniSONE 5 MG tablet    DELTASONE    90 tablet    TAKE ONE TABLET BY MOUTH EVERY DAY    Liver replaced by transplant (H)       simvastatin 20 MG tablet    ZOCOR    90 tablet    TAKE 1 TABLET (20 MG) BY MOUTH AT BEDTIME    Liver replaced by transplant (H), Lipids abnormal       TRAMADOL HCL PO      Take 50 mg by mouth 2 times daily        vitamin D 1000 units capsule      Take 1 capsule by mouth 2 times daily.

## 2018-06-25 NOTE — NURSING NOTE
"Chief Complaint   Patient presents with     ER F/U     [facility] Maintenance     PHQ 2      Initial /54 (BP Location: Left arm, Patient Position: Chair, Cuff Size: Adult Regular)  Pulse 105  Temp 97  F (36.1  C) (Oral)  Resp 14  Ht 5' 2\" (1.575 m)  Wt 113 lb (51.3 kg)  SpO2 99%  BMI 20.67 kg/m2 Estimated body mass index is 20.67 kg/(m^2) as calculated from the following:    Height as of this encounter: 5' 2\" (1.575 m).    Weight as of this encounter: 113 lb (51.3 kg).  BP completed using cuff size: regular    Pankaj Kaufman  "

## 2018-07-05 ENCOUNTER — TELEPHONE (OUTPATIENT)
Dept: AUDIOLOGY | Facility: CLINIC | Age: 83
End: 2018-07-05

## 2018-07-05 ENCOUNTER — OFFICE VISIT (OUTPATIENT)
Dept: FAMILY MEDICINE | Facility: CLINIC | Age: 83
End: 2018-07-05
Payer: COMMERCIAL

## 2018-07-05 VITALS
DIASTOLIC BLOOD PRESSURE: 60 MMHG | BODY MASS INDEX: 21.9 KG/M2 | HEART RATE: 109 BPM | TEMPERATURE: 99.7 F | RESPIRATION RATE: 15 BRPM | HEIGHT: 62 IN | SYSTOLIC BLOOD PRESSURE: 98 MMHG | WEIGHT: 119 LBS | OXYGEN SATURATION: 99 %

## 2018-07-05 DIAGNOSIS — M47.816 SPONDYLOSIS OF LUMBAR REGION WITHOUT MYELOPATHY OR RADICULOPATHY: ICD-10-CM

## 2018-07-05 DIAGNOSIS — L03.116 CELLULITIS OF LEFT LOWER EXTREMITY: Primary | ICD-10-CM

## 2018-07-05 DIAGNOSIS — G31.84 MILD COGNITIVE IMPAIRMENT: ICD-10-CM

## 2018-07-05 DIAGNOSIS — Z94.4 LIVER REPLACED BY TRANSPLANT (H): ICD-10-CM

## 2018-07-05 DIAGNOSIS — M80.00XS AGE-RELATED OSTEOPOROSIS WITH CURRENT PATHOLOGICAL FRACTURE, SEQUELA: ICD-10-CM

## 2018-07-05 PROCEDURE — 99214 OFFICE O/P EST MOD 30 MIN: CPT | Performed by: INTERNAL MEDICINE

## 2018-07-05 RX ORDER — TRAMADOL HYDROCHLORIDE 50 MG/1
50 TABLET ORAL 2 TIMES DAILY PRN
Qty: 60 TABLET | Refills: 3 | Status: SHIPPED | OUTPATIENT
Start: 2018-07-05 | End: 2018-01-01

## 2018-07-05 NOTE — Clinical Note
I would like to chat with the daughter that was at this office visit today. It's not an emergency but if we could get this telephone call to happen tomorrow sometime that would be good. There's a few issues to review

## 2018-07-05 NOTE — NURSING NOTE
Six Item Cognitive Impairment Test   (6CIT):      What year is it?                               Correct - 0 points    What month is it?                               Correct - 0 points      Give the patient an address to remember with five components:   [place] ( first and last name - 2 components)   [address]  (number and name of street - 2 components)   [place] ( city - 1 component)      About what time is it (within the hour)? Correct - 0 points    Count backwards from 20 to 1:   Correct - 0 points    Say the months of the year in reverse: More than one error - 4 points    Repeat the address phrase:   All wrong - 10 points    Total 6CIT Score:      14/28    Interpretation: The 6CIT uses an inverse score and questions are weighted to produce a total out of 28. Scores of 0-7 are considered normal and 8 or more significant.    Advantages The test has high sensitivity without compromising specificity even in mild dementia. It is easy to translate linguistically and culturally.  Disadvantages The main disadvantage is in the scoring and weighting of the test, which is initially confusing, however computer models have simplified this greatly.    Probability Statistics: At the 7/8 cut off: Overall figures sensitivity 90% specificity 100%, in mild dementia sensitivity = 78% , specificity = 100%    Copyright 2000 [facility], [facility], [place]. Courtesy of Dr. Jonn Ball

## 2018-07-05 NOTE — MR AVS SNAPSHOT
After Visit Summary   7/5/2018    Aman Baker    MRN: [id number]           Patient Information     Date Of Birth          5/6/1934        Visit Information        Provider Department      7/5/2018 3:10 PM Jluis Marroquin MD [facility]        Today's Diagnoses     Cellulitis of left lower extremity    -  1    Age-related osteoporosis with current pathological fracture, sequela        Liver replaced by transplant (H)        Spondylosis of lumbar region without myelopathy or radiculopathy          Care Instructions    [facility]    If you have any questions regarding to your visit please contact your care team:     Team Pink:   Clinic Hours Telephone Number   Internal Medicine:  Dr. Diana Nguyen NP       7am-7pm  Monday - Thursday   7am-5pm  Fridays  [phone]  (Appointment scheduling available 24/7)    Questions about your recent visit?  Team Line  [phone]   Urgent Care - [place] and [facility] - 11am-9pm Monday-Friday Saturday-Sunday- 9am-5pm   [place] - 5pm-9pm Monday-Friday Saturday-Sunday- 9am-5pm  [phone] - [place]  [phone] - [place]       What options do I have for a visit other than an office visit? We offer electronic visits (e-visits) and telephone visits, when medically appropriate.  Please check with your medical insurance to see if these types of visits are covered, as you will be responsible for any charges that are not paid by your insurance.      You can use Crunched (secure electronic communication) to access to your chart, send your primary care provider a message, or make an appointment. Ask a team member how to get started.     For a price quote for your services, please call our Consumer Price Line at [phone] or our Imaging Cost estimation line at [phone] (for imaging tests).  [facility]    If you have any questions regarding to your visit please  contact your care team:     Team Pink:   Clinic Hours Telephone Number   Internal Medicine:  Dr. Diana Nguyen NP       7am-7pm  Monday - Thursday   7am-5pm  Fridays  [phone]  (Appointment scheduling available 24/7)    Questions about your recent visit?  Team Line  [phone]   Urgent Care - [place] and [facility] - 11am-9pm Monday-Friday Saturday-Sunday- 9am-5pm   [place] - 5pm-9pm Monday-Friday Saturday-Sunday- 9am-5pm  [phone] - [place]  [phone] - [place]       What options do I have for a visit other than an office visit? We offer electronic visits (e-visits) and telephone visits, when medically appropriate.  Please check with your medical insurance to see if these types of visits are covered, as you will be responsible for any charges that are not paid by your insurance.      You can use LiB (secure electronic communication) to access to your chart, send your primary care provider a message, or make an appointment. Ask a team member how to get started.     For a price quote for your services, please call our Consumer Price Line at [phone] or our Imaging Cost estimation line at [phone] (for imaging tests).      WALT Watson            Follow-ups after your visit        Who to contact     If you have questions or need follow up information about today's clinic visit or your schedule please contact [facility] directly at [phone].  Normal or non-critical lab and imaging results will be communicated to you by Voxahart, letter or phone within 4 business days after the clinic has received the results. If you do not hear from us within 7 days, please contact the clinic through [employer] or phone. If you have a critical or abnormal lab result, we will notify you by phone as soon as possible.  Submit refill requests through LiB or call your pharmacy and they will forward the refill request to us. Please  "allow 3 business days for your refill to be completed.          Additional Information About Your Visit        [employer] System[employer] Information     [employer] gives you secure access to your electronic health record. If you see a primary care provider, you can also send messages to your care team and make appointments. If you have questions, please call your primary care clinic.  If you do not have a primary care provider, please call [phone] and they will assist you.        Care EveryWhere ID     This is your Care EveryWhere ID. This could be used by other organizations to access your [place] medical records  [id number]        Your Vitals Were     Pulse Temperature Respirations Height Pulse Oximetry BMI (Body Mass Index)    109 99.7  F (37.6  C) 15 5' 2\" (1.575 m) 99% 21.77 kg/m2       Blood Pressure from Last 3 Encounters:   07/05/18 98/60   06/25/18 104/54   06/21/18 105/64    Weight from Last 3 Encounters:   07/05/18 119 lb (54 kg)   06/25/18 113 lb (51.3 kg)   05/01/18 123 lb 8 oz (56 kg)              Today, you had the following     No orders found for display         Today's Medication Changes          These changes are accurate as of 7/5/18  3:20 PM.  If you have any questions, ask your nurse or doctor.               These medicines have changed or have updated prescriptions.        Dose/Directions    traMADol 50 MG tablet   Commonly known as:  ULTRAM   This may have changed:    - when to take this  - reasons to take this   Used for:  Spondylosis of lumbar region without myelopathy or radiculopathy   Changed by:  Jluis Marroquin MD        Dose:  50 mg   Take 1 tablet (50 mg) by mouth 2 times daily as needed   Quantity:  60 tablet   Refills:  3            Where to get your medicines      Some of these will need a paper prescription and others can be bought over the counter.  Ask your nurse if you have questions.     Bring a paper prescription for each of these medications     traMADol 50 MG tablet             "   Information about OPIOIDS     PRESCRIPTION OPIOIDS: WHAT YOU NEED TO KNOW   We gave you an opioid (narcotic) pain medicine. It is important to manage your pain, but opioids are not always the best choice. You should first try all the other options your care team gave you. Take this medicine for as short a time (and as few doses) as possible.     These medicines have risks:    DO NOT drive when on new or higher doses of pain medicine. These medicines can affect your alertness and reaction times, and you could be arrested for driving under the influence (DUI). If you need to use opioids long-term, talk to your care team about driving.    DO NOT operate heave machinery    DO NOT do any other dangerous activities while taking these medicines.     DO NOT drink any alcohol while taking these medicines.      If the opioid prescribed includes acetaminophen, DO NOT take with any other medicines that contain acetaminophen. Read all labels carefully. Look for the word  acetaminophen  or  Tylenol.  Ask your pharmacist if you have questions or are unsure.    You can get addicted to pain medicines, especially if you have a history of addiction (chemical, alcohol or substance dependence). Talk to your care team about ways to reduce this risk.    Store your pills in a secure place, locked if possible. We will not replace any lost or stolen medicine. If you don t finish your medicine, please throw away (dispose) as directed by your pharmacist. The [place] Pollution Control Agency has more information about safe disposal: https://www.pca.[facility].mn.us/living-green/managing-unwanted-medications.     All opioids tend to cause constipation. Drink plenty of water and eat foods that have a lot of fiber, such as fruits, vegetables, prune juice, apple juice and high-fiber cereal. Take a laxative (Miralax, milk of magnesia, Colace, Senna) if you don t move your bowels at least every other day.          Primary Care Provider Office Phone #  Fax #    [facility] [phone] [address] [facility] [address]        Equal Access to Services     KOKO CHOI : Hadii aad ku hadgranto Sokatelynnali, waaxda luqadaha, qaybta kaalmada adecarolyn, charlette moya laTabatharowena desai. So [facility] [phone].    ATENCIÓN: Si habla español, tiene a flynn disposición servicios gratuitos de asistencia lingüística. Llame al [phone].    We comply with applicable federal civil rights laws and [place] laws. We do not discriminate on the basis of race, color, national origin, age, disability, sex, sexual orientation, or gender identity.            Thank you!     Thank you for choosing [facility]  for your care. Our goal is always to provide you with excellent care. Hearing back from our patients is one way we can continue to improve our services. Please take a few minutes to complete the written survey that you may receive in the mail after your visit with us. Thank you!             Your Updated Medication List - Protect others around you: Learn how to safely use, store and throw away your medicines at www.disposemymeds.org.          This list is accurate as of 7/5/18  3:20 PM.  Always use your most recent med list.                   Brand Name Dispense Instructions for use Diagnosis    ACE/ARB/ARNI NOT PRESCRIBED (INTENTIONAL)      Please choose reason not prescribed, below    Type 2 diabetes mellitus without complication, without long-term current use of insulin (H)       apixaban ANTICOAGULANT 2.5 MG tablet    ELIQUIS    180 tablet    Take 1 tablet (2.5 mg) by mouth 2 times daily    Chronic atrial fibrillation (H)       calcitRIOL 0.25 MCG capsule    ROCALTROL     Take 1 capsule by mouth daily.        DAILY MULTIVITAMIN PO      Take 1 tablet daily        furosemide 20 MG tablet    LASIX    30 tablet    Take 1 tablet (20 mg) by mouth daily        GABAPENTIN PO      Take 300 mg by mouth 3 times daily         levothyroxine 50 MCG tablet    SYNTHROID/LEVOTHROID     Take 1 tablet by mouth daily.        magnesium 250 MG tablet      Take 1 tablet by mouth daily.        metoprolol tartrate 100 MG tablet    LOPRESSOR    180 tablet    Take 1 tablet (100 mg) by mouth 2 times daily    Chronic atrial fibrillation (H)       mycophenolate 250 MG capsule     540 capsule    Take 3 capsules (750 mg) by mouth 2 times daily    Liver replaced by transplant (H)       predniSONE 5 MG tablet    DELTASONE    90 tablet    TAKE ONE TABLET BY MOUTH EVERY DAY    Liver replaced by transplant (H)       simvastatin 20 MG tablet    ZOCOR    90 tablet    TAKE 1 TABLET (20 MG) BY MOUTH AT BEDTIME    Liver replaced by transplant (H), Lipids abnormal       traMADol 50 MG tablet    ULTRAM    60 tablet    Take 1 tablet (50 mg) by mouth 2 times daily as needed    Spondylosis of lumbar region without myelopathy or radiculopathy       vitamin D 1000 units capsule      Take 1 capsule by mouth 2 times daily.

## 2018-07-05 NOTE — PATIENT INSTRUCTIONS
[facility] (formerly [facility])    If you have any questions regarding to your visit please contact your care team:     Team Pink:   Clinic Hours Telephone Number   Internal Medicine:  Dr. Diana Nguyen NP       7am-7pm  Monday - Thursday   7am-5pm  Fridays  [phone]  (Appointment scheduling available 24/7)    Questions about your recent visit?  Team Line  [phone]   Urgent Care - [place] - 11am-9pm Monday-Friday Saturday-Sunday- 9am-5pm   [place] - 5pm-9pm Monday-Friday Saturday-Sunday- 9am-5pm  [phone] - Catherine Rolle  [phone] - [place]       What options do I have for a visit other than an office visit? We offer electronic visits (e-visits) and telephone visits, when medically appropriate.  Please check with your medical insurance to see if these types of visits are covered, as you will be responsible for any charges that are not paid by your insurance.      You can use OneAway (secure electronic communication) to access to your chart, send your primary care provider a message, or make an appointment. Ask a team member how to get started.     For a price quote for your services, please call our Consumer Price Line at [phone] or our Imaging Cost estimation line at [phone] (for imaging tests).  [facility] ([facility])    If you have any questions regarding to your visit please contact your care team:     Team Pink:   Clinic Hours Telephone Number   Internal Medicine:  Dr. Diana Nguyen NP       7am-7pm  Monday - Thursday   7am-5pm  Fridays  [phone]  (Appointment scheduling available 24/7)    Questions about your recent visit?  Team Line  [phone]   Urgent Care - [place] - 11am-9pm Monday-Friday Saturday-Sunday- 9am-5pm   [place] - 5pm-9pm Monday-Friday Saturday-Sunday- 9am-5pm  [phone] - Catherine Rolle  [phone] - [facility]  options do I have for a visit other than an office visit? We offer electronic visits (e-visits) and telephone visits, when medically appropriate.  Please check with your medical insurance to see if these types of visits are covered, as you will be responsible for any charges that are not paid by your insurance.      You can use [employer] Systems (secure electronic communication) to access to your chart, send your primary care provider a message, or make an appointment. Ask a team member how to get started.     For a price quote for your services, please call our Consumer Price Line at [phone] or our Imaging Cost estimation line at [phone] (for imaging tests).      WALT Watson

## 2018-07-05 NOTE — TELEPHONE ENCOUNTER
Called and left a message for patient daughter to return call to schedule a 30Min hearing aid  with Dr. Mccracken. Please help schedule appt. When she calls back.    Rachna Her  Patient Representative   [facility]

## 2018-07-05 NOTE — PROGRESS NOTES
SUBJECTIVE:   Aman Baker is a 84 year old male who presents to clinic today for the following health issues:    Today is a post emergency room evaluation follow up office visit .    ED/UC Followup:    Facility:  [place]   Date of visit: 6/21/18  Reason for visit: swollen left leg  Current Status: still redness and swollen     At the ER he was presumed to have a bacterial cellulitic infection in the left lower leg. This has been going on for him for over a month prior to going in to the ER.  He is still having pain, redness, and swelling in the left lower leg. He reports never having anything like this in the past. We are to recheck / reassess his left lower extremity .    He does have some neuropathy, but they think this is from his transplant, not from diabetes. His blood sugar numbers are normal as well as normal hemoglobin a1c  [ diabetes test ] and on no anti-diabetic medications. He has a liver transplant and is on immunosuppressant.   He is on Eliquis. He is getting a series of Prolia shots and had his first in May, is wanting to get the next one done at this clinic in November. He also needs refills of his tramadol.       Memory Concerns  His daughter is concerned today about the patient's memory. She is wanting to have some testing done for him to determine the extent of his memory issues. On this point, patients daughter pulled me aside and we had a separate discussion in a different office visit room. He has what sounds like mild cognitive impairment but we agree more testing is appropriate.Will conduct a 6 CIT dementia screen  today and plan to follow up in the future to discuss and make a plan for further testing.    Problem list and histories reviewed & adjusted, as indicated.  Additional history: as documented    Patient Active Problem List   Diagnosis     Liver replaced by transplant (H)     Chronic atrial fibrillation (H)     Primary osteoarthritis of both knees     MGUS (monoclonal  gammopathy of unknown significance)     Spondylosis of lumbar region without myelopathy or radiculopathy     Alpha-1-antitrypsin deficiency (H)     Prostate cancer (H)     Type 2 diabetes mellitus without complication, without long-term current use of insulin (H)     Age-related osteoporosis with current pathological fracture, sequela     Anemia, unspecified type     Acquired hypothyroidism     Decubitus ulcer of back, stage 1     Gait difficulty     Loss of balance     Past Surgical History:   Procedure Laterality Date     TRANSPLANT  2004    liver transplant       Social History   Substance Use Topics     Smoking status: Never Smoker     Smokeless tobacco: Never Used     Alcohol use No     No family history on file.      Current Outpatient Prescriptions   Medication Sig Dispense Refill     ACE/ARB/ARNI NOT PRESCRIBED, INTENTIONAL, Please choose reason not prescribed, below       apixaban ANTICOAGULANT (ELIQUIS) 2.5 MG tablet Take 1 tablet (2.5 mg) by mouth 2 times daily 180 tablet 1     calcitRIOL (ROCALTROL) 0.25 MCG capsule Take 1 capsule by mouth daily.       CELLCEPT (BRAND) 250 MG CAPSULE Take 3 capsules (750 mg) by mouth 2 times daily 540 capsule 3     Cholecalciferol (VITAMIN D) 1000 UNIT capsule Take 1 capsule by mouth 2 times daily.       furosemide (LASIX) 20 MG tablet Take 1 tablet (20 mg) by mouth daily 30 tablet 0     GABAPENTIN PO Take 300 mg by mouth 3 times daily       levothyroxine (SYNTHROID, LEVOTHROID) 50 MCG tablet Take 1 tablet by mouth daily.       Magnesium 250 MG tablet Take 1 tablet by mouth daily.       metoprolol tartrate (LOPRESSOR) 100 MG tablet Take 1 tablet (100 mg) by mouth 2 times daily 180 tablet 3     predniSONE (DELTASONE) 5 MG tablet TAKE ONE TABLET BY MOUTH EVERY DAY 90 tablet 3     simvastatin (ZOCOR) 20 MG tablet TAKE 1 TABLET (20 MG) BY MOUTH AT BEDTIME 90 tablet 2     TRAMADOL HCL PO Take 50 mg by mouth 2 times daily       Multiple Vitamin (DAILY MULTIVITAMIN PO) Take 1  "tablet daily       BP Readings from Last 3 Encounters:   07/05/18 98/60   06/25/18 104/54   06/21/18 105/64    Wt Readings from Last 3 Encounters:   07/05/18 54 kg (119 lb)   06/25/18 51.3 kg (113 lb)   05/01/18 56 kg (123 lb 8 oz)         Reviewed and updated as needed this visit by clinical staff  Tobacco  Meds       Reviewed and updated as needed this visit by Provider       ROS:  Constitutional, HEENT, cardiovascular, pulmonary, GI, , musculoskeletal, neuro, skin, endocrine and psych systems are negative, except as otherwise noted.    This document serves as a record of the services and decisions personally performed and made by Jluis Marroquin MD. It was created on his behalf by Karla Sorto, a trained medical scribe. The creation of this document is based on the provider's statements to the medical scribe.  Karla Sorto July 5, 2018 3:02 PM    OBJECTIVE:     BP 98/60  Pulse 109  Temp 99.7  F (37.6  C)  Resp 15  Ht 1.575 m (5' 2\")  Wt 54 kg (119 lb)  SpO2 99%  BMI 21.77 kg/m2  Body mass index is 21.77 kg/(m^2).  GENERAL: healthy, alert and no distress  EYES: Eyes grossly normal to inspection, PERRL and conjunctivae and sclerae normal  RESP: lungs clear to auscultation - no rales, rhonchi or wheezes  CV: regular rate and rhythm, normal S1 S2, no S3 or S4, no murmur, click or rub, no peripheral edema and peripheral pulses strong  MS: no gross musculoskeletal defects noted, mild edema   SKIN: left lower leg, from ankle to mid calf, there's some mild residual reddened quality without blanching type erythema and no ulcerations. There's a residual marking on his anterior aspect of the calf with a skin marking pen and the reddened quality has clearly receded a great deal although there remains some findings of erythema. Scab, less than 1 cm in diameter on anterior mid-calf, healing well.   PSYCH: mentation appears normal, affect normal/bright    Diagnostic Test Results:  No orders of the defined types were placed " in this encounter.        ASSESSMENT/PLAN:         (L03.116) Cellulitis of left lower extremity  (primary encounter diagnosis)  Comment: satisfactory improvement  Plan: leg elevation and keep an eye on things . Update me please with a status report in 1-2 weeks    (M80.00XS) Age-related osteoporosis with current pathological fracture, sequela  Comment: will see patient back in November   Plan: will order next Denosumab injection (Prolia) when needed    (Z94.4) Liver replaced by transplant (H)  Comment: noted as a point of historical importance   Plan: as above     (M47.816) Spondylosis of lumbar region without myelopathy or radiculopathy  Comment: refills provided   Plan: traMADol (ULTRAM) 50 MG tablet            (G31.84) Mild cognitive impairment  Comment: 6 CIT dementia screen he scored 14/28   Plan: this is quite concerning as this is consistent with a dementia. Will chat with daughter ; we already reviewed some deeper questions about what are this patients home care needs and does he need to be moved to an assisted care living facility , etc.    Should improve in 1-2 weeks, and if not, should follow-up at that time, can send a [employer] message.         The information in this document, created by the medical scribe for me, accurately reflects the services I personally performed and the decisions made by me. I have reviewed and approved this document for accuracy.   MD Jluis Atkinson MD  [facility]

## 2018-07-09 NOTE — TELEPHONE ENCOUNTER
Offered open spot on 7/19 at 11:30 to patient. Kathleen accepted. Scheduling was contacted to add this appointment to my schedule.     -Godfrey Robles CCC-A

## 2018-07-09 NOTE — TELEPHONE ENCOUNTER
Reason for Call:  Other appointment    Detailed comments: Patient needs to come in to have hearing aids adjusted and to . Your next opening is 07/23/18. Will only see Fabian Mccracken. Did you want to use an ENT only or have the patient see someone else?  Please advise    Phone Number Patient can be reached at: Other phone number: [phone]     Best Time: ASAP    Can we leave a detailed message on this number? YES    Call taken on 7/9/2018 at 3:13 PM by Katina Collins

## 2018-07-19 ENCOUNTER — OFFICE VISIT (OUTPATIENT)
Dept: AUDIOLOGY | Facility: CLINIC | Age: 83
End: 2018-07-19
Payer: COMMERCIAL

## 2018-07-19 DIAGNOSIS — H90.6 MIXED HEARING LOSS, BILATERAL: Primary | ICD-10-CM

## 2018-07-19 PROCEDURE — 99207 ZZC NO CHARGE LOS: CPT | Performed by: AUDIOLOGIST

## 2018-07-19 PROCEDURE — V5299 HEARING SERVICE: HCPCS | Performed by: AUDIOLOGIST

## 2018-07-19 NOTE — MR AVS SNAPSHOT
After Visit Summary   7/19/2018    Aman Baker    MRN: [id number]           Patient Information     Date Of Birth          5/6/1934        Visit Information        Provider Department      7/19/2018 11:30 AM Godfrey Mccracken AuD [facility]        Today's Diagnoses     Mixed hearing loss, bilateral    -  1       Follow-ups after your visit        Who to contact     If you have questions or need follow up information about today's clinic visit or your schedule please contact [facility] directly at [phone].  Normal or non-critical lab and imaging results will be communicated to you by mig33hart, letter or phone within 4 business days after the clinic has received the results. If you do not hear from us within 7 days, please contact the clinic through [employer] or phone. If you have a critical or abnormal lab result, we will notify you by phone as soon as possible.  Submit refill requests through [employer] or call your pharmacy and they will forward the refill request to us. Please allow 3 business days for your refill to be completed.          Additional Information About Your Visit        MyChart Information     [employer] gives you secure access to your electronic health record. If you see a primary care provider, you can also send messages to your care team and make appointments. If you have questions, please call your primary care clinic.  If you do not have a primary care provider, please call [phone] and they will assist you.        Care EveryWhere ID     This is your Care EveryWhere ID. This could be used by other organizations to access your [place] medical records  [id number]         Blood Pressure from Last 3 Encounters:   07/05/18 98/60   06/25/18 104/54   06/21/18 105/64    Weight from Last 3 Encounters:   07/05/18 119 lb (54 kg)   06/25/18 113 lb (51.3 kg)   05/01/18 123 lb 8 oz (56 kg)              We Performed the Following     HEARING AID CHECK/NO  CHARGE        Primary Care Provider Office Phone # Fax #    [facility] [phone] [address] [facility] [address]        Equal Access to Services     KOKO CHOI : Hadii aad ku hadgranto Soomaali, waaxda luqadaha, qaybta kaalmada adeegyada, charlette ryanrowena desai. So [facility] [phone].    ATENCIÓN: Si habla español, tiene a flynn disposición servicios gratuitos de asistencia lingüística. Llame al [phone].    We comply with applicable federal civil rights laws and [place] laws. We do not discriminate on the basis of race, color, national origin, age, disability, sex, sexual orientation, or gender identity.            Thank you!     Thank you for choosing [facility]  for your care. Our goal is always to provide you with excellent care. Hearing back from our patients is one way we can continue to improve our services. Please take a few minutes to complete the written survey that you may receive in the mail after your visit with us. Thank you!             Your Updated Medication List - Protect others around you: Learn how to safely use, store and throw away your medicines at www.disposemymeds.org.          This list is accurate as of 7/19/18 12:26 PM.  Always use your most recent med list.                   Brand Name Dispense Instructions for use Diagnosis    ACE/ARB/ARNI NOT PRESCRIBED (INTENTIONAL)      Please choose reason not prescribed, below    Type 2 diabetes mellitus without complication, without long-term current use of insulin (H)       apixaban ANTICOAGULANT 2.5 MG tablet    ELIQUIS    180 tablet    Take 1 tablet (2.5 mg) by mouth 2 times daily    Chronic atrial fibrillation (H)       calcitRIOL 0.25 MCG capsule    ROCALTROL     Take 1 capsule by mouth daily.        DAILY MULTIVITAMIN PO      Take 1 tablet daily        furosemide 20 MG tablet    LASIX    30 tablet    Take 1 tablet (20 mg) by mouth daily        GABAPENTIN PO       Take 300 mg by mouth 3 times daily        levothyroxine 50 MCG tablet    SYNTHROID/LEVOTHROID     Take 1 tablet by mouth daily.        magnesium 250 MG tablet      Take 1 tablet by mouth daily.        metoprolol tartrate 100 MG tablet    LOPRESSOR    180 tablet    Take 1 tablet (100 mg) by mouth 2 times daily    Chronic atrial fibrillation (H)       mycophenolate 250 MG capsule     540 capsule    Take 3 capsules (750 mg) by mouth 2 times daily    Liver replaced by transplant (H)       predniSONE 5 MG tablet    DELTASONE    90 tablet    TAKE ONE TABLET BY MOUTH EVERY DAY    Liver replaced by transplant (H)       simvastatin 20 MG tablet    ZOCOR    90 tablet    TAKE 1 TABLET (20 MG) BY MOUTH AT BEDTIME    Liver replaced by transplant (H), Lipids abnormal       traMADol 50 MG tablet    ULTRAM    60 tablet    Take 1 tablet (50 mg) by mouth 2 times daily as needed    Spondylosis of lumbar region without myelopathy or radiculopathy       vitamin D 1000 units capsule      Take 1 capsule by mouth 2 times daily.

## 2018-07-19 NOTE — PROGRESS NOTES
AUDIOLOGY REPORT: HEARING AID RECHECK    SUBJECTIVE: Aman Baker is a 84 year old male, :  1934, was seen in the Audiology Clinic at  [facility] on 18 for a return check of their hearing aids. The patient was accompanied by their daughter.      Background:   Patient is here today to  and have adjusted his repaired right ear Phonak Virto hearing aid.     Procedures:   Hearing aids were connected to the computer and to each other to check function and compatibility. Patient reports good sound quality and clarity.     Plan:   Patient will return as needed for hearing aid concerns.     NO CHARGE VISIT    Godfrey Robles CCC-A  Licensed Audiologist  2018

## 2018-07-24 DIAGNOSIS — I48.20 CHRONIC ATRIAL FIBRILLATION (H): Primary | ICD-10-CM

## 2018-08-06 ENCOUNTER — OFFICE VISIT (OUTPATIENT)
Dept: AUDIOLOGY | Facility: CLINIC | Age: 83
End: 2018-08-06
Payer: COMMERCIAL

## 2018-08-06 DIAGNOSIS — E03.9 ACQUIRED HYPOTHYROIDISM: Primary | ICD-10-CM

## 2018-08-06 DIAGNOSIS — H90.6 MIXED HEARING LOSS, BILATERAL: Primary | ICD-10-CM

## 2018-08-06 PROCEDURE — 99207 ZZC NO CHARGE LOS: CPT | Performed by: AUDIOLOGIST

## 2018-08-06 PROCEDURE — V5299 HEARING SERVICE: HCPCS | Performed by: AUDIOLOGIST

## 2018-08-06 NOTE — TELEPHONE ENCOUNTER
levothyroxine (SYNTHROID, LEVOTHROID) 50 MCG tablet        Last Written Prescription Date:  NA  Last Fill Quantity: NA,   # refills: NA   Last Office Visit: 7/5/2108  Future Office visit:       Routing refill request to provider for review/approval because:  Medication is reported/historical

## 2018-08-06 NOTE — MR AVS SNAPSHOT
After Visit Summary   8/6/2018    Aman Baker    MRN: [id number]           Patient Information     Date Of Birth          5/6/1934        Visit Information        Provider Department      8/6/2018 2:00 PM Carol Perkins AuD [facility]        Today's Diagnoses     Mixed hearing loss, bilateral    -  1       Follow-ups after your visit        Who to contact     If you have questions or need follow up information about today's clinic visit or your schedule please contact [facility] directly at [phone].  Normal or non-critical lab and imaging results will be communicated to you by Izooblehart, letter or phone within 4 business days after the clinic has received the results. If you do not hear from us within 7 days, please contact the clinic through [employer] or phone. If you have a critical or abnormal lab result, we will notify you by phone as soon as possible.  Submit refill requests through [employer] or call your pharmacy and they will forward the refill request to us. Please allow 3 business days for your refill to be completed.          Additional Information About Your Visit        MyChart Information     [employer] gives you secure access to your electronic health record. If you see a primary care provider, you can also send messages to your care team and make appointments. If you have questions, please call your primary care clinic.  If you do not have a primary care provider, please call [phone] and they will assist you.        Care EveryWhere ID     This is your Care EveryWhere ID. This could be used by other organizations to access your [place] medical records  [id number]         Blood Pressure from Last 3 Encounters:   07/05/18 98/60   06/25/18 104/54   06/21/18 105/64    Weight from Last 3 Encounters:   07/05/18 119 lb (54 kg)   06/25/18 113 lb (51.3 kg)   05/01/18 123 lb 8 oz (56 kg)              We Performed the Following     HEARING AID CHECK/NO CHARGE         Primary Care Provider Office Phone # Fax #    [facility] [phone] [address] [facility] [address]        Equal Access to Services     KOKO CHOI : Hadii aad ku hadgranto Soomaali, waaxda luqadaha, qaybta kaalmada adeegyada, charlette ryanrowena giselle. So [facility] [phone].    ATENCIÓN: Si habla español, tiene a flynn disposición servicios gratuitos de asistencia lingüística. Llame al [phone].    We comply with applicable federal civil rights laws and [place] laws. We do not discriminate on the basis of race, color, national origin, age, disability, sex, sexual orientation, or gender identity.            Thank you!     Thank you for choosing [facility]  for your care. Our goal is always to provide you with excellent care. Hearing back from our patients is one way we can continue to improve our services. Please take a few minutes to complete the written survey that you may receive in the mail after your visit with us. Thank you!             Your Updated Medication List - Protect others around you: Learn how to safely use, store and throw away your medicines at www.disposemymeds.org.          This list is accurate as of 8/6/18  4:04 PM.  Always use your most recent med list.                   Brand Name Dispense Instructions for use Diagnosis    ACE/ARB/ARNI NOT PRESCRIBED (INTENTIONAL)      Please choose reason not prescribed, below    Type 2 diabetes mellitus without complication, without long-term current use of insulin (H)       apixaban ANTICOAGULANT 2.5 MG tablet    ELIQUIS    180 tablet    Take 1 tablet (2.5 mg) by mouth 2 times daily    Chronic atrial fibrillation (H)       calcitRIOL 0.25 MCG capsule    ROCALTROL     Take 1 capsule by mouth daily.        DAILY MULTIVITAMIN PO      Take 1 tablet daily        furosemide 20 MG tablet    LASIX    30 tablet    Take 1 tablet (20 mg) by mouth daily        GABAPENTIN PO      Take 300  mg by mouth 3 times daily        levothyroxine 50 MCG tablet    SYNTHROID/LEVOTHROID     Take 1 tablet by mouth daily.        magnesium 250 MG tablet      Take 1 tablet by mouth daily.        metoprolol tartrate 100 MG tablet    LOPRESSOR    180 tablet    Take 1 tablet (100 mg) by mouth 2 times daily    Chronic atrial fibrillation (H)       mycophenolate 250 MG capsule     540 capsule    Take 3 capsules (750 mg) by mouth 2 times daily    Liver replaced by transplant (H)       predniSONE 5 MG tablet    DELTASONE    90 tablet    TAKE ONE TABLET BY MOUTH EVERY DAY    Liver replaced by transplant (H)       simvastatin 20 MG tablet    ZOCOR    90 tablet    TAKE 1 TABLET (20 MG) BY MOUTH AT BEDTIME    Liver replaced by transplant (H), Lipids abnormal       traMADol 50 MG tablet    ULTRAM    60 tablet    Take 1 tablet (50 mg) by mouth 2 times daily as needed    Spondylosis of lumbar region without myelopathy or radiculopathy       vitamin D 1000 units capsule      Take 1 capsule by mouth 2 times daily.

## 2018-08-06 NOTE — TELEPHONE ENCOUNTER
Pending Prescriptions:                       Disp   Refills    levothyroxine (SYNTHROID/LEVOTHROID) 50 M*90 tab*1            Sig: TAKE 1 TABLET BY MOUTH ONCE DAILY.    Routing refill request to provider for review/approval because:  Medication is reported/historical.    Mela Coon RN

## 2018-08-06 NOTE — PROGRESS NOTES
HEARING AID RECHECK    Patient Name:  Aman Baker    Patient Age:   84 year old    :  1934    Background:   Patient is seen today to address fit issues with his Phonak Virto V50-13 full shell ITEs. Patient is accompanied to today's appointment by his daughter, Kathleen. Patient and Kathleen report that the hearing aids cause him significant discomfort in the canal, helix, and dakota bowl areas. Patient has never reported this before because he was previously using his old hearing aids more often. He has recently begun to wear the newer pair more often, and this is when a problem was discovered. Kathleen does note that Aman had been wearing the hearing aids , because he was caring for his wife, who recently passed away and had dementia, and needed to hear her at night.     Procedures:   Otoscopy finds reddened ear canals and dakota bowls, particularly the right ear. Patient had difficulty tolerating insertion of the hearing aids to evaluate fit.    Recommend that the hearing aids be sent in for shell modification. The  for patient's wife is on Friday, and it is important for patient to hear well. Programmed two loaner Phonak Audeo V30 RICs with double domes for patient to use at the . Patient had some difficulty with insertion today, but Kathleen is a longtime [employer] user and is staying with Aman for the next two weeks, so she will assist with insertion. They are aware that patient is responsible for the cost of the loaners should they be lost or damaged.    Also discussed Caption Call phone as patient has difficulty hearing on the phone. Patient was interested in this. Caption Call form submitted on his behalf. Per Kathleen's request, she will be the  for setup.     Plan:   Patient will be contacted once his hearing aids are back to set up an appointment so that he can  his hearing aids and return the loaners. Recommended that patient limit use of hearing aids for the next few  days to help his ears heal. Also recommended that patient not wear hearing aids at night anymore- informed him that it is important to allow his ears to air out to prevent skin breakdown. Patient was in agreement with this, since his wife has passed and he is no longer in a caregiving role. Cautioned Kathleen to pay attention for signs of infection (odor, drainage, redness, etc.) and set up an appointment with ENT should there be any concern about skin breakdown or infection.     NO CHARGE VISIT    Santiago Zelaya, CCC-A  Licensed Audiologist  8/6/2018

## 2018-08-07 RX ORDER — LEVOTHYROXINE SODIUM 50 UG/1
TABLET ORAL
Qty: 90 TABLET | Refills: 1 | Status: SHIPPED | OUTPATIENT
Start: 2018-08-07 | End: 2019-01-01

## 2018-08-09 ENCOUNTER — OFFICE VISIT (OUTPATIENT)
Dept: FAMILY MEDICINE | Facility: CLINIC | Age: 83
End: 2018-08-09
Payer: COMMERCIAL

## 2018-08-09 ENCOUNTER — TELEPHONE (OUTPATIENT)
Dept: INTERNAL MEDICINE | Facility: CLINIC | Age: 83
End: 2018-08-09

## 2018-08-09 VITALS
SYSTOLIC BLOOD PRESSURE: 102 MMHG | TEMPERATURE: 97.6 F | HEIGHT: 62 IN | HEART RATE: 73 BPM | OXYGEN SATURATION: 100 % | RESPIRATION RATE: 16 BRPM | WEIGHT: 128.2 LBS | DIASTOLIC BLOOD PRESSURE: 64 MMHG | BODY MASS INDEX: 23.59 KG/M2

## 2018-08-09 DIAGNOSIS — S90.522A: ICD-10-CM

## 2018-08-09 DIAGNOSIS — L08.9: ICD-10-CM

## 2018-08-09 DIAGNOSIS — R60.0 BILATERAL LOWER EXTREMITY EDEMA: Primary | ICD-10-CM

## 2018-08-09 PROCEDURE — 99214 OFFICE O/P EST MOD 30 MIN: CPT | Performed by: INTERNAL MEDICINE

## 2018-08-09 RX ORDER — CLINDAMYCIN HCL 150 MG
150 CAPSULE ORAL 3 TIMES DAILY
Qty: 30 CAPSULE | Refills: 0 | Status: SHIPPED | OUTPATIENT
Start: 2018-08-09 | End: 2018-01-01

## 2018-08-09 NOTE — PATIENT INSTRUCTIONS
Elevate legs above horizontal 4 times daily for 20 to 30 minutes.   Put antibiotics on Band-Aid and cover blister with this.    Wearing snug ace wraps during the day and taking them off at night recommended.     Begin antibiotics as directed.           [facility]    If you have any questions regarding to your visit please contact your care team:     Team Pink:   Clinic Hours Telephone Number   Internal Medicine:  Dr. Diana Nguyen, NP       7am-7pm  Monday - Thursday   7am-5pm  Fridays  [phone]  (Appointment scheduling available 24/7)    Questions about your recent visit?  Team Line  [phone]   Ur[facility] - [place] [facility] - 11am-9pm Monday-Friday Saturday-Sunday- 9am-5pm   [place] - 5pm-9pm Monday-Friday Saturday-Sunday- 9am-5pm  [phone] - [place]  [phone] - [place]       What options do I have for a visit other than an office visit? We offer electronic visits (e-visits) and telephone visits, when medically appropriate.  Please check with your medical insurance to see if these types of visits are covered, as you will be responsible for any charges that are not paid by your insurance.      You can use [employer] (secure electronic communication) to access to your chart, send your primary care provider a message, or make an appointment. Ask a team member how to get started.     For a price quote for your services, please call our Consumer Price Line at [phone] or our Imaging Cost estimation line at [phone] (for imaging tests).  Kalpana SHIELDS CMA ([facility])

## 2018-08-09 NOTE — TELEPHONE ENCOUNTER
Reason for call:  Patient reporting a symptom    Symptom or request: blisters    Duration (how long have symptoms been present): today    Have you been treated for this before? No    Additional comments: Patient's daughter states patient has blisters above ankle and left leg. She is worried about a possible spider bite or something like that. There are no openings in [place] today daughter would like patient worked in to schedule at [place]. Offered to look at other sites but wants to come to [place]. Please call ASAP. Thank you     Phone Number patient can be reached at:  Other phone number:  [phone] perla Jones Time:  ASAP    Can we leave a detailed message on this number:  YES    Call taken on 8/9/2018 at 11:30 AM by Katina Collins

## 2018-08-09 NOTE — MR AVS SNAPSHOT
After Visit Summary   8/9/2018    Aman Baker    MRN: [id number]           Patient Information     Date Of Birth          5/6/1934        Visit Information        Provider Department      8/9/2018 3:00 PM Jluis Marroquin MD [facility]        Today's Diagnoses     Bilateral lower extremity edema    -  1    Blister of ankle with infection, left, initial encounter          Care Instructions    Elevate legs above horizontal 4 times daily for 20 to 30 minutes.   Put antibiotics on Band-Aid and cover blister with this.    Wearing snug ace wraps during the day and taking them off at night recommended.     Begin antibiotics as directed.           [facility]    If you have any questions regarding to your visit please contact your care team:     Team Pink:   Clinic Hours Telephone Number   Internal Medicine:  Dr. Diana Nguyen, NP       7am-7pm  Monday - Thursday   7am-5pm  Fridays  [phone]  (Appointment scheduling available 24/7)    Questions about your recent visit?  Team Line  [phone]   Urgent Care - [place] and [place] - 11am-9pm Monday-Friday Saturday-Sunday- 9am-5pm   [place] - 5pm-9pm Monday-Friday Saturday-Sunday- 9am-5pm  [phone] - [place]  [phone] - [place]       What options do I have for a visit other than an office visit? We offer electronic visits (e-visits) and telephone visits, when medically appropriate.  Please check with your medical insurance to see if these types of visits are covered, as you will be responsible for any charges that are not paid by your insurance.      You can use [employer] (secure electronic communication) to access to your chart, send your primary care provider a message, or make an appointment. Ask a team member how to get started.     For a price quote for your services, please call our Consumer Price Line at [phone] or our Imaging Cost estimation line  "at [phone] (for imaging tests).  Kalpana SHIELDS CMA ([facility])            Follow-ups after your visit        Who to contact     If you have questions or need follow up information about today's clinic visit or your schedule please contact [facility] directly at [phone].  Normal or non-critical lab and imaging results will be communicated to you by MyChart, letter or phone within 4 business days after the clinic has received the results. If you do not hear from us within 7 days, please contact the clinic through [employer] or phone. If you have a critical or abnormal lab result, we will notify you by phone as soon as possible.  Submit refill requests through [employer] or call your pharmacy and they will forward the refill request to us. Please allow 3 business days for your refill to be completed.          Additional Information About Your Visit        MyChart Information     [employer] gives you secure access to your electronic health record. If you see a primary care provider, you can also send messages to your care team and make appointments. If you have questions, please call your primary care clinic.  If you do not have a primary care provider, please call [phone] and they will assist you.        Care EveryWhere ID     This is your Care EveryWhere ID. This could be used by other organizations to access your [place] medical records  [id number]        Your Vitals Were     Pulse Temperature Respirations Height Pulse Oximetry BMI (Body Mass Index)    73 97.6  F (36.4  C) (Oral) 16 5' 2\" (1.575 m) 100% 23.45 kg/m2       Blood Pressure from Last 3 Encounters:   08/09/18 102/64   07/05/18 98/60   06/25/18 104/54    Weight from Last 3 Encounters:   08/09/18 128 lb 3.2 oz (58.2 kg)   07/05/18 119 lb (54 kg)   06/25/18 113 lb (51.3 kg)              Today, you had the following     No orders found for display         Today's Medication Changes          These changes are accurate as of 8/9/18  3:46 PM.  If " you have any questions, ask your nurse or doctor.               Start taking these medicines.        Dose/Directions    clindamycin 150 MG capsule   Commonly known as:  CLEOCIN   Used for:  Blister of ankle with infection, left, initial encounter, Bilateral lower extremity edema   Started by:  Jluis Marroquin MD        Dose:  150 mg   Take 1 capsule (150 mg) by mouth 3 times daily   Quantity:  30 capsule   Refills:  0            Where to get your medicines      These medications were sent to [facility] [address] [facility] - [place], [place] - [address]  [address], [place] [address]     Phone:  [phone]     clindamycin 150 MG capsule                Primary Care Provider Office Phone # Fax #    [facility] [phone]       [address] [facility] [address]        Equal Access to Services     [facility]: Hadii lo escobedo hadasho Soomaali, waaxda luqadaha, qaybta kaalmada adeegyada, charlette finch . So [facility] [place] [facility] [phone].    ATENCIÓN: Si habla español, tiene a flynn disposición servicios gratuitos de asistencia lingüística. [facility] [phone].    We comply with applicable federal civil rights laws and [place] laws. We do not discriminate on the basis of race, color, national origin, age, disability, sex, sexual orientation, or gender identity.            Thank you!     Thank you for choosing [facility]  for your care. Our goal is always to provide you with excellent care. Hearing back from our patients is one way we can continue to improve our services. Please take a few minutes to complete the written survey that you may receive in the mail after your visit with us. Thank you!             Your Updated Medication List - Protect others around you: Learn how to safely use, store and throw away your medicines at www.disposemymeds.org.          This list is accurate as of 8/9/18  3:46 PM.  Always use your most recent med list.                   Brand  Name Dispense Instructions for use Diagnosis    ACE/ARB/ARNI NOT PRESCRIBED (INTENTIONAL)      Please choose reason not prescribed, below    Type 2 diabetes mellitus without complication, without long-term current use of insulin (H)       apixaban ANTICOAGULANT 2.5 MG tablet    ELIQUIS    180 tablet    Take 1 tablet (2.5 mg) by mouth 2 times daily    Chronic atrial fibrillation (H)       calcitRIOL 0.25 MCG capsule    ROCALTROL     Take 1 capsule by mouth daily.        clindamycin 150 MG capsule    CLEOCIN    30 capsule    Take 1 capsule (150 mg) by mouth 3 times daily    Blister of ankle with infection, left, initial encounter, Bilateral lower extremity edema       DAILY MULTIVITAMIN PO      Take 1 tablet daily        furosemide 20 MG tablet    LASIX    30 tablet    Take 1 tablet (20 mg) by mouth daily        GABAPENTIN PO      Take 300 mg by mouth 3 times daily        levothyroxine 50 MCG tablet    SYNTHROID/LEVOTHROID    90 tablet    TAKE 1 TABLET BY MOUTH ONCE DAILY.    Acquired hypothyroidism       magnesium 250 MG tablet      Take 1 tablet by mouth daily.        metoprolol tartrate 100 MG tablet    LOPRESSOR    180 tablet    Take 1 tablet (100 mg) by mouth 2 times daily    Chronic atrial fibrillation (H)       mycophenolate 250 MG capsule     540 capsule    Take 3 capsules (750 mg) by mouth 2 times daily    Liver replaced by transplant (H)       predniSONE 5 MG tablet    DELTASONE    90 tablet    TAKE ONE TABLET BY MOUTH EVERY DAY    Liver replaced by transplant (H)       simvastatin 20 MG tablet    ZOCOR    90 tablet    TAKE 1 TABLET (20 MG) BY MOUTH AT BEDTIME    Liver replaced by transplant (H), Lipids abnormal       traMADol 50 MG tablet    ULTRAM    60 tablet    Take 1 tablet (50 mg) by mouth 2 times daily as needed    Spondylosis of lumbar region without myelopathy or radiculopathy       vitamin D 1000 units capsule      Take 1 capsule by mouth 2 times daily.

## 2018-08-09 NOTE — PROGRESS NOTES
SUBJECTIVE:   Aman Baker is a 84 year old male who presents to clinic today for the following health issues:      Concern - Blisters on left lower extremity      Onset: Patient states yesterday 8/8/2018    Description:   Left leg, little red,     Intensity: moderate    Progression of Symptoms:  worsening    Accompanying Signs & Symptoms:  None     Previous history of similar problem:   None     Precipitating factors:   Worsened by: None    Alleviating factors:  Improved by: None    Therapies Tried and outcome: None    Patient is present with family member.     Patient reports of red blisters on left leg since last night. Patient has a history of cellulitis in the left leg. Denies pain or burning sensation in the leg. He reported that leg is itchy more than anything.   He reported that swelling has improved.   Patient reports that he has slight neuropathy in the toes.       Patient has a history of prednisone induced skin sensitivities.     Patient has discontinued use of lasix. And he sleeps in a recliner and virtually never elevates his legs for any reason    Problem list and histories reviewed & adjusted, as indicated.  Additional history: as documented    Patient Active Problem List   Diagnosis     Liver replaced by transplant (H)     Chronic atrial fibrillation (H)     Primary osteoarthritis of both knees     MGUS (monoclonal gammopathy of unknown significance)     Spondylosis of lumbar region without myelopathy or radiculopathy     Alpha-1-antitrypsin deficiency (H)     Prostate cancer (H)     Type 2 diabetes mellitus without complication, without long-term current use of insulin (H)     Age-related osteoporosis with current pathological fracture, sequela     Anemia, unspecified type     Acquired hypothyroidism     Decubitus ulcer of back, stage 1     Gait difficulty     Loss of balance     Cellulitis of left lower extremity     Mild cognitive impairment     Past Surgical History:   Procedure Laterality  Date     TRANSPLANT  2004    liver transplant       Social History   Substance Use Topics     Smoking status: Never Smoker     Smokeless tobacco: Never Used     Alcohol use No     History reviewed. No pertinent family history.      Current Outpatient Prescriptions   Medication Sig Dispense Refill     ACE/ARB/ARNI NOT PRESCRIBED, INTENTIONAL, Please choose reason not prescribed, below       apixaban ANTICOAGULANT (ELIQUIS) 2.5 MG tablet Take 1 tablet (2.5 mg) by mouth 2 times daily 180 tablet 1     calcitRIOL (ROCALTROL) 0.25 MCG capsule Take 1 capsule by mouth daily.       CELLCEPT (BRAND) 250 MG CAPSULE Take 3 capsules (750 mg) by mouth 2 times daily 540 capsule 3     Cholecalciferol (VITAMIN D) 1000 UNIT capsule Take 1 capsule by mouth 2 times daily.       GABAPENTIN PO Take 300 mg by mouth 3 times daily       levothyroxine (SYNTHROID/LEVOTHROID) 50 MCG tablet TAKE 1 TABLET BY MOUTH ONCE DAILY. 90 tablet 1     Magnesium 250 MG tablet Take 1 tablet by mouth daily.       metoprolol tartrate (LOPRESSOR) 100 MG tablet Take 1 tablet (100 mg) by mouth 2 times daily 180 tablet 3     Multiple Vitamin (DAILY MULTIVITAMIN PO) Take 1 tablet daily       predniSONE (DELTASONE) 5 MG tablet TAKE ONE TABLET BY MOUTH EVERY DAY 90 tablet 3     simvastatin (ZOCOR) 20 MG tablet TAKE 1 TABLET (20 MG) BY MOUTH AT BEDTIME 90 tablet 2     traMADol (ULTRAM) 50 MG tablet Take 1 tablet (50 mg) by mouth 2 times daily as needed 60 tablet 3     furosemide (LASIX) 20 MG tablet Take 1 tablet (20 mg) by mouth daily 30 tablet 0     Allergies   Allergen Reactions     Penicillins Hives and Rash     Sulfa Drugs Hives and Rash     Recent Labs   Lab Test  06/21/18   1810  05/01/18   1542  04/10/18   0934  08/19/16   0829   07/16/10   1033   A1C   --   5.7*   --    --    --    --    LDL   --   61   --    --    --   75   HDL   --   38*   --    --    --   40   TRIG   --   92   --    --    --   151*   ALT  10   --   7  30   < >   --    CR  1.02   --   1.05  " 1.27*   < >   --    GFRESTIMATED  70   --   67  54*   < >   --    GFRESTBLACK  84   --   81  66   < >   --    POTASSIUM  4.2   --   3.6  3.8   < >   --    TSH   --   1.11   --    --    --    --     < > = values in this interval not displayed.      BP Readings from Last 3 Encounters:   08/09/18 102/64   07/05/18 98/60   06/25/18 104/54    Wt Readings from Last 3 Encounters:   08/09/18 58.2 kg (128 lb 3.2 oz)   07/05/18 54 kg (119 lb)   06/25/18 51.3 kg (113 lb)                  Labs reviewed in EPIC    Reviewed and updated as needed this visit by clinical staff       Reviewed and updated as needed this visit by Provider         ROS:  Constitutional, HEENT, cardiovascular, pulmonary, GI, , musculoskeletal, neuro, skin, endocrine and psych systems are negative, except as otherwise noted.    This document serves as a record of the services and decisions personally performed and made by Jluis Marroquin MD. It was created on her behalf by Raejsh Livingston, a trained medical scribe. The creation of this document is based on the provider's statements to the medical scribe.  Rajesh Livingston August 9, 2018 3:24 PM      OBJECTIVE:     /64  Pulse 73  Temp 97.6  F (36.4  C) (Oral)  Resp 16  Ht 1.575 m (5' 2\")  Wt 58.2 kg (128 lb 3.2 oz)  SpO2 100%  BMI 23.45 kg/m2  Body mass index is 23.45 kg/(m^2).  GENERAL: healthy, alert and no distress  EYES: Eyes grossly normal to inspection, PERRL and conjunctivae and sclerae normal  RESP: lungs clear to auscultation - no rales, rhonchi or wheezes  CV: regular rate and rhythm, normal S1 S2, no S3 or S4, no murmur, click or rub, no peripheral edema and peripheral pulses strong  MS/SKIN: Pitting edema bilateral lower extremities left greater then right . chronic Stasis dermatitis and chronic lower extremity edema. No evidence of full blown infection although there is some mild generalized reddened quality with blanching type erythema with left lower extremity .one blister was " "unroofed and the underlying tissue seems healthy. It's impossible to exclude a component of infection although I suspect this is more chronic lower extremity edema with stasis dermatitis     NEURO: Normal strength and tone, sensory exam grossly normal, mentation intact and normal monofilament exam  PSYCH: mentation appears normal, affect normal/bright    Diagnostic Test Results:  none     ASSESSMENT/PLAN:     Tobacco Cessation:   reports that he has never smoked. He has never used smokeless tobacco.      BMI:   Estimated body mass index is 23.45 kg/(m^2) as calculated from the following:    Height as of this encounter: 1.575 m (5' 2\").    Weight as of this encounter: 58.2 kg (128 lb 3.2 oz).         1. Bilateral lower extremity edema  Chronic. Patient will take clindamycin as directed as preventative.   - clindamycin (CLEOCIN) 150 MG capsule; Take 1 capsule (150 mg) by mouth 3 times daily  Dispense: 30 capsule; Refill: 0    2. Blister of ankle with infection, left, initial encounter  Onset last night. Patient has history of cellulitis. Patient will take clindamycin as directed as preventative.    - clindamycin (CLEOCIN) 150 MG capsule; Take 1 capsule (150 mg) by mouth 3 times daily  Dispense: 30 capsule; Refill: 0    Patient instructions discussed with patient    The information in this document, created by the medical scribe for me, accurately reflects the services I personally performed and the decisions made by me. I have reviewed and approved this document for accuracy prior to leaving the patient care area.  August 9, 2018 6:14 PM      Jluis Marorquin MD  [facility]    "

## 2018-08-13 ENCOUNTER — TELEPHONE (OUTPATIENT)
Dept: AUDIOLOGY | Facility: CLINIC | Age: 83
End: 2018-08-13

## 2018-08-13 NOTE — TELEPHONE ENCOUNTER
Informed patient's daughter that patient's hearing aids are back from remake. Scheduled add-on appointment on Thursday 8/16 at 9:00 for pick-up and return of loaner hearing aids.     Santiago Milligan, KOIK-A

## 2018-08-16 ENCOUNTER — OFFICE VISIT (OUTPATIENT)
Dept: AUDIOLOGY | Facility: CLINIC | Age: 83
End: 2018-08-16
Payer: COMMERCIAL

## 2018-08-16 DIAGNOSIS — H90.3 SENSORINEURAL HEARING LOSS, BILATERAL: Primary | ICD-10-CM

## 2018-08-16 PROCEDURE — 99207 ZZC NO CHARGE LOS: CPT | Performed by: AUDIOLOGIST

## 2018-08-16 PROCEDURE — V5299 HEARING SERVICE: HCPCS | Performed by: AUDIOLOGIST

## 2018-08-16 NOTE — PROGRESS NOTES
HEARING AID RECHECK    Patient Name:  Aman Baker    Patient Age:   84 year old    :  1934    Background:   Patient is seen today to  his remade Phonak Virto V50 ITEs. The hearing aids were sent for remake due to soreness in both ears. Patient has been borrowing a pair of clinic Phonak Audeo RICs. Patient is accompanied to today's appointment by his daughter, Kathleen.    Procedures:   Patient's ears look much better today. No pressure sores noted. Remade hearing aids were a good physical fit in patient's ears. They did not seem to be as tight in the ears. Patient denied discomfort. Re-ran feedback calibration on both hearing aids. Reviewed use of volume control. Encouraged patient to wear the devices during all waking hours. Reminded him that the hearing aids should not be worn overnight. Instructed patient to alert myself or Kathleen immediately should he continue to have discomfort with the remade hearing aids.    Patient returned loaner hearing aids.      Plan:   Patient to return as needed for hearing aid services.    NO CHARGE VISIT    Santiago Zelaya, CCC-A  Licensed Audiologist  2018

## 2018-08-16 NOTE — MR AVS SNAPSHOT
After Visit Summary   8/16/2018    Aman Baker    MRN: [id number]           Patient Information     Date Of Birth          5/6/1934        Visit Information        Provider Department      8/16/2018 9:00 AM Carol Perkins AuD [facility]        Today's Diagnoses     Sensorineural hearing loss, bilateral    -  1       Follow-ups after your visit        Who to contact     If you have questions or need follow up information about today's clinic visit or your schedule please contact [facility] directly at [phone].  Normal or non-critical lab and imaging results will be communicated to you by [employer] Whispers Musichart, letter or phone within 4 business days after the clinic has received the results. If you do not hear from us within 7 days, please contact the clinic through [employer] Musichart or phone. If you have a critical or abnormal lab result, we will notify you by phone as soon as possible.  Submit refill requests through Indix or call your pharmacy and they will forward the refill request to us. Please allow 3 business days for your refill to be completed.          Additional Information About Your Visit        MyChart Information     Indix gives you secure access to your electronic health record. If you see a primary care provider, you can also send messages to your care team and make appointments. If you have questions, please call your primary care clinic.  If you do not have a primary care provider, please call [phone] and they will assist you.        Care EveryWhere ID     This is your Care EveryWhere ID. This could be used by other organizations to access your [place] medical records  [id number]         Blood Pressure from Last 3 Encounters:   08/09/18 102/64   07/05/18 98/60   06/25/18 104/54    Weight from Last 3 Encounters:   08/09/18 128 lb 3.2 oz (58.2 kg)   07/05/18 119 lb (54 kg)   06/25/18 113 lb (51.3 kg)              We Performed the Following     HEARING AID  CHECK/NO CHARGE        Primary Care Provider Office Phone # Fax #    [facility] [phone]       [address] [facility] [address] [facility] [address]        Equal Access to Services     KOKO CHOI : Hadii aad ku hadgranto Soomaali, waaxda luqadaha, qaybta kaalmada adeegyada, charlette yeagern burke moya laTabatharowena desai. So [facility] [phone].    ATENCIÓN: Si habla español, tiene a flynn disposición servicios gratuitos de asistencia lingüística. Llame al [phone].    We comply with applicable federal civil rights laws and [place] laws. We do not discriminate on the basis of race, color, national origin, age, disability, sex, sexual orientation, or gender identity.            Thank you!     Thank you for choosing [facility]  for your care. Our goal is always to provide you with excellent care. Hearing back from our patients is one way we can continue to improve our services. Please take a few minutes to complete the written survey that you may receive in the mail after your visit with us. Thank you!             Your Updated Medication List - Protect others around you: Learn how to safely use, store and throw away your medicines at www.disposemymeds.org.          This list is accurate as of 8/16/18 10:38 AM.  Always use your most recent med list.                   Brand Name Dispense Instructions for use Diagnosis    ACE/ARB/ARNI NOT PRESCRIBED (INTENTIONAL)      Please choose reason not prescribed, below    Type 2 diabetes mellitus without complication, without long-term current use of insulin (H)       apixaban ANTICOAGULANT 2.5 MG tablet    ELIQUIS    180 tablet    Take 1 tablet (2.5 mg) by mouth 2 times daily    Chronic atrial fibrillation (H)       calcitRIOL 0.25 MCG capsule    ROCALTROL     Take 1 capsule by mouth daily.        clindamycin 150 MG capsule    CLEOCIN    30 capsule    Take 1 capsule (150 mg) by mouth 3 times daily    Blister of ankle with infection, left, initial  encounter, Bilateral lower extremity edema       DAILY MULTIVITAMIN PO      Take 1 tablet daily        furosemide 20 MG tablet    LASIX    30 tablet    Take 1 tablet (20 mg) by mouth daily        GABAPENTIN PO      Take 300 mg by mouth 3 times daily        levothyroxine 50 MCG tablet    SYNTHROID/LEVOTHROID    90 tablet    TAKE 1 TABLET BY MOUTH ONCE DAILY.    Acquired hypothyroidism       magnesium 250 MG tablet      Take 1 tablet by mouth daily.        metoprolol tartrate 100 MG tablet    LOPRESSOR    180 tablet    Take 1 tablet (100 mg) by mouth 2 times daily    Chronic atrial fibrillation (H)       mycophenolate 250 MG capsule     540 capsule    Take 3 capsules (750 mg) by mouth 2 times daily    Liver replaced by transplant (H)       predniSONE 5 MG tablet    DELTASONE    90 tablet    TAKE ONE TABLET BY MOUTH EVERY DAY    Liver replaced by transplant (H)       simvastatin 20 MG tablet    ZOCOR    90 tablet    TAKE 1 TABLET (20 MG) BY MOUTH AT BEDTIME    Liver replaced by transplant (H), Lipids abnormal       traMADol 50 MG tablet    ULTRAM    60 tablet    Take 1 tablet (50 mg) by mouth 2 times daily as needed    Spondylosis of lumbar region without myelopathy or radiculopathy       vitamin D 1000 units capsule      Take 1 capsule by mouth 2 times daily.

## 2018-09-10 NOTE — MR AVS SNAPSHOT
After Visit Summary   9/10/2018    Aman Baker    MRN: [id number]           Patient Information     Date Of Birth          5/6/1934        Visit Information        Provider Department      9/10/2018 2:30 PM Jluis Marroquin MD [facility]        Today's Diagnoses     Cellulitis of left lower extremity    -  1    Alpha-1-antitrypsin deficiency (H)        Prostate cancer (H)        Liver replaced by transplant (H)        Primary osteoarthritis of both knees        Spondylosis of lumbar region without myelopathy or radiculopathy        Urinary frequency        Chronic atrial fibrillation (H)          Care Instructions    [facility]    If you have any questions regarding to your visit please contact your care team:     Team Pink:   Clinic Hours Telephone Number   Internal Medicine:  Dr. Diana Nguyen NP 7am-7pm  Monday - Thursday   7am-5pm  Fridays  [phone]  (Appointment scheduling available 24/7)   Urgent Care - [place] - 11am-9pm Monday-Friday Saturday-Sunday- 9am-5pm   [place] - 5pm-9pm Monday-Friday Saturday-Sunday- 9am-5pm  [phone] - [place]  [phone] - [place]       What options do I have for a visit other than an office visit? We offer electronic visits (e-visits) and telephone visits, when medically appropriate.  Please check with your medical insurance to see if these types of visits are covered, as you will be responsible for any charges that are not paid by your insurance.      You can use Mendeley (secure electronic communication) to access to your chart, send your primary care provider a message, or make an appointment. Ask a team member how to get started.     For a price quote for your services, please call our Consumer Price Line at [phone] or our Imaging Cost estimation line at [phone] (for imaging tests).  Kalpana SHIELDS CMA ([facility])            Follow-ups after your  visit        Additional Services     CARDIOLOGY EVAL ADULT REFERRAL       Preferred location:  [facility] [phone]   [email or web address]    Please be aware that coverage of these services is subject to the terms and limitations of your health insurance plan.  Call member services at your health plan with any benefit or coverage questions.      Please bring the following to your appointment:  Any x-rays, CTs or MRIs which have been performed. Contact the facility where they were done to arrange for  prior to your scheduled appointment.    List of current medications  This referral request   Any documents/labs given to you for this referral            UROLOGY ADULT REFERRAL       Your provider has referred you to: [facility]: [facility] [phone]   [email or web address]    Please be aware that coverage of these services is subject to the terms and limitations of your health insurance plan.  Call member services at your health plan with any benefit or coverage questions.      Please bring the following with you to your appointment:    (1) Any X-Rays, CTs or MRIs which have been performed.  Contact the facility where they were done to arrange for  prior to your scheduled appointment.    (2) List of current medications  (3) This referral request   (4) Any documents/labs given to you for this referral                  Who to contact     If you have questions or need follow up information about today's clinic visit or your schedule please contact [facility] directly at [phone].  Normal or non-critical lab and imaging results will be communicated to you by MyChart, letter or phone within 4 business days after the clinic has received the results. If you do not hear from us within 7 days, please contact the clinic through MyChart or phone. If you have a critical or  "abnormal lab result, we will notify you by phone as soon as possible.  Submit refill requests through [employer] Dive or call your pharmacy and they will forward the refill request to us. Please allow 3 business days for your refill to be completed.          Additional Information About Your Visit        Kanarihart Information     Indust[employer] Dive gives you secure access to your electronic health record. If you see a primary care provider, you can also send messages to your care team and make appointments. If you have questions, please call your primary care clinic.  If you do not have a primary care provider, please call [phone] and they will assist you.        Care EveryWhere ID     This is your Care EveryWhere ID. This could be used by other organizations to access your [place] medical records  [id number]        Your Vitals Were     Pulse Temperature Respirations Height Pulse Oximetry BMI (Body Mass Index)    98 98.9  F (37.2  C) (Oral) 16 5' 2\" (1.575 m) 98% 24.29 kg/m2       Blood Pressure from Last 3 Encounters:   09/10/18 111/68   08/09/18 102/64   07/05/18 98/60    Weight from Last 3 Encounters:   09/10/18 132 lb 12.8 oz (60.2 kg)   08/09/18 128 lb 3.2 oz (58.2 kg)   07/05/18 119 lb (54 kg)              We Performed the Following     CARDIOLOGY EVAL ADULT REFERRAL     UROLOGY ADULT REFERRAL          Today's Medication Changes          These changes are accurate as of 9/10/18  3:14 PM.  If you have any questions, ask your nurse or doctor.               Stop taking these medicines if you haven't already. Please contact your care team if you have questions.     clindamycin 150 MG capsule   Commonly known as:  CLEOCIN   Stopped by:  Jluis Marroquin MD           metoprolol tartrate 100 MG tablet   Commonly known as:  LOPRESSOR   Stopped by:  Jluis Marroquin MD           simvastatin 20 MG tablet   Commonly known as:  ZOCOR   Stopped by:  Jluis Marroquin MD                    Primary Care Provider Office Phone # Fax #    [id number] " [facility] [phone]       [address] [facility] [address]        Equal Access to Services     KOKO CHOI : Hadii aad ku hadct Sonahid, walichada luqbrendan, konstantinta kaerinda manny, charlette ryanrowena desai. So [facility] [phone].    ATENCIÓN: Si habla español, tiene a flynn disposición servicios gratuitos de asistencia lingüística. Llame al [phone].    We comply with applicable federal civil rights laws and [place] laws. We do not discriminate on the basis of race, color, national origin, age, disability, sex, sexual orientation, or gender identity.            Thank you!     Thank you for choosing [facility]  for your care. Our goal is always to provide you with excellent care. Hearing back from our patients is one way we can continue to improve our services. Please take a few minutes to complete the written survey that you may receive in the mail after your visit with us. Thank you!             Your Updated Medication List - Protect others around you: Learn how to safely use, store and throw away your medicines at www.disposemymeds.org.          This list is accurate as of 9/10/18  3:14 PM.  Always use your most recent med list.                   Brand Name Dispense Instructions for use Diagnosis    ACE/ARB/ARNI NOT PRESCRIBED (INTENTIONAL)      Please choose reason not prescribed, below    Type 2 diabetes mellitus without complication, without long-term current use of insulin (H)       apixaban ANTICOAGULANT 2.5 MG tablet    ELIQUIS    180 tablet    Take 1 tablet (2.5 mg) by mouth 2 times daily    Chronic atrial fibrillation (H)       calcitRIOL 0.25 MCG capsule    ROCALTROL     Take 1 capsule by mouth daily.        DAILY MULTIVITAMIN PO      Take 1 tablet daily        furosemide 20 MG tablet    LASIX    30 tablet    Take 1 tablet (20 mg) by mouth daily        GABAPENTIN PO      Take 300 mg by mouth 3 times daily        levothyroxine 50 MCG tablet     SYNTHROID/LEVOTHROID    90 tablet    TAKE 1 TABLET BY MOUTH ONCE DAILY.    Acquired hypothyroidism       magnesium 250 MG tablet      Take 1 tablet by mouth daily.        mycophenolate 250 MG capsule     540 capsule    Take 3 capsules (750 mg) by mouth 2 times daily    Liver replaced by transplant (H)       predniSONE 5 MG tablet    DELTASONE    90 tablet    TAKE ONE TABLET BY MOUTH EVERY DAY    Liver replaced by transplant (H)       traMADol 50 MG tablet    ULTRAM    60 tablet    Take 1 tablet (50 mg) by mouth 2 times daily as needed    Spondylosis of lumbar region without myelopathy or radiculopathy       vitamin D 1000 units capsule      Take 1 capsule by mouth 2 times daily.

## 2018-09-10 NOTE — PROGRESS NOTES
SUBJECTIVE:   Aman Baker is a 84 year old male who presents to clinic today for the following health issues:    Aman is present today with his daughter today.       Cellulitis of left lower extremity  Alpha-1-antitrypsin deficiency (H)  Prostate cancer (H)  Liver replaced by transplant (H)  Primary osteoarthritis of both knees  Spondylosis of lumbar region without myelopathy or radiculopathy  Urinary frequency  Chronic atrial fibrillation (H)     Fever and Fatigue  I saw patient for a check-up regarding several minor issues including lower leg swelling and so forth. Roughly 3 weeks later he was hospitalized. He reports that he was hospitalized for having a fever and fatigue after working in the garden. The fever has resided since. There were no conclusive findings from his stay but it was noted that his symptoms were possibly due to a transient viral infection.  His discharge medications included cessation of his beta blocker and I asked to consider restarting, etc. See assessment and plan section      Hypotension  He reports that he is not currently using metoprolol. His daughter reports that his weight is a little more than usual but that this is likely due to his recent hydration after the hospital visit. This was a major concern today. This patient has just not got a good general sense here. Concerns are regarding his general nutrional status and is he getting enough food. There's a bit of a sense of a lowgrade failure to thrive type picture. We had a long discussion today along with daughter / primary caregiver.    Urinary Frequency  He notes that he has increased urinary frequency and nevertheless his prostate cancer is in remission. He has decreased his fluid intake in order to avoid urinating so frequently during the night. He mentions that sometimes he wets his robe right away in the morning. There is no associated pain with urination. It is his frustrations with frequent urination that he's  dropped his fluid intake but I think this is a horrible set of decisions and leads to a dark path. Better to get a urology perspective with his urination . An evaluation is suggested     Chronic A-Fib  His cardiologist has since moved and his daughter is curious whether he should follow up with a new cardiologist for this condition. He doesn't need a pacemaker and he has been asymptomatic for years. No evidence of a tachy-charla syndrome. Remains with anticoagulation      Problem list and histories reviewed & adjusted, as indicated.  Additional history: as documented  Medications list reviewed with patient today    Patient Active Problem List   Diagnosis     Liver replaced by transplant (H)     Chronic atrial fibrillation (H)     Primary osteoarthritis of both knees     MGUS (monoclonal gammopathy of unknown significance)     Spondylosis of lumbar region without myelopathy or radiculopathy     Alpha-1-antitrypsin deficiency (H)     Prostate cancer (H)     Type 2 diabetes mellitus without complication, without long-term current use of insulin (H)     Age-related osteoporosis with current pathological fracture, sequela     Anemia, unspecified type     Acquired hypothyroidism     Decubitus ulcer of back, stage 1     Gait difficulty     Loss of balance     Cellulitis of left lower extremity     Mild cognitive impairment     Past Surgical History:   Procedure Laterality Date     TRANSPLANT  2004    liver transplant       Social History   Substance Use Topics     Smoking status: Never Smoker     Smokeless tobacco: Never Used     Alcohol use No     History reviewed. No pertinent family history.      BP Readings from Last 3 Encounters:   09/10/18 111/68   08/09/18 102/64   07/05/18 98/60    Wt Readings from Last 3 Encounters:   09/10/18 60.2 kg (132 lb 12.8 oz)   08/09/18 58.2 kg (128 lb 3.2 oz)   07/05/18 54 kg (119 lb)        Reviewed and updated as needed this visit by clinical staff       Reviewed and updated as needed  "this visit by Provider       ROS:  Constitutional, HEENT, cardiovascular, pulmonary, GI, , musculoskeletal, neuro, skin, endocrine and psych systems are negative, except as otherwise noted.    This document serves as a record of the services and decisions personally performed and made by Jluis Marroquin MD. It was created on his behalf by David Munoz, a trained medical scribe. The creation of this document is based on the provider's statements to the medical scribe.  David Munoz 2:51 PM September 10, 2018    OBJECTIVE:     /68  Pulse 98  Temp 98.9  F (37.2  C) (Oral)  Resp 16  Ht 1.575 m (5' 2\")  Wt 60.2 kg (132 lb 12.8 oz)  SpO2 98%  BMI 24.29 kg/m2  Body mass index is 24.29 kg/(m^2).  GENERAL: healthy, alert and no distress, seems weakened and generally rather slowed mentally  EYES: Eyes grossly normal to inspection, PERRL and conjunctivae and sclerae normal  SKIN: no suspicious lesions or rashes to visible skin  NEURO: Normal strength and tone, mentation intact and speech normal  PSYCH: mentation appears normal, affect normal/bright    Diagnostic Test Results:  No results found for this or any previous visit (from the past 24 hour(s)).    ASSESSMENT/PLAN:     (L03.116) Cellulitis of left lower extremity  (primary encounter diagnosis)  Comment: no evidence of active infection   Plan: ongoing monitoring      (E88.01) Alpha-1-antitrypsin deficiency (H)  Comment: noted as a point of historical importance   Plan: as above     (C61) Prostate cancer (H)  Comment: a follow up appointment to review nocturia  Plan: UROLOGY ADULT REFERRAL            (Z94.4) Liver replaced by transplant (H)  Comment: stable phase of chronic illness   Plan: continue current plan of care with  Anti-rejection medications     (M17.0) Primary osteoarthritis of both knees  Comment: patient discuss with me his use of Gabapentin [Neurontin] and I reviewed his entire medication list and could find no clear cut or obvious reason for " this medication   Plan: he says his generalized osteoarthritis pain isn't that bad - schedule for gradual dose reduction with Gabapentin [Neurontin] until stopped    (M47.816) Spondylosis of lumbar region without myelopathy or radiculopathy  Comment: as above   Plan: discontinue Gabapentin [Neurontin] as tolerated    (R35.0) Urinary frequency  Comment: as detailed above   Plan: UROLOGY ADULT REFERRAL            (I48.2) Chronic atrial fibrillation (H)  Comment: patient requests a cardiology  Follow up although honestly it seems a tad unclear to me if this is really necessary !  Plan: CARDIOLOGY EVAL ADULT REFERRAL              See Patient Instructions    The information in this document, created by the medical scribe for me, accurately reflects the services I personally performed and the decisions made by me. I have reviewed and approved this document for accuracy prior to leaving the patient care area.  September 10, 2018 2:51 PM    Jluis Marroquin MD  [facility]

## 2018-09-10 NOTE — PATIENT INSTRUCTIONS
[facility]    If you have any questions regarding to your visit please contact your care team:     Team Pink:   Clinic Hours Telephone Number   Internal Medicine:  Dr. Diana Nguyen NP 7am-7pm  Monday - Thursday   7am-5pm  Fridays  [phone]  (Appointment scheduling available 24/7)   Urgent Care - [place] [facility] - 11am-9pm Monday-Friday Saturday-Sunday- 9am-5pm   [place] - 5pm-9pm Monday-Friday Saturday-Sunday- 9am-5pm  [phone] - [place]  [phone] - [place]       What options do I have for a visit other than an office visit? We offer electronic visits (e-visits) and telephone visits, when medically appropriate.  Please check with your medical insurance to see if these types of visits are covered, as you will be responsible for any charges that are not paid by your insurance.      You can use Pulmatrix (secure electronic communication) to access to your chart, send your primary care provider a message, or make an appointment. Ask a team member how to get started.     For a price quote for your services, please call our Consumer Price Line at [phone] or our Imaging Cost estimation line at [phone] (for imaging tests).  Kalpana SHIELDS CMA ([facility])

## 2018-09-25 PROBLEM — R26.89 LOSS OF BALANCE: Status: RESOLVED | Noted: 2018-05-08 | Resolved: 2018-01-01

## 2018-09-25 PROBLEM — R26.9 GAIT DIFFICULTY: Status: RESOLVED | Noted: 2018-05-08 | Resolved: 2018-01-01

## 2018-09-25 NOTE — PROGRESS NOTES
Subjective:  HPI                    Objective:  System    Physical Exam    General     ROS    Assessment/Plan:    DISCHARGE REPORT    Progress reporting period is from 5.8 to 9.25.18.     SUBJECTIVE                   ;   ,     Patient has failed to return to therapy so current objective findings are unknown.    OBJECTIVE         ASSESSMENT/PLAN  Updated problem list and treatment plan: Diagnosis 1:  Gait    STG/LTGs have been met or progress has been made towards goals:  None  Assessment of Progress: Patient has not returned to therapy.  Current status is unknown and discharge G code cannot be reported.  Self Management Plans:  Patient has been instructed in a home treatment program.  Patient  has been instructed in self management of symptoms.    Aman continues to require the following intervention to meet STG and LTG's:   The patient failed to complete scheduled/ordered appointments so current information is unknown.  We will discharge this patient from PT.    Recommendations:      Please refer to the daily flowsheet for treatment today, total treatment time and time spent performing 1:1 timed codes.

## 2018-10-04 NOTE — TELEPHONE ENCOUNTER
Spoke with Krista, who states that patient's right hearing aid was chewed by a dog. She reports that the case is damaged but the hearing aid is still working. Warned Krista of potential charges for recase or loss & damage replacement and she understood. Krista would like a loaner hearing aid programmed for Aman. Warned her that it will not be the same as his current hearing aid as he currently wears a custom device, but he has used a loaner PRADEEP before so he may be familiar with it.     Krista will drop off patient's hearing aid to be sent in, and she will  the loaner device. Krista will be called once the hearing aid is back from repair.     Santiago Milligan, CCC-A  Licensed Audiologist #[id number]  10/4/2018

## 2018-10-18 NOTE — TELEPHONE ENCOUNTER
Left message for patient's daughter, Krista, informing her that patient's hearing aid is back from repair and is ready for pick-up. Repair was done without recase or loss & damage charges, so it is no charge to the patient. Hearing aid settings have been verified. Reminded Krista to return loaner hearing aid upon pick-up of patient's hearing aid.     Santiago Milligan, CCC-A  Licensed Audiologist #[id number]  10/18/2018

## 2018-11-14 NOTE — TELEPHONE ENCOUNTER
Routing refill request to provider for review/approval because:  Failed [facility] refill protocol, see below:      Requested Prescriptions   Pending Prescriptions Disp Refills     ELIQUIS 2.5 MG tablet [Pharmacy Med Name: ELIQUIS 2.5 MG TABLET]  Last Written Prescription Date:  5/17/18  Last Fill Quantity: 180,  # refills: 1   Last office visit: No previous visit found with prescribing provider:     Future Office Visit:     180 tablet 1     Sig: TAKE 1 TABLET BY MOUTH 2 TIMES DAILY    Direct Oral Anticoagulant Agents Failed    11/14/2018  7:36 AM       Failed - Patient is 18-79 years of age       Failed - Weight is greater than 60 kg for the past year    Wt Readings from Last 3 Encounters:   09/10/18 132 lb 12.8 oz (60.2 kg)   08/09/18 128 lb 3.2 oz (58.2 kg)   07/05/18 119 lb (54 kg)            Passed - Normal Platelets on file in past 12 months    Recent Labs   Lab Test  06/21/18   1810   01/04/18   1436   PLT  223   < >   --    44838   --    --   235    < > = values in this interval not displayed.              Passed - Medication is active on med list       Passed - Serum creatinine less than or equal to 1.4 on file in past 12 mos    Recent Labs   Lab Test  06/21/18   1810   CR  1.02            Passed - Recent (6 mo) or future (30 days) visit within the authorizing provider's specialty        Palak Horvath RN - BC

## 2018-11-27 NOTE — TELEPHONE ENCOUNTER
Refills can be provided . However Patient has history of vertebral compression fractures and for treatment we usually use 70 milligrams not 35 milligrams. Please double check prescription and    If I am right, refills provide times 1 year     Jluis Marroquin MD

## 2018-11-27 NOTE — TELEPHONE ENCOUNTER
ALENdronate (FOSAMAX) 35 MG tablet       Last Written Prescription Date:    Last Fill Quantity: ,   # refills:   Last Office Visit: 09/10/18  Future Office visit:       Routing refill request to provider for review/approval because:  Drug not active on patient's medication list  Medication is reported/historical

## 2018-11-28 NOTE — TELEPHONE ENCOUNTER
Patients daughter is calling wanting a call back at [phone] regarding her dads prescription.

## 2018-11-28 NOTE — TELEPHONE ENCOUNTER
Routing refill request to provider for review/approval because:  Labs not current:  Dexa scan, last one completed in 2014    Nikki Morocho RN

## 2018-11-29 NOTE — TELEPHONE ENCOUNTER
Daughter, Kathleen, would like Dr. Marroquin to refill Gabapenting 300mg TID and alendronate 70 mg weekly.  Was previously prescribed by former PCP but patient is now seeing Dr. Herzog    Please review prescriptions kassie'd up and associate Dx and sign if approved.  No call back to the daughter needed unless there are any issues    Steffi Anthony RN

## 2018-11-29 NOTE — TELEPHONE ENCOUNTER
I called number and only got voice mail message . Please check with daughter tomorrow if there are additional questions to answer. Reroute if additional input requested from me     Jluis Marroquin MD

## 2018-11-29 NOTE — TELEPHONE ENCOUNTER
Kathleen is returning call. Alendronate is 70 mg one time a week. Gabapentin is 300 mg 3 times daily. Please call Kathleen [phone].

## 2018-12-20 NOTE — TELEPHONE ENCOUNTER
Ultram prescription faxed to [facility] pharmacy at [phone].  Savita Johnson,

## 2018-12-20 NOTE — TELEPHONE ENCOUNTER
[place] reviewed:  reviewed- no concerns    [place]  [email or web address]    Palak Horvath RN - BC

## 2018-12-20 NOTE — TELEPHONE ENCOUNTER
Controlled Substance Refill Request for traMADol (ULTRAM) 50 MG tablet  Problem List Complete:  No     PROVIDER TO CONSIDER COMPLETION OF PROBLEM LIST AND OVERVIEW/CONTROLLED SUBSTANCE AGREEMENT    Last Written Prescription Date:  7/5/20128  Last Fill Quantity: 60,   # refills: 3    Last Office Visit with [facility] [place]: 9/10/2018    Future Office visit:     Controlled substance agreement on file: No.     Processing:  NA   checked in past 3 months?  No, route to RN

## 2019-01-01 ENCOUNTER — NURSING HOME VISIT (OUTPATIENT)
Dept: GERIATRICS | Facility: CLINIC | Age: 84
End: 2019-01-01
Payer: MEDICARE

## 2019-01-01 ENCOUNTER — TELEPHONE (OUTPATIENT)
Dept: AUDIOLOGY | Facility: CLINIC | Age: 84
End: 2019-01-01

## 2019-01-01 ENCOUNTER — DOCUMENTATION ONLY (OUTPATIENT)
Dept: TRANSPLANT | Facility: CLINIC | Age: 84
End: 2019-01-01

## 2019-01-01 ENCOUNTER — TRANSFERRED RECORDS (OUTPATIENT)
Dept: HEALTH INFORMATION MANAGEMENT | Facility: CLINIC | Age: 84
End: 2019-01-01

## 2019-01-01 ENCOUNTER — TELEPHONE (OUTPATIENT)
Dept: TRANSPLANT | Facility: CLINIC | Age: 84
End: 2019-01-01

## 2019-01-01 ENCOUNTER — TELEPHONE (OUTPATIENT)
Dept: FAMILY MEDICINE | Facility: CLINIC | Age: 84
End: 2019-01-01

## 2019-01-01 ENCOUNTER — ALLIED HEALTH/NURSE VISIT (OUTPATIENT)
Dept: AUDIOLOGY | Facility: CLINIC | Age: 84
End: 2019-01-01

## 2019-01-01 ENCOUNTER — ALLIED HEALTH/NURSE VISIT (OUTPATIENT)
Dept: AUDIOLOGY | Facility: CLINIC | Age: 84
End: 2019-01-01
Payer: MEDICARE

## 2019-01-01 ENCOUNTER — OFFICE VISIT (OUTPATIENT)
Dept: OTOLARYNGOLOGY | Facility: CLINIC | Age: 84
End: 2019-01-01
Payer: MEDICARE

## 2019-01-01 ENCOUNTER — MEDICAL CORRESPONDENCE (OUTPATIENT)
Dept: HEALTH INFORMATION MANAGEMENT | Facility: CLINIC | Age: 84
End: 2019-01-01

## 2019-01-01 ENCOUNTER — HOSPITAL LABORATORY (OUTPATIENT)
Facility: OTHER | Age: 84
End: 2019-01-01

## 2019-01-01 ENCOUNTER — MYC MEDICAL ADVICE (OUTPATIENT)
Dept: FAMILY MEDICINE | Facility: CLINIC | Age: 84
End: 2019-01-01

## 2019-01-01 ENCOUNTER — TELEPHONE (OUTPATIENT)
Dept: INTERNAL MEDICINE | Facility: CLINIC | Age: 84
End: 2019-01-01

## 2019-01-01 ENCOUNTER — OFFICE VISIT (OUTPATIENT)
Dept: FAMILY MEDICINE | Facility: CLINIC | Age: 84
End: 2019-01-01
Payer: MEDICARE

## 2019-01-01 VITALS
BODY MASS INDEX: 20.15 KG/M2 | SYSTOLIC BLOOD PRESSURE: 95 MMHG | TEMPERATURE: 97.8 F | HEART RATE: 98 BPM | WEIGHT: 128.4 LBS | OXYGEN SATURATION: 97 % | RESPIRATION RATE: 16 BRPM | DIASTOLIC BLOOD PRESSURE: 65 MMHG | HEIGHT: 67 IN

## 2019-01-01 VITALS
WEIGHT: 118.9 LBS | SYSTOLIC BLOOD PRESSURE: 101 MMHG | OXYGEN SATURATION: 96 % | RESPIRATION RATE: 16 BRPM | DIASTOLIC BLOOD PRESSURE: 65 MMHG | HEART RATE: 78 BPM | TEMPERATURE: 97.3 F | BODY MASS INDEX: 18.66 KG/M2 | HEIGHT: 67 IN

## 2019-01-01 VITALS
OXYGEN SATURATION: 97 % | WEIGHT: 120 LBS | BODY MASS INDEX: 21.95 KG/M2 | RESPIRATION RATE: 18 BRPM | DIASTOLIC BLOOD PRESSURE: 58 MMHG | SYSTOLIC BLOOD PRESSURE: 96 MMHG | TEMPERATURE: 98.1 F | HEART RATE: 82 BPM

## 2019-01-01 VITALS
HEIGHT: 62 IN | HEART RATE: 108 BPM | DIASTOLIC BLOOD PRESSURE: 58 MMHG | WEIGHT: 132 LBS | BODY MASS INDEX: 24.29 KG/M2 | SYSTOLIC BLOOD PRESSURE: 106 MMHG

## 2019-01-01 VITALS
HEART RATE: 67 BPM | SYSTOLIC BLOOD PRESSURE: 115 MMHG | TEMPERATURE: 98.2 F | WEIGHT: 123 LBS | DIASTOLIC BLOOD PRESSURE: 72 MMHG | BODY MASS INDEX: 22.5 KG/M2 | RESPIRATION RATE: 17 BRPM | OXYGEN SATURATION: 98 %

## 2019-01-01 VITALS
TEMPERATURE: 96.7 F | HEART RATE: 108 BPM | DIASTOLIC BLOOD PRESSURE: 58 MMHG | BODY MASS INDEX: 24.14 KG/M2 | WEIGHT: 132 LBS | SYSTOLIC BLOOD PRESSURE: 106 MMHG | RESPIRATION RATE: 20 BRPM | OXYGEN SATURATION: 100 %

## 2019-01-01 VITALS
TEMPERATURE: 98.4 F | RESPIRATION RATE: 17 BRPM | WEIGHT: 126 LBS | SYSTOLIC BLOOD PRESSURE: 94 MMHG | BODY MASS INDEX: 19.78 KG/M2 | OXYGEN SATURATION: 96 % | HEIGHT: 67 IN | DIASTOLIC BLOOD PRESSURE: 60 MMHG | HEART RATE: 94 BPM

## 2019-01-01 VITALS
TEMPERATURE: 97.6 F | OXYGEN SATURATION: 97 % | HEART RATE: 88 BPM | DIASTOLIC BLOOD PRESSURE: 62 MMHG | BODY MASS INDEX: 22.31 KG/M2 | SYSTOLIC BLOOD PRESSURE: 106 MMHG | WEIGHT: 122 LBS | RESPIRATION RATE: 22 BRPM

## 2019-01-01 DIAGNOSIS — E03.9 ACQUIRED HYPOTHYROIDISM: ICD-10-CM

## 2019-01-01 DIAGNOSIS — H90.6 MIXED HEARING LOSS, BILATERAL: Primary | ICD-10-CM

## 2019-01-01 DIAGNOSIS — E44.0 MODERATE PROTEIN-CALORIE MALNUTRITION (H): ICD-10-CM

## 2019-01-01 DIAGNOSIS — Z51.5 HOSPICE CARE PATIENT: ICD-10-CM

## 2019-01-01 DIAGNOSIS — F02.818 LATE ONSET ALZHEIMER'S DISEASE WITH BEHAVIORAL DISTURBANCE (H): ICD-10-CM

## 2019-01-01 DIAGNOSIS — M54.50 CHRONIC BILATERAL LOW BACK PAIN WITHOUT SCIATICA: ICD-10-CM

## 2019-01-01 DIAGNOSIS — H61.23 BILATERAL IMPACTED CERUMEN: Primary | ICD-10-CM

## 2019-01-01 DIAGNOSIS — E11.9 TYPE 2 DIABETES MELLITUS WITHOUT COMPLICATION, WITHOUT LONG-TERM CURRENT USE OF INSULIN (H): ICD-10-CM

## 2019-01-01 DIAGNOSIS — I48.20 CHRONIC ATRIAL FIBRILLATION (H): ICD-10-CM

## 2019-01-01 DIAGNOSIS — Z94.4 LIVER REPLACED BY TRANSPLANT (H): ICD-10-CM

## 2019-01-01 DIAGNOSIS — L03.116 CELLULITIS OF LEFT LOWER EXTREMITY: ICD-10-CM

## 2019-01-01 DIAGNOSIS — L89.151 PRESSURE INJURY OF SACRAL REGION, STAGE 1: ICD-10-CM

## 2019-01-01 DIAGNOSIS — G30.1 LATE ONSET ALZHEIMER'S DISEASE WITH BEHAVIORAL DISTURBANCE (H): ICD-10-CM

## 2019-01-01 DIAGNOSIS — G89.29 OTHER CHRONIC PAIN: ICD-10-CM

## 2019-01-01 DIAGNOSIS — Z94.4 LIVER REPLACED BY TRANSPLANT (H): Primary | ICD-10-CM

## 2019-01-01 DIAGNOSIS — M80.00XS AGE-RELATED OSTEOPOROSIS WITH CURRENT PATHOLOGICAL FRACTURE, SEQUELA: ICD-10-CM

## 2019-01-01 DIAGNOSIS — G89.29 CHRONIC BILATERAL LOW BACK PAIN WITHOUT SCIATICA: ICD-10-CM

## 2019-01-01 DIAGNOSIS — C61 PROSTATE CANCER (H): ICD-10-CM

## 2019-01-01 DIAGNOSIS — H80.93 OTOSCLEROSIS OF BOTH EARS: ICD-10-CM

## 2019-01-01 DIAGNOSIS — C34.90 NON-SMALL CELL CARCINOMA OF LUNG (H): Primary | ICD-10-CM

## 2019-01-01 DIAGNOSIS — D64.9 ANEMIA, UNSPECIFIED TYPE: ICD-10-CM

## 2019-01-01 DIAGNOSIS — E11.9 TYPE 2 DIABETES MELLITUS WITHOUT COMPLICATION, WITHOUT LONG-TERM CURRENT USE OF INSULIN (H): Primary | ICD-10-CM

## 2019-01-01 DIAGNOSIS — R41.0 DELIRIUM: ICD-10-CM

## 2019-01-01 DIAGNOSIS — I10 ESSENTIAL HYPERTENSION: ICD-10-CM

## 2019-01-01 DIAGNOSIS — F03.90 DEMENTIA WITHOUT BEHAVIORAL DISTURBANCE, UNSPECIFIED DEMENTIA TYPE: Primary | ICD-10-CM

## 2019-01-01 DIAGNOSIS — G51.0: ICD-10-CM

## 2019-01-01 DIAGNOSIS — Q16.3: ICD-10-CM

## 2019-01-01 DIAGNOSIS — Z13.220 LIPID SCREENING: ICD-10-CM

## 2019-01-01 DIAGNOSIS — D72.829 LEUKOCYTOSIS, UNSPECIFIED TYPE: ICD-10-CM

## 2019-01-01 DIAGNOSIS — C34.90 NON-SMALL CELL CARCINOMA OF LUNG (H): ICD-10-CM

## 2019-01-01 DIAGNOSIS — R11.0 NAUSEA: ICD-10-CM

## 2019-01-01 DIAGNOSIS — M47.816 SPONDYLOSIS OF LUMBAR REGION WITHOUT MYELOPATHY OR RADICULOPATHY: ICD-10-CM

## 2019-01-01 DIAGNOSIS — R79.82 CRP ELEVATED: Primary | ICD-10-CM

## 2019-01-01 DIAGNOSIS — R53.81 PHYSICAL DECONDITIONING: Primary | ICD-10-CM

## 2019-01-01 DIAGNOSIS — Q16.9: ICD-10-CM

## 2019-01-01 LAB
ALBUMIN SERPL-MCNC: 3.4 G/DL (ref 3.4–5)
ALBUMIN SERPL-MCNC: 3.6 G/DL (ref 3.4–5)
ALBUMIN UR-MCNC: NEGATIVE MG/DL
ALP SERPL-CCNC: 103 U/L (ref 40–150)
ALP SERPL-CCNC: 79 U/L (ref 40–150)
ALT SERPL W P-5'-P-CCNC: 22 U/L (ref 0–70)
ALT SERPL W P-5'-P-CCNC: 34 U/L (ref 0–70)
ANION GAP SERPL CALCULATED.3IONS-SCNC: 3 MMOL/L (ref 3–14)
ANION GAP SERPL CALCULATED.3IONS-SCNC: 4 MMOL/L (ref 3–14)
ANION GAP SERPL CALCULATED.3IONS-SCNC: 4 MMOL/L (ref 3–14)
ANION GAP SERPL CALCULATED.3IONS-SCNC: 8 MMOL/L (ref 3–14)
APPEARANCE UR: CLEAR
AST SERPL W P-5'-P-CCNC: 24 U/L (ref 0–45)
AST SERPL W P-5'-P-CCNC: 25 U/L (ref 0–45)
BASOPHILS # BLD AUTO: 0 10E9/L (ref 0–0.2)
BASOPHILS # BLD AUTO: 0 10E9/L (ref 0–0.2)
BASOPHILS NFR BLD AUTO: 0.3 %
BASOPHILS NFR BLD AUTO: 0.3 %
BILIRUB DIRECT SERPL-MCNC: 0.1 MG/DL (ref 0–0.2)
BILIRUB SERPL-MCNC: 0.3 MG/DL (ref 0.2–1.3)
BILIRUB SERPL-MCNC: 0.4 MG/DL (ref 0.2–1.3)
BILIRUB UR QL STRIP: NEGATIVE
BUN SERPL-MCNC: 24 MG/DL (ref 7–30)
BUN SERPL-MCNC: 26 MG/DL (ref 7–30)
CALCIUM SERPL-MCNC: 9 MG/DL (ref 8.5–10.1)
CALCIUM SERPL-MCNC: 9.2 MG/DL (ref 8.5–10.1)
CALCIUM SERPL-MCNC: 9.3 MG/DL (ref 8.5–10.1)
CALCIUM SERPL-MCNC: 9.5 MG/DL (ref 8.5–10.1)
CHLORIDE SERPL-SCNC: 100 MMOL/L (ref 94–109)
CHLORIDE SERPL-SCNC: 101 MMOL/L (ref 94–109)
CHLORIDE SERPL-SCNC: 101 MMOL/L (ref 94–109)
CHLORIDE SERPL-SCNC: 105 MMOL/L (ref 94–109)
CHOLEST SERPL-MCNC: 171 MG/DL
CO2 SERPL-SCNC: 26 MMOL/L (ref 20–32)
CO2 SERPL-SCNC: 31 MMOL/L (ref 20–32)
CO2 SERPL-SCNC: 32 MMOL/L (ref 20–32)
CO2 SERPL-SCNC: 32 MMOL/L (ref 20–32)
COLOR UR AUTO: ABNORMAL
CREAT SERPL-MCNC: 0.95 MG/DL (ref 0.66–1.25)
CREAT SERPL-MCNC: 1.05 MG/DL (ref 0.66–1.25)
CREAT SERPL-MCNC: 1.07 MG/DL (ref 0.66–1.25)
CREAT SERPL-MCNC: 1.18 MG/DL (ref 0.66–1.25)
CRP SERPL-MCNC: 19.6 MG/L (ref 0–8)
DEPRECATED CALCIDIOL+CALCIFEROL SERPL-MC: 36 UG/L (ref 20–75)
DIFFERENTIAL METHOD BLD: ABNORMAL
DIFFERENTIAL METHOD BLD: ABNORMAL
EOSINOPHIL # BLD AUTO: 0 10E9/L (ref 0–0.7)
EOSINOPHIL # BLD AUTO: 0.2 10E9/L (ref 0–0.7)
EOSINOPHIL NFR BLD AUTO: 0.5 %
EOSINOPHIL NFR BLD AUTO: 2.6 %
ERYTHROCYTE [DISTWIDTH] IN BLOOD BY AUTOMATED COUNT: 14.1 % (ref 10–15)
ERYTHROCYTE [DISTWIDTH] IN BLOOD BY AUTOMATED COUNT: 14.3 % (ref 10–15)
ERYTHROCYTE [DISTWIDTH] IN BLOOD BY AUTOMATED COUNT: 15 % (ref 10–15)
ERYTHROCYTE [DISTWIDTH] IN BLOOD BY AUTOMATED COUNT: 15.4 % (ref 10–15)
ERYTHROCYTE [DISTWIDTH] IN BLOOD BY AUTOMATED COUNT: 15.5 % (ref 10–15)
ERYTHROCYTE [SEDIMENTATION RATE] IN BLOOD BY WESTERGREN METHOD: 66 MM/H (ref 0–20)
FOLATE RBC-MCNC: 1252 NG/ML
GFR SERPL CREATININE-BSD FRML MDRD: 56 ML/MIN/{1.73_M2}
GFR SERPL CREATININE-BSD FRML MDRD: 63 ML/MIN/{1.73_M2}
GFR SERPL CREATININE-BSD FRML MDRD: 64 ML/MIN/{1.73_M2}
GFR SERPL CREATININE-BSD FRML MDRD: 72 ML/MIN/{1.73_M2}
GLUCOSE SERPL-MCNC: 107 MG/DL (ref 70–99)
GLUCOSE SERPL-MCNC: 109 MG/DL (ref 70–99)
GLUCOSE SERPL-MCNC: 119 MG/DL (ref 70–99)
GLUCOSE SERPL-MCNC: 187 MG/DL (ref 70–99)
GLUCOSE UR STRIP-MCNC: NEGATIVE MG/DL
HBA1C MFR BLD: 5.9 % (ref 0–5.6)
HCT VFR BLD AUTO: 36.2 % (ref 40–53)
HCT VFR BLD AUTO: 36.5 % (ref 40–53)
HCT VFR BLD AUTO: 38.6 % (ref 40–53)
HCT VFR BLD AUTO: 39.9 % (ref 40–53)
HCT VFR BLD AUTO: 43.6 % (ref 40–53)
HCT VFR BLD CALC: NORMAL %
HDLC SERPL-MCNC: 41 MG/DL
HGB BLD-MCNC: 11.1 G/DL (ref 13.3–17.7)
HGB BLD-MCNC: 11.3 G/DL (ref 13.3–17.7)
HGB BLD-MCNC: 11.7 G/DL (ref 13.3–17.7)
HGB BLD-MCNC: 11.8 G/DL (ref 13.3–17.7)
HGB BLD-MCNC: 12.9 G/DL (ref 13.3–17.7)
HGB UR QL STRIP: ABNORMAL
IMM GRANULOCYTES # BLD: 0.1 10E9/L (ref 0–0.4)
IMM GRANULOCYTES NFR BLD: 1.3 %
KETONES UR STRIP-MCNC: NEGATIVE MG/DL
LDLC SERPL CALC-MCNC: 96 MG/DL
LEUKOCYTE ESTERASE UR QL STRIP: NEGATIVE
LYMPHOCYTES # BLD AUTO: 0.1 10E9/L (ref 0.8–5.3)
LYMPHOCYTES # BLD AUTO: 0.4 10E9/L (ref 0.8–5.3)
LYMPHOCYTES NFR BLD AUTO: 1.4 %
LYMPHOCYTES NFR BLD AUTO: 5.8 %
MAGNESIUM SERPL-MCNC: 1.9 MG/DL (ref 1.6–2.3)
MCH RBC QN AUTO: 29 PG (ref 26.5–33)
MCH RBC QN AUTO: 29.1 PG (ref 26.5–33)
MCH RBC QN AUTO: 29.5 PG (ref 26.5–33)
MCH RBC QN AUTO: 30.3 PG (ref 26.5–33)
MCH RBC QN AUTO: 30.4 PG (ref 26.5–33)
MCHC RBC AUTO-ENTMCNC: 29.6 G/DL (ref 31.5–36.5)
MCHC RBC AUTO-ENTMCNC: 29.6 G/DL (ref 31.5–36.5)
MCHC RBC AUTO-ENTMCNC: 30.3 G/DL (ref 31.5–36.5)
MCHC RBC AUTO-ENTMCNC: 30.7 G/DL (ref 31.5–36.5)
MCHC RBC AUTO-ENTMCNC: 31 G/DL (ref 31.5–36.5)
MCV RBC AUTO: 98 FL (ref 78–100)
MCV RBC AUTO: 99 FL (ref 78–100)
MONOCYTES # BLD AUTO: 0.6 10E9/L (ref 0–1.3)
MONOCYTES # BLD AUTO: 0.7 10E9/L (ref 0–1.3)
MONOCYTES NFR BLD AUTO: 11.4 %
MONOCYTES NFR BLD AUTO: 6.9 %
MUCOUS THREADS #/AREA URNS LPF: PRESENT /LPF
NEUTROPHILS # BLD AUTO: 5 10E9/L (ref 1.6–8.3)
NEUTROPHILS # BLD AUTO: 7.8 10E9/L (ref 1.6–8.3)
NEUTROPHILS NFR BLD AUTO: 79.9 %
NEUTROPHILS NFR BLD AUTO: 89.6 %
NITRATE UR QL: NEGATIVE
NONHDLC SERPL-MCNC: 130 MG/DL
NRBC # BLD AUTO: 0 10*3/UL
NRBC BLD AUTO-RTO: 0 /100
PH UR STRIP: 8 PH (ref 5–7)
PLATELET # BLD AUTO: 197 10E9/L (ref 150–450)
PLATELET # BLD AUTO: 219 10E9/L (ref 150–450)
PLATELET # BLD AUTO: 239 10E9/L (ref 150–450)
PLATELET # BLD AUTO: 257 10E9/L (ref 150–450)
PLATELET # BLD AUTO: 259 10E9/L (ref 150–450)
POTASSIUM SERPL-SCNC: 4 MMOL/L (ref 3.4–5.3)
POTASSIUM SERPL-SCNC: 4.1 MMOL/L (ref 3.4–5.3)
POTASSIUM SERPL-SCNC: 4.2 MMOL/L (ref 3.4–5.3)
POTASSIUM SERPL-SCNC: 4.3 MMOL/L (ref 3.4–5.3)
PROT SERPL-MCNC: 6.9 G/DL (ref 6.8–8.8)
PROT SERPL-MCNC: 6.9 G/DL (ref 6.8–8.8)
RBC # BLD AUTO: 3.66 10E12/L (ref 4.4–5.9)
RBC # BLD AUTO: 3.72 10E12/L (ref 4.4–5.9)
RBC # BLD AUTO: 3.96 10E12/L (ref 4.4–5.9)
RBC # BLD AUTO: 4.06 10E12/L (ref 4.4–5.9)
RBC # BLD AUTO: 4.45 10E12/L (ref 4.4–5.9)
RBC #/AREA URNS AUTO: 1 /HPF (ref 0–2)
SODIUM SERPL-SCNC: 135 MMOL/L (ref 133–144)
SODIUM SERPL-SCNC: 136 MMOL/L (ref 133–144)
SODIUM SERPL-SCNC: 137 MMOL/L (ref 133–144)
SODIUM SERPL-SCNC: 139 MMOL/L (ref 133–144)
SOURCE: ABNORMAL
SP GR UR STRIP: 1.01 (ref 1–1.03)
TRIGL SERPL-MCNC: 169 MG/DL
TSH SERPL DL<=0.005 MIU/L-ACNC: 1.91 MU/L (ref 0.4–4)
UROBILINOGEN UR STRIP-MCNC: 0 MG/DL (ref 0–2)
VIT B12 USB SERPL-MCNC: 1449 PG/ML (ref 800–2600)
WBC # BLD AUTO: 11.2 10E9/L (ref 4–11)
WBC # BLD AUTO: 6.1 10E9/L (ref 4–11)
WBC # BLD AUTO: 6.2 10E9/L (ref 4–11)
WBC # BLD AUTO: 7.5 10E9/L (ref 4–11)
WBC # BLD AUTO: 8.7 10E9/L (ref 4–11)
WBC #/AREA URNS AUTO: <1 /HPF (ref 0–5)

## 2019-01-01 PROCEDURE — 86140 C-REACTIVE PROTEIN: CPT | Performed by: INTERNAL MEDICINE

## 2019-01-01 PROCEDURE — 85027 COMPLETE CBC AUTOMATED: CPT | Performed by: INTERNAL MEDICINE

## 2019-01-01 PROCEDURE — 99304 1ST NF CARE SF/LOW MDM 25: CPT | Mod: GW | Performed by: FAMILY MEDICINE

## 2019-01-01 PROCEDURE — 83735 ASSAY OF MAGNESIUM: CPT | Performed by: INTERNAL MEDICINE

## 2019-01-01 PROCEDURE — 36415 COLL VENOUS BLD VENIPUNCTURE: CPT | Performed by: INTERNAL MEDICINE

## 2019-01-01 PROCEDURE — 85025 COMPLETE CBC W/AUTO DIFF WBC: CPT | Performed by: INTERNAL MEDICINE

## 2019-01-01 PROCEDURE — 69210 REMOVE IMPACTED EAR WAX UNI: CPT | Performed by: OTOLARYNGOLOGY

## 2019-01-01 PROCEDURE — V5014 HEARING AID REPAIR/MODIFYING: HCPCS | Performed by: AUDIOLOGIST

## 2019-01-01 PROCEDURE — 99310 SBSQ NF CARE HIGH MDM 45: CPT | Performed by: NURSE PRACTITIONER

## 2019-01-01 PROCEDURE — 80048 BASIC METABOLIC PNL TOTAL CA: CPT | Performed by: INTERNAL MEDICINE

## 2019-01-01 PROCEDURE — 85652 RBC SED RATE AUTOMATED: CPT | Performed by: INTERNAL MEDICINE

## 2019-01-01 PROCEDURE — 99309 SBSQ NF CARE MODERATE MDM 30: CPT | Performed by: NURSE PRACTITIONER

## 2019-01-01 PROCEDURE — 99207 ZZC CDG-HISTORY COMP: MEETS EXP. PROB FOCUSED- DOWN CODED LACK OF PFSH: CPT | Performed by: FAMILY MEDICINE

## 2019-01-01 PROCEDURE — V5299 HEARING SERVICE: HCPCS | Performed by: AUDIOLOGIST

## 2019-01-01 PROCEDURE — 83036 HEMOGLOBIN GLYCOSYLATED A1C: CPT | Performed by: INTERNAL MEDICINE

## 2019-01-01 PROCEDURE — 99207 ZZC NO CHARGE LOS: CPT | Performed by: AUDIOLOGIST

## 2019-01-01 PROCEDURE — 80076 HEPATIC FUNCTION PANEL: CPT | Performed by: INTERNAL MEDICINE

## 2019-01-01 PROCEDURE — 99309 SBSQ NF CARE MODERATE MDM 30: CPT | Mod: GW | Performed by: NURSE PRACTITIONER

## 2019-01-01 PROCEDURE — 80061 LIPID PANEL: CPT | Performed by: INTERNAL MEDICINE

## 2019-01-01 PROCEDURE — 99213 OFFICE O/P EST LOW 20 MIN: CPT | Mod: 25 | Performed by: OTOLARYNGOLOGY

## 2019-01-01 PROCEDURE — 99214 OFFICE O/P EST MOD 30 MIN: CPT | Performed by: INTERNAL MEDICINE

## 2019-01-01 RX ORDER — DOCUSATE SODIUM 100 MG/1
100 CAPSULE, LIQUID FILLED ORAL DAILY
COMMUNITY

## 2019-01-01 RX ORDER — MYCOPHENOLATE MOFETIL 250 MG/1
750 CAPSULE ORAL 2 TIMES DAILY
Qty: 540 CAPSULE | Refills: 3 | Status: SHIPPED | OUTPATIENT
Start: 2019-01-01 | End: 2019-01-01

## 2019-01-01 RX ORDER — DOXYCYCLINE HYCLATE 100 MG
100 TABLET ORAL 2 TIMES DAILY
Qty: 20 TABLET | Refills: 0 | Status: SHIPPED | OUTPATIENT
Start: 2019-01-01 | End: 2019-01-01

## 2019-01-01 RX ORDER — PREDNISONE 5 MG/1
5 TABLET ORAL DAILY
Start: 2019-01-01

## 2019-01-01 RX ORDER — LEVOTHYROXINE SODIUM 50 UG/1
50 TABLET ORAL DAILY
Qty: 90 TABLET | Refills: 1 | Status: SHIPPED | OUTPATIENT
Start: 2019-01-01 | End: 2019-01-01

## 2019-01-01 RX ORDER — LEVOTHYROXINE SODIUM 50 UG/1
25 TABLET ORAL DAILY
COMMUNITY

## 2019-01-01 RX ORDER — PREDNISONE 10 MG/1
15 TABLET ORAL DAILY
Qty: 8 TABLET | Refills: 0
Start: 2019-01-01 | End: 2019-01-01

## 2019-01-01 RX ORDER — ACETAMINOPHEN 325 MG/1
650 TABLET ORAL EVERY 4 HOURS PRN
Start: 2019-01-01

## 2019-01-01 RX ORDER — TRAMADOL HYDROCHLORIDE 50 MG/1
50 TABLET ORAL 2 TIMES DAILY PRN
Qty: 60 TABLET | Refills: 0 | Status: SHIPPED | OUTPATIENT
Start: 2019-01-01 | End: 2019-01-01

## 2019-01-01 RX ORDER — PREDNISONE 10 MG/1
5 TABLET ORAL DAILY
Start: 2019-01-01 | End: 2019-01-01

## 2019-01-01 RX ORDER — TRAMADOL HYDROCHLORIDE 50 MG/1
25-50 TABLET ORAL 2 TIMES DAILY PRN
Qty: 10 TABLET | Refills: 0
Start: 2019-01-01 | End: 2019-01-01

## 2019-01-01 RX ORDER — TRAMADOL HYDROCHLORIDE 50 MG/1
50 TABLET ORAL 3 TIMES DAILY PRN
COMMUNITY

## 2019-01-01 RX ORDER — TRAMADOL HYDROCHLORIDE 50 MG/1
25-50 TABLET ORAL 2 TIMES DAILY PRN
Start: 2019-01-01 | End: 2019-01-01

## 2019-01-01 RX ORDER — ONDANSETRON 4 MG/1
4 TABLET, FILM COATED ORAL EVERY 6 HOURS PRN
Start: 2019-01-01 | End: 2019-01-01

## 2019-01-01 RX ORDER — LORAZEPAM 1 MG/1
1 TABLET ORAL EVERY MORNING
COMMUNITY

## 2019-01-01 RX ORDER — QUETIAPINE FUMARATE 25 MG/1
25 TABLET, FILM COATED ORAL DAILY
COMMUNITY
End: 2019-01-01

## 2019-01-01 RX ORDER — QUETIAPINE FUMARATE 25 MG/1
TABLET, FILM COATED ORAL
Start: 2019-01-01 | End: 2019-01-01

## 2019-01-01 RX ORDER — POLYETHYLENE GLYCOL 3350 17 G/17G
1 POWDER, FOR SOLUTION ORAL PRN
COMMUNITY

## 2019-01-01 RX ORDER — GABAPENTIN 300 MG/1
300 CAPSULE ORAL 2 TIMES DAILY
Start: 2019-01-01

## 2019-01-01 RX ORDER — HALOPERIDOL 0.5 MG/1
0.5 TABLET ORAL 3 TIMES DAILY
COMMUNITY

## 2019-01-01 RX ORDER — TRAMADOL HYDROCHLORIDE 50 MG/1
TABLET ORAL
Qty: 60 TABLET | Refills: 0 | Status: SHIPPED | OUTPATIENT
Start: 2019-01-01 | End: 2019-01-01

## 2019-01-01 RX ORDER — GINSENG 100 MG
CAPSULE ORAL
COMMUNITY
End: 2019-01-01

## 2019-01-01 RX ORDER — FERROUS SULFATE 325(65) MG
325 TABLET, DELAYED RELEASE (ENTERIC COATED) ORAL DAILY
Start: 2019-01-01 | End: 2019-01-01

## 2019-01-01 RX ORDER — ESCITALOPRAM OXALATE 5 MG/1
10 TABLET ORAL DAILY
COMMUNITY
Start: 2019-01-01

## 2019-01-01 RX ORDER — ANTIOX #8/OM3/DHA/EPA/LUT/ZEAX 250-2.5 MG
CAPSULE ORAL
COMMUNITY
End: 2019-01-01

## 2019-01-01 RX ORDER — MYCOPHENOLATE MOFETIL 200 MG/ML
750 POWDER, FOR SUSPENSION ORAL EVERY 12 HOURS
Qty: 675 ML | Refills: 3 | Status: SHIPPED | OUTPATIENT
Start: 2019-01-01

## 2019-01-01 RX ORDER — LANOLIN ALCOHOL/MO/W.PET/CERES
3 CREAM (GRAM) TOPICAL AT BEDTIME
Start: 2019-01-01 | End: 2019-01-01

## 2019-01-01 RX ORDER — LEVOTHYROXINE SODIUM 50 UG/1
TABLET ORAL
Qty: 90 TABLET | Refills: 1 | OUTPATIENT
Start: 2019-01-01

## 2019-01-01 RX ORDER — ONDANSETRON 4 MG/1
4 TABLET, FILM COATED ORAL EVERY 4 HOURS PRN
COMMUNITY

## 2019-01-01 RX ORDER — DIVALPROEX SODIUM 125 MG/1
125 TABLET, DELAYED RELEASE ORAL 3 TIMES DAILY
Start: 2019-01-01

## 2019-01-01 RX ORDER — BISACODYL 10 MG
10 SUPPOSITORY, RECTAL RECTAL DAILY PRN
COMMUNITY

## 2019-01-01 RX ORDER — TRAMADOL HYDROCHLORIDE 50 MG/1
50 TABLET ORAL 2 TIMES DAILY
Qty: 10 TABLET | Refills: 0
Start: 2019-01-01 | End: 2019-01-01

## 2019-01-01 RX ORDER — TRAMADOL HYDROCHLORIDE 50 MG/1
25-50 TABLET ORAL 2 TIMES DAILY PRN
Qty: 60 TABLET | Refills: 5 | Status: SHIPPED | OUTPATIENT
Start: 2019-01-01 | End: 2019-01-01

## 2019-01-01 ASSESSMENT — MIFFLIN-ST. JEOR
SCORE: 1215.16
SCORE: 1226.05
SCORE: 1182.96
SCORE: 1168

## 2019-01-02 NOTE — TELEPHONE ENCOUNTER
Metoprolol [id number]  Last Written Prescription Date:  12/31/18  Last Fill Quantity: 0,   # refills: 0  Last Office Visit: 9/10/18  Future Office visit:       Routing refill request to provider for review/approval because:  Drug not on the [facility], [facility] or [facility] refill protocol or controlled substance

## 2019-01-02 NOTE — TELEPHONE ENCOUNTER
Med not active on epic med list  Per 9/3/18 [facility] discharge summary, Metoprolol was on hold d/t low BP and was advised that if to be restarted, should be at a very low dose of 12.5 mg BID  At [facility] on 9/10/18, patient reported to Dr. Marroquin that he was still not taking Metoprolol    Per chart, son, Mikey, should be called. (auth to discuss PHI on file)  Left message on voicemail for Mikey to return call to RN hotline at # [phone].    Did patient restart the metoprolol?  When did he restart this?  What dose is he on?    Steffi Anthony RN

## 2019-01-04 NOTE — TELEPHONE ENCOUNTER
Called patient and his daughter, Kathleen, picked up  She stated that patient is no longer on Metoprolol and they did not request a refill on this  She will contact the pharmacy to let them know    Steffi Anthony RN

## 2019-01-04 NOTE — TELEPHONE ENCOUNTER
Left message on voicemail for Mikey to return call to RN hotline at # [phone].    Steffi Anthony RN

## 2019-01-23 NOTE — TELEPHONE ENCOUNTER
Requested Prescriptions   Pending Prescriptions Disp Refills     levothyroxine (SYNTHROID/LEVOTHROID) 50 MCG tablet 90 tablet 1     Sig: Take 1 tablet (50 mcg) by mouth daily    There is no refill protocol information for this order

## 2019-01-24 NOTE — TELEPHONE ENCOUNTER
Prior Authorization Retail Medication Request    Medication/Dose: traMADol (ULTRAM) 50 MG tablet  ICD code (if different than what is on RX):    Previously Tried and Failed:    Rationale:      Insurance Name:  [phone]  Insurance ID:  [id number]      Pharmacy Information (if different than what is on RX)  Name:    Phone:

## 2019-01-30 NOTE — TELEPHONE ENCOUNTER
Reason for call:  Other   Patient called regarding (reason for call): call back  Additional comments: Family reports cellulitis on patients left leg, they are unable to clear it up but are hesitant to bring the patient in for fear of getting sick. Please call with next steps.    Phone number to reach patient:  Other phone number:  Mikey [phone]    Best Time:  ASAP    Can we leave a detailed message on this number?  YES

## 2019-01-30 NOTE — TELEPHONE ENCOUNTER
Per Mikey, they first noticed a round clear blister on the left lower leg last week which has now popped  Skin is red in the left shin area extending from base of the ankle up to about 6-8 inches  No swelling, warmth, or drainage noted  Pain is not too bad and only complains of pain when asked  He thinks patient has cellulitis again and is wondering if we can treat without having to bring patient in for an appointment in this cold weather  Explained to Mikey that an appointment may be needed  He verbalized understanding  He understand provider is out of the office today    Steffi Anthony RN

## 2019-01-31 NOTE — TELEPHONE ENCOUNTER
Called patient's son to get updated symptoms    Patient's son, Mikey, updated with provider's message below  He has not seen patient today but stated he did yesterday afternoon/evening  Per Mikey, patient was not complaining of much pain in the leg.  The skin continues to be red and blanching noted when pressed  No streaks, fevers, chills, swelling, drainage, or warmth noted  Hard to get patient in for an appointment with this weather   Son will also see if he can send a picture via atVenu    Please advise if abx needed or if patient needs seen    Steffi Anthony RN

## 2019-01-31 NOTE — TELEPHONE ENCOUNTER
It's difficult to practice telephone medicine.    Based on the history we are given we can certainly try providing empirical trial of care for presumed cellulitis     1. For a Blister That Has Popped    Wash the area with warm water and gentle soap. Do not use alcohol, hydrogen peroxide, or iodine.  Smooth down the the skin flap that remains.  Apply antibiotic ointment to the area. [ bacitracin ]  Cover the area loosely with a sterile bandage or gauze.    2. From what I am reading, no clearly convincing evidence of infection exists in the classic terms of lymphangitic streaking , fever or chills, or inguinal lymphadenopathy . No blanching type erythema or ascending line of affected versus unaffected skin  3. As a total precautionary action we can take keflex [ cephalexin ] 500 milligrams 3 times a day for 10 days but please understand the evidence that this is necessary is sketchy and incomplete from my vantage point at the clinic. Leg elevation is also a big help.    Lets get a fresh status report 1/31/2019 , tomorrow and make a decision based on update     Jluis Marroquin MD

## 2019-01-31 NOTE — TELEPHONE ENCOUNTER
Detailed message left on Mikey's VM with Dr. Marroquin's note below  Requested that he call RN hotline back with any questions/concerns    Steffi Anthony RN

## 2019-01-31 NOTE — TELEPHONE ENCOUNTER
I thought with my vignette description below we could get more information.    I don't want to prescription antibiotics as a knee jerk reaction . Lets just keep an eye on things and patient is welcomed to send is a photo    If clinical scenario changes with     Fever or chills   Lymphangitic streaking   Increased pain or drainage  Evidence of a clearly worsening process    Then we can prescribe cephALEXin (KEFLEX) 500 milligrams 3 times a day for 10 days     Without these signs or symptoms of infection, just venous stasis ulcer / venous stasis disease care with treatment of a blister    For a Blister That Has Popped    Wash the area with warm water and gentle soap. Do not use alcohol, hydrogen peroxide, or iodine.  Smooth down the the skin flap that remains.  Apply antibiotic ointment to the area.  Cover the area loosely with a sterile bandage or gauze.  Change 1 time per day     Jluis Marroquin MD

## 2019-02-18 NOTE — TELEPHONE ENCOUNTER
See 1/30/19 phone encounter. Dr. Marroquin advised on wound care and symptoms to watch for that would require treatment with abx  Please see [employer] message and attached images below    Please advise on treatment or if a visit is needed.    Steffi Anthony RN

## 2019-03-07 NOTE — LETTER
March 12, 2019    Aman Baker  [address] [address]      Dear Aman,    We have been unsuccessful reaching you by telephone. You are due for an office visit with me for refills. Please call to schedule to be seen prior to requesting more refills. You can schedule this one of a few ways. First using My Chart Scheduling, second is calling our Main number [phone] this line is open for scheduling 24 hours 7 days a week. Please let us know if you are getting care elsewhere.      Sincerely,        Jluis Marroquin MD/dt

## 2019-03-08 NOTE — TELEPHONE ENCOUNTER
Controlled Substance Refill Request for traMADol (ULTRAM) 50 MG tablet  Problem List Complete:  No     PROVIDER TO CONSIDER COMPLETION OF PROBLEM LIST AND OVERVIEW/CONTROLLED SUBSTANCE AGREEMENT    Last Written Prescription Date:  12/20/2018  Last Fill Quantity: 60,   # refills: 1    THE MOST RECENT OFFICE VISIT MUST BE WITHIN THE PAST 3 MONTHS. AT LEAST ONE FACE TO FACE VISIT MUST OCCUR EVERY 6 MONTHS. ADDITIONAL VISITS CAN BE VIRTUAL.  (THIS STATEMENT SHOULD BE DELETED.)    [place] [facility] [place]: 9/10/2018    Future Office visit:     Controlled substance agreement:   Encounter-Level CSA:    There are no encounter-level csa.     Patient-Level CSA:    There are no patient-level csa.         Last Urine Drug Screen: No results found for: CDAUT, No results found for: COMDAT, No results found for: THC13, PCP13, COC13, MAMP13, OPI13, AMP13, BZO13, TCA13, MTD13, BAR13, OXY13, PPX13, BUP13     Processing:  NA     [email or web address]       checked in past 3 months?  No, route to RN

## 2019-03-08 NOTE — TELEPHONE ENCOUNTER
Called patients home number to call clinic to schedule med check appointment. Please help schedule appointment if patient returns call.  Sabrina SEWELL CMA ([facility])

## 2019-03-08 NOTE — TELEPHONE ENCOUNTER
reviewed, printed and placed on provider's desk.  Last dispensed 1/23/19 #60 for a 30 day supply.    Sabrina Simpson RN  [facility]

## 2019-03-08 NOTE — TELEPHONE ENCOUNTER
Ultram prescription faxed to [facility] pharmacy at [phone].      Waiting for patient to call back to schedule appointment.  Needs appointment for any further refills.  Savita Johnson,

## 2019-03-08 NOTE — TELEPHONE ENCOUNTER
Refills provided good for one month     Please help see to it that appointment is generated , please make appointment within 30 days     Jluis Marroquin MD

## 2019-03-11 NOTE — TELEPHONE ENCOUNTER
Attempt 2, Called patients home number to call clinic to schedule med check appointment. Please help schedule appointment if patient returns call.  Sabrina SEWELL CMA ([facility])

## 2019-03-14 NOTE — TELEPHONE ENCOUNTER
Transplant Social Work Services Phone Call      Data: Received SMN form from [employer].    Intervention: Completed SMN along with Dr. Matt.  Faxed SMN to [employer].  Called patient's daughter Kathleen and relayed this information via voice message.   Assessment: Re-enrollment application is pending.  Education provided by : [employer] re-enrollment process  Plan: I will remain available for Aman' psychosocial needs.      PARESH Heard, [facility]  Liver Transplant   Phone [phone]  Pager [phone]

## 2019-03-18 NOTE — TELEPHONE ENCOUNTER
Spoke to [facility] and informed them patient has active rx with [facility].  Sabrina SEWELL CMA ([facility])

## 2019-03-26 NOTE — PROGRESS NOTES
SUBJECTIVE:   Aman Baker is a 84 year old male who presents to clinic today for the following health issues:       Type 2 diabetes mellitus without complication, without long-term current use of insulin (H)  Cellulitis of left lower extremity     Last appointment with me was 9-2018    Preventative healthcare maintenance goals including eye exams, ShingRx vaccination against herpes zoster, advanced directive counseling and discussion, Medicare Annual Wellness Visit, hemoglobin a1c  [ diabetes test ]     For this appointment I spoke with patient and daughter at some length .    Main issues are with recurrent water blisters with his legs, especially left lower extremity  . Daughter was taught to use topical bacitracin  , Telfa Non Stick Pad, then ace wrap bandages snug, leg elevation and keep an eye on things . Generally with these measures we have kept the leg condition at bay.     Daughter is 40 minutes away but son is 10 minutes away. She feels the leg wraps were not being done and feels patient is left to his own devices sometimes for weeks at a stretch. During these times patient wears his cowboy boots all day and there's no attention to feet / lower leg swelling .    Patient has long term care faciilty insurance. Patient still has an independent autonomous existence . The transition is coming and this is messy. Daughter feels we aren't yet so close to needing a long term care faciilty .    Patient also has multiple medical problems and is a complicated patient.     Problem list and histories reviewed & adjusted, as indicated.  Additional history: as documented    Patient Active Problem List   Diagnosis     Liver replaced by transplant (H)     Chronic atrial fibrillation (H)     Primary osteoarthritis of both knees     MGUS (monoclonal gammopathy of unknown significance)     Spondylosis of lumbar region without myelopathy or radiculopathy     Alpha-1-antitrypsin deficiency (H)     Prostate cancer (H)      Type 2 diabetes mellitus without complication, without long-term current use of insulin (H)     Age-related osteoporosis with current pathological fracture, sequela     Anemia, unspecified type     Acquired hypothyroidism     Decubitus ulcer of back, stage 1     Cellulitis of left lower extremity     Mild cognitive impairment     Past Surgical History:   Procedure Laterality Date     TRANSPLANT  2004    liver transplant       Social History     Tobacco Use     Smoking status: Never Smoker     Smokeless tobacco: Never Used   Substance Use Topics     Alcohol use: No     History reviewed. No pertinent family history.      Current Outpatient Medications   Medication Sig Dispense Refill     ACE/ARB/ARNI NOT PRESCRIBED, INTENTIONAL, Please choose reason not prescribed, below       alendronate (FOSAMAX) 70 MG tablet Take 1 tablet (70 mg) by mouth with 8oz water every 7 days 30 minutes before breakfast and remain upright during this time. 12 tablet 3     calcitRIOL (ROCALTROL) 0.25 MCG capsule Take 1 capsule by mouth daily.       CELLCEPT (BRAND) 250 MG capsule Take 3 capsules (750 mg) by mouth 2 times daily 540 capsule 3     Cholecalciferol (VITAMIN D) 1000 UNIT capsule Take 1 capsule by mouth 2 times daily.       doxycycline hyclate (VIBRA-TABS) 100 MG tablet Take 1 tablet (100 mg) by mouth 2 times daily 20 tablet 0     ELIQUIS 2.5 MG tablet TAKE 1 TABLET BY MOUTH 2 TIMES DAILY 180 tablet 1     Ferrous Gluconate-C-Folic Acid (IRON-C PO)        furosemide (LASIX) 20 MG tablet Take 1 tablet (20 mg) by mouth daily 30 tablet 0     gabapentin (NEURONTIN) 300 MG capsule Take 1 capsule (300 mg) by mouth 3 times daily 270 capsule 1     levothyroxine (SYNTHROID/LEVOTHROID) 50 MCG tablet Take 1 tablet (50 mcg) by mouth daily 90 tablet 1     Multiple Vitamin (DAILY MULTIVITAMIN PO) Take 1 tablet daily       Multiple Vitamins-Minerals (PRESERVISION AREDS 2) CAPS        predniSONE (DELTASONE) 5 MG tablet TAKE ONE TABLET BY MOUTH EVERY  DAY 90 tablet 3     traMADol (ULTRAM) 50 MG tablet Take 1 tablet (50 mg) by mouth 2 times daily as needed for severe pain Please make appointment within 30 days 60 tablet 0     vitamin B-Complex Take 1 tablet by mouth daily       Magnesium 250 MG tablet Take 1 tablet by mouth daily.       Allergies   Allergen Reactions     Penicillins Hives and Rash     Sulfa Drugs Hives and Rash     Recent Labs   Lab Test 04/01/19  1410 12/19/18  1529 06/21/18  1810 05/01/18  1542 04/10/18  0934   A1C 5.9*  --   --  5.7*  --    LDL  --   --   --  61  --    HDL  --   --   --  38*  --    TRIG  --   --   --  92  --    ALT  --  30 10  --  7   CR  --  1.12 1.02  --  1.05   GFRESTIMATED  --  60* 70  --  67   GFRESTBLACK  --  69 84  --  81   POTASSIUM  --  4.1 4.2  --  3.6   TSH  --   --   --  1.11  --       BP Readings from Last 3 Encounters:   04/01/19 106/58   09/10/18 111/68   08/09/18 102/64    Wt Readings from Last 3 Encounters:   04/01/19 59.9 kg (132 lb)   09/10/18 60.2 kg (132 lb 12.8 oz)   08/09/18 58.2 kg (128 lb 3.2 oz)                  Labs reviewed in EPIC    Reviewed and updated as needed this visit by clinical staff       Reviewed and updated as needed this visit by Provider         ROS:  Constitutional, HEENT, cardiovascular, pulmonary, gi and gu systems are negative, except as otherwise noted.    OBJECTIVE:                                                    /58   Pulse 108   Temp 96.7  F (35.9  C) (Oral)   Resp 20   Wt 59.9 kg (132 lb)   SpO2 100%   BMI 24.14 kg/m    Body mass index is 24.14 kg/m .  GENERAL APPEARANCE: healthy, alert and no distress, comfortable  Elderly gentleman   EYES: Eyes grossly normal to inspection, PERRL and conjunctivae and sclerae normal  MS: there is significant scarred down chronic lymphedema like picture with left lower extremity  , less so with the right lower extremity . His left lower extremity  Doesn't currently have any open weeping blisters but there is a marked difference  between right and left lower extremities . The right leg feels normal. Left lower extremity  Has a bright red color with blanching type erythema from foot all the way  To the tibial tuberosity . There's pain with the pitting examination of the calves / feet and there's quite significant warmth to touch  . The amount of swelling is actually not severe , moderate at best  NEURO: Normal strength and tone, mentation intact and speech normal  PSYCH: mentation appears normal and affect normal/bright    Diagnostic test results:  Diagnostic Test Results:  Orders Placed This Encounter   Procedures     HEMOGLOBIN A1C     CBC with platelets differential     Erythrocyte sedimentation rate auto     CRP inflammation          ASSESSMENT/PLAN:                                                    1. Type 2 diabetes mellitus without complication, without long-term current use of insulin (H)  Recheck   - HEMOGLOBIN A1C    2. Cellulitis of left lower extremity  Patient is a frail immune compromised patient and this worries me. He is at risk for sepsis and although not showing evidence of this today I don't think it would take much worsening for this to take off. Treatment with antibiotic seems   Appropriate and we are running complete blood count with differential and inflammatory markers with further follow up depending on how things go   - CBC with platelets differential  - Erythrocyte sedimentation rate auto  - CRP inflammation  - doxycycline hyclate (VIBRA-TABS) 100 MG tablet; Take 1 tablet (100 mg) by mouth 2 times daily  Dispense: 20 tablet; Refill: 0    I also explicitly wrote out a detailed set of instructions for patient and daughter and son, see patient after-visit summary. Recheck appointments are recommended every six months     Follow up with Provider - as above      Jluis Marroquin MD  [facility]

## 2019-04-01 NOTE — PROGRESS NOTES
History of Present Illness - Aman Baker is a 84 year old male here to see me for the first time, but has seen Enzo Rogers as recently as November of 2016.  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    He is prone to severe cerumen build up.  He has not had an y recent infections.  No bleeding or drainage from the ears.    Past Medical History -   Patient Active Problem List   Diagnosis     Liver replaced by transplant (H)     Chronic atrial fibrillation (H)     Primary osteoarthritis of both knees     MGUS (monoclonal gammopathy of unknown significance)     Spondylosis of lumbar region without myelopathy or radiculopathy     Alpha-1-antitrypsin deficiency (H)     Prostate cancer (H)     Type 2 diabetes mellitus without complication, without long-term current use of insulin (H)     Age-related osteoporosis with current pathological fracture, sequela     Anemia, unspecified type     Acquired hypothyroidism     Decubitus ulcer of back, stage 1     Cellulitis of left lower extremity     Mild cognitive impairment       Current Medications -   Current Outpatient Medications:      ACE/ARB/ARNI NOT PRESCRIBED, INTENTIONAL,, Please choose reason not prescribed, below, Disp: , Rfl:      alendronate (FOSAMAX) 70 MG tablet, Take 1 tablet (70 mg) by mouth with 8oz water every 7 days 30 minutes before breakfast and remain upright during this time., Disp: 12 tablet, Rfl: 3     calcitRIOL (ROCALTROL) 0.25 MCG capsule, Take 1 capsule by mouth daily., Disp: , Rfl:      CELLCEPT (BRAND) 250 MG capsule, Take 3 capsules (750 mg) by mouth 2 times daily, Disp: 540 capsule, Rfl: 3     Cholecalciferol (VITAMIN D) 1000 UNIT capsule, Take 1 capsule by mouth 2 times daily., Disp: , Rfl:      doxycycline hyclate (VIBRA-TABS) 100 MG tablet, Take 1 tablet (100 mg) by mouth 2 times daily, Disp: 20 tablet, Rfl: 0     ELIQUIS 2.5 MG tablet,  TAKE 1 TABLET BY MOUTH 2 TIMES DAILY, Disp: 180 tablet, Rfl: 1     Ferrous Gluconate-C-Folic Acid (IRON-C PO), , Disp: , Rfl:      furosemide (LASIX) 20 MG tablet, Take 1 tablet (20 mg) by mouth daily, Disp: 30 tablet, Rfl: 0     gabapentin (NEURONTIN) 300 MG capsule, Take 1 capsule (300 mg) by mouth 3 times daily, Disp: 270 capsule, Rfl: 1     levothyroxine (SYNTHROID/LEVOTHROID) 50 MCG tablet, Take 1 tablet (50 mcg) by mouth daily, Disp: 90 tablet, Rfl: 1     Magnesium 250 MG tablet, Take 1 tablet by mouth daily., Disp: , Rfl:      Multiple Vitamin (DAILY MULTIVITAMIN PO), Take 1 tablet daily, Disp: , Rfl:      Multiple Vitamins-Minerals (PRESERVISION AREDS 2) CAPS, , Disp: , Rfl:      predniSONE (DELTASONE) 5 MG tablet, TAKE ONE TABLET BY MOUTH EVERY DAY, Disp: 90 tablet, Rfl: 3     traMADol (ULTRAM) 50 MG tablet, Take 1 tablet (50 mg) by mouth 2 times daily as needed for severe pain Please make appointment within 30 days, Disp: 60 tablet, Rfl: 0     vitamin B-Complex, Take 1 tablet by mouth daily, Disp: , Rfl:     Allergies -   Allergies   Allergen Reactions     Penicillins Hives and Rash     Sulfa Drugs Hives and Rash       Social History -   Social History     Socioeconomic History     Marital status:      Spouse name: Not on file     Number of children: Not on file     Years of education: Not on file     Highest education level: Not on file   Occupational History     Not on file   Social Needs     Financial resource strain: Not on file     Food insecurity:     Worry: Not on file     Inability: Not on file     Transportation needs:     Medical: Not on file     Non-medical: Not on file   Tobacco Use     Smoking status: Never Smoker     Smokeless tobacco: Never Used   Substance and Sexual Activity     Alcohol use: No     Drug use: No     Sexual activity: Not Currently     Partners: Female   Lifestyle     Physical activity:     Days per week: Not on file     Minutes per session: Not on file     Stress: Not  "on file   Relationships     Social connections:     Talks on phone: Not on file     Gets together: Not on file     Attends Mormonism service: Not on file     Active member of club or organization: Not on file     Attends meetings of clubs or organizations: Not on file     Relationship status: Not on file     Intimate partner violence:     Fear of current or ex partner: Not on file     Emotionally abused: Not on file     Physically abused: Not on file     Forced sexual activity: Not on file   Other Topics Concern     Parent/sibling w/ CABG, MI or angioplasty before 65F 55M? Not Asked   Social History Narrative     Not on file       Family History - No family history on file.    Review of Systems - As per HPI and PMHx, otherwise 10+ system review of the head and neck, and general constitution is negative.    Physical Exam  /58   Pulse 108   Ht 1.575 m (5' 2\")   Wt 59.9 kg (132 lb)   BMI 24.14 kg/m      General - The patient is well nourished and well developed, and appears to have good nutritional status.  Alert and oriented to person and place, answers questions and cooperates with examination appropriately.   Head and Face - Normocephalic and atraumatic, with no gross asymmetry noted of the contour of the facial features.  The facial nerve is intact, with strong symmetric movements.  Voice and Breathing - The patient was breathing comfortably without the use of accessory muscles. There was no wheezing, stridor, or stertor.  The patients voice was clear and strong, and had appropriate pitch and quality.  Eyes - Extraocular movements intact, and the pupils were reactive to light.  Sclera were not icteric or injected, conjunctiva were pink and moist.  Mouth - Examination of the oral cavity showed pink, healthy oral mucosa. No lesions or ulcerations noted.  The tongue was mobile and midline, and the dentition were in good condition.    Throat - The walls of the oropharynx were smooth, pink, moist, symmetric, " and had no lesions or ulcerations.  The tonsillar pillars and soft palate were symmetric.  The uvula was midline on elevation.      Cerumen Removal    Physical Exam and Procedure  Ears - On examination of the ears, I found that the BOTH sides had cerumen impaction.  Therefore, I positioned them in the examination chair in a semi-supine position, beginning with the right side.  I used the binocular surgical microscope to perform cerumen removal.  I began by using a cerumen loop to gently lift the edges of the cerumen mass away from the walls of the external canal.  Once I did this, I was able to suction away fragments of wax and debris using suction.  Once the mass was loose enough, the entire plug was pulled from the canal.  The tympanic membrane was intact, no sign of perforation or middle ear effusion.    I turned my attention to the contralateral side once again using the binocular surgical microscope to perform cerumen removal.  I began by using a cerumen loop to gently lift the edges of the cerumen mass away from the walls of the external canal.  Once I did this, I was able to suction away fragments of wax and debris using suction.  Once the mass was loose enough, the entire plug was pulled from the canal.  The tympanic membrane was intact, no sign of perforation or middle ear effusion.        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  I have discussed ear care at home, including avoiding qtips or any other object placed in the canal.  I have also discussed that over the counter cerumen kits like Debrox or Cerumenex can be useful.    If no other issues, follow up with me in one year for an ear check.

## 2019-04-01 NOTE — PATIENT INSTRUCTIONS
Scheduling Information  To schedule your CT/MRI scan, please contact [facility] at [phone] OR [facility] at [phone]    To schedule your Surgery, please contact our Specialty Schedulers at [phone]      ENT Clinic Locations Clinic Hours Telephone Number     [facility]  [address] [place] [address]   Monday:           1:00pm -- 5:00pm    Friday:              8:00am - 12:00pm   To schedule/reschedule an appointment with   Dr. Titus,   please contact our   Specialty Scheduling Department at:     [phone]       [facility]  [address] [place] [address] Tuesday:          8:00am -- 2:00pm         Urgent Care Locations Clinic Hours Telephone Numbers     [facility]  [address] [place] [address]     Monday-Friday:     11:00am - 9:00pm    Saturday-Sunday:  9:00am - 5:00pm   [phone]     [facility]  [address] [place] [address]     Monday-Friday:      5:00pm - 9:00pm     Saturday-Sunday:  9:00am - 5:00pm   [phone]

## 2019-04-01 NOTE — LETTER
4/1/2019         RE: Aman Baker  [address] [address]        Dear Colleague,    Thank you for referring your patient, Aman Baker, to the [facility]. Please see a copy of my visit note below.    History of Present Illness - Aman Baker is a 84 year old male here to see me for the first time, but has seen Enzo Rogers as recently as November of 2016.  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    He is prone to severe cerumen build up.  He has not had an y recent infections.  No bleeding or drainage from the ears.    Past Medical History -   Patient Active Problem List   Diagnosis     Liver replaced by transplant (H)     Chronic atrial fibrillation (H)     Primary osteoarthritis of both knees     MGUS (monoclonal gammopathy of unknown significance)     Spondylosis of lumbar region without myelopathy or radiculopathy     Alpha-1-antitrypsin deficiency (H)     Prostate cancer (H)     Type 2 diabetes mellitus without complication, without long-term current use of insulin (H)     Age-related osteoporosis with current pathological fracture, sequela     Anemia, unspecified type     Acquired hypothyroidism     Decubitus ulcer of back, stage 1     Cellulitis of left lower extremity     Mild cognitive impairment       Current Medications -   Current Outpatient Medications:      ACE/ARB/ARNI NOT PRESCRIBED, INTENTIONAL,, Please choose reason not prescribed, below, Disp: , Rfl:      alendronate (FOSAMAX) 70 MG tablet, Take 1 tablet (70 mg) by mouth with 8oz water every 7 days 30 minutes before breakfast and remain upright during this time., Disp: 12 tablet, Rfl: 3     calcitRIOL (ROCALTROL) 0.25 MCG capsule, Take 1 capsule by mouth daily., Disp: , Rfl:      CELLCEPT (BRAND) 250 MG capsule, Take 3 capsules (750 mg) by mouth 2 times daily, Disp: 540 capsule, Rfl: 3      Cholecalciferol (VITAMIN D) 1000 UNIT capsule, Take 1 capsule by mouth 2 times daily., Disp: , Rfl:      doxycycline hyclate (VIBRA-TABS) 100 MG tablet, Take 1 tablet (100 mg) by mouth 2 times daily, Disp: 20 tablet, Rfl: 0     ELIQUIS 2.5 MG tablet, TAKE 1 TABLET BY MOUTH 2 TIMES DAILY, Disp: 180 tablet, Rfl: 1     Ferrous Gluconate-C-Folic Acid (IRON-C PO), , Disp: , Rfl:      furosemide (LASIX) 20 MG tablet, Take 1 tablet (20 mg) by mouth daily, Disp: 30 tablet, Rfl: 0     gabapentin (NEURONTIN) 300 MG capsule, Take 1 capsule (300 mg) by mouth 3 times daily, Disp: 270 capsule, Rfl: 1     levothyroxine (SYNTHROID/LEVOTHROID) 50 MCG tablet, Take 1 tablet (50 mcg) by mouth daily, Disp: 90 tablet, Rfl: 1     Magnesium 250 MG tablet, Take 1 tablet by mouth daily., Disp: , Rfl:      Multiple Vitamin (DAILY MULTIVITAMIN PO), Take 1 tablet daily, Disp: , Rfl:      Multiple Vitamins-Minerals (PRESERVISION AREDS 2) CAPS, , Disp: , Rfl:      predniSONE (DELTASONE) 5 MG tablet, TAKE ONE TABLET BY MOUTH EVERY DAY, Disp: 90 tablet, Rfl: 3     traMADol (ULTRAM) 50 MG tablet, Take 1 tablet (50 mg) by mouth 2 times daily as needed for severe pain Please make appointment within 30 days, Disp: 60 tablet, Rfl: 0     vitamin B-Complex, Take 1 tablet by mouth daily, Disp: , Rfl:     Allergies -   Allergies   Allergen Reactions     Penicillins Hives and Rash     Sulfa Drugs Hives and Rash       Social History -   Social History     Socioeconomic History     Marital status:      Spouse name: Not on file     Number of children: Not on file     Years of education: Not on file     Highest education level: Not on file   Occupational History     Not on file   Social Needs     Financial resource strain: Not on file     Food insecurity:     Worry: Not on file     Inability: Not on file     Transportation needs:     Medical: Not on file     Non-medical: Not on file   Tobacco Use     Smoking status: Never Smoker     Smokeless tobacco:  "Never Used   Substance and Sexual Activity     Alcohol use: No     Drug use: No     Sexual activity: Not Currently     Partners: Female   Lifestyle     Physical activity:     Days per week: Not on file     Minutes per session: Not on file     Stress: Not on file   Relationships     Social connections:     Talks on phone: Not on file     Gets together: Not on file     Attends Restorationist service: Not on file     Active member of club or organization: Not on file     Attends meetings of clubs or organizations: Not on file     Relationship status: Not on file     Intimate partner violence:     Fear of current or ex partner: Not on file     Emotionally abused: Not on file     Physically abused: Not on file     Forced sexual activity: Not on file   Other Topics Concern     Parent/sibling w/ CABG, MI or angioplasty before 65F 55M? Not Asked   Social History Narrative     Not on file       Family History - No family history on file.    Review of Systems - As per HPI and PMHx, otherwise 10+ system review of the head and neck, and general constitution is negative.    Physical Exam  /58   Pulse 108   Ht 1.575 m (5' 2\")   Wt 59.9 kg (132 lb)   BMI 24.14 kg/m       General - The patient is well nourished and well developed, and appears to have good nutritional status.  Alert and oriented to person and place, answers questions and cooperates with examination appropriately.   Head and Face - Normocephalic and atraumatic, with no gross asymmetry noted of the contour of the facial features.  The facial nerve is intact, with strong symmetric movements.  Voice and Breathing - The patient was breathing comfortably without the use of accessory muscles. There was no wheezing, stridor, or stertor.  The patients voice was clear and strong, and had appropriate pitch and quality.  Eyes - Extraocular movements intact, and the pupils were reactive to light.  Sclera were not icteric or injected, conjunctiva were pink and moist.  Mouth " - Examination of the oral cavity showed pink, healthy oral mucosa. No lesions or ulcerations noted.  The tongue was mobile and midline, and the dentition were in good condition.    Throat - The walls of the oropharynx were smooth, pink, moist, symmetric, and had no lesions or ulcerations.  The tonsillar pillars and soft palate were symmetric.  The uvula was midline on elevation.      Cerumen Removal    Physical Exam and Procedure  Ears - On examination of the ears, I found that the BOTH sides had cerumen impaction.  Therefore, I positioned them in the examination chair in a semi-supine position, beginning with the right side.  I used the binocular surgical microscope to perform cerumen removal.  I began by using a cerumen loop to gently lift the edges of the cerumen mass away from the walls of the external canal.  Once I did this, I was able to suction away fragments of wax and debris using suction.  Once the mass was loose enough, the entire plug was pulled from the canal.  The tympanic membrane was intact, no sign of perforation or middle ear effusion.    I turned my attention to the contralateral side once again using the binocular surgical microscope to perform cerumen removal.  I began by using a cerumen loop to gently lift the edges of the cerumen mass away from the walls of the external canal.  Once I did this, I was able to suction away fragments of wax and debris using suction.  Once the mass was loose enough, the entire plug was pulled from the canal.  The tympanic membrane was intact, no sign of perforation or middle ear effusion.        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  I have discussed ear care at home,  including avoiding qtips or any other object placed in the canal.  I have also discussed that over the counter cerumen kits like Debrox or Cerumenex can be useful.    If no other issues, follow up with me in one year for an ear check.      Again, thank you for allowing me to participate in the care of your patient.        Sincerely,        Bryn Titus MD

## 2019-04-01 NOTE — PATIENT INSTRUCTIONS
To Whom it May Concern, persons / family caring for Aman Newton has a very chronic deep seated lower extremity edema condition with lymphedema and stasis dermatitis and a large propensity for developing open blistering skin eruptions and infections. And worsened edema problems. It is very important for us to do our very best to try to PREVENT this condition from getting away from us. If we fail in this task, Aman is at major risk of developing infections, cellulitis , abscesses and eventually sepsis and hospitalization .     Goals.    1. Leg elevation is emphasized . Because of the effects of gravity, we strongly recommend leg elevation as much as possible, but at the very minimum 20-30 minutes 2 times a day and with sleeping, try to lay in a flat normal bed. It is recommended to place a wedge pillow between the mattress and box springs as this allows some nighttime leg elevation !    2. While up during the day it is recommended to have patient wear snug ace wrap bandages to help control the edema. There are more potent types of compression therapy but I don't want to go to a lymphedema clinic unless we have no choice.    3. With the current findings from 04/01/19 I think there is probably a component of infection and I am prescribing  CephALEXin (KEFLEX) , however important to understand the antibiotic only helps with decreasing the amount of redness and not necessarily the actual swelling .

## 2019-04-02 NOTE — TELEPHONE ENCOUNTER
Only number listed in chart is for daughter Kathleen-Voice mail left for her to call RN hotline at [phone].     Notes recorded by Jluis Marroquin MD on 4/1/2019 at 8:42 PM CDT  This most important thing here in my opinion is that the white blood cell count is within normal limits and no changes with the hemoglobin or hemoglobin a1c  [ diabetes test ]. We do see an elevated erythrocyte sedimentation rate and elevated C reactive protein. It's difficult to explain these laboratory studies other then to    A] suggest probably nothing serious  B] keep an eye on things   C] recheck C reactive protein and erythrocyte sedimentation rate in 3 months     Jluis Marroquin MD     FYI: The C-reactive protein (CRP) test is used to find inflammation and infection in your body. It does this by measuring the amount of CRP in your blood. CRP is a protein made by the liver and sent into the bloodstream. Blood levels may be higher when you have inflammation or an infection.    Tawana Dunbar RN

## 2019-04-02 NOTE — TELEPHONE ENCOUNTER
Patients daughter Kathleen notified of providers message as written.  Future lab orders placed. Kathleen verbalized understanding and will relay message to patient.   Tawaan Dunbar RN

## 2019-04-03 NOTE — TELEPHONE ENCOUNTER
Reason for call: Question on lab work done on Monday 4-1-19  Patient called regarding (reason for call): appointment - Daughter is calling  Additional comments: Patient was seen on Monday and had blood work done. She would like to know if the blood that was taken was also done from the order of his transplant doctor at the [facility] or will she need to bring him back in to have more labs done? Please call.    Phone number to reach patient:  Other phone number:  [phone] *    Best Time:  any    Can we leave a detailed message on this number?  YES

## 2019-04-04 NOTE — TELEPHONE ENCOUNTER
Called Kathleen and left detailed VM informing her only labs from Dr. Marroquin were completed.  Sabrina SEWELL CMA ([facility])

## 2019-04-19 NOTE — TELEPHONE ENCOUNTER
Reason for call:  Hearing aid questions  Patient called regarding (reason for call): Daughter is calling Kathleen  Additional comments: States patient's hearing aid has broken and wants to discuss this without having to bring patient in to be seen. Please call.    Phone number to reach patient:  Other phone number:  [phone]*    Best Time:  any    Can we leave a detailed message on this number?  YES

## 2019-04-22 NOTE — TELEPHONE ENCOUNTER
Left message for Kathleen informing her that Newton's hearing aid can be dropped off for evaluation and repair by audiology.    Santiago Kohler, [facility]  Licensed Audiologist #[id number]  4/22/2019

## 2019-04-23 NOTE — PROGRESS NOTES
HEARING AID DROP-OFF    Patient Name:  Aman Baker    Patient Age:   84 year old    :  1934    Background:   Patient dropped off their hearing aid with the report of broken.      SIDE: Rihgt    : Phonak    TYPE: Virto B50 ITE    S/N: [id number]    WARRANTY:     Procedures:   The hearing aid is in pieces. The pieces were sent to [facility] for repair/ replacement.     Plan:   Patient was contacted in regards to status of hearing aid/s dropped off today. Krista was informed of the need to send in the hearing aid and costs associated with repair/replacment. Krista will be called once the hearing aid is back to pick it up at the 2nd floor .     NO CHARGE VISIT    Godfrey Robles CCC-A  Licensed Audiologist  2019

## 2019-04-29 NOTE — TELEPHONE ENCOUNTER
I informed Krista that Newton's hearing aid is back from repair and ready to be picked up at the 2nd floor .     -Godfrey Robles CCC-A

## 2019-05-01 NOTE — TELEPHONE ENCOUNTER
Called daughter Kathleen. She reports being told from [facility] that the SMN sent in today was an old form.  A new form will need to be completed which I will assist with.          PARESH Heard, [facility]  Liver Transplant   Phone [phone]  Pager [phone]

## 2019-05-01 NOTE — PROGRESS NOTES
Faxed SMN to [employer] today for patient's re-enrollment in their [facility] program for free [employer].      PARESH Heard, [facility]  Liver Transplant   Phone [phone]  Pager [phone]

## 2019-05-02 NOTE — PROGRESS NOTES
Addendum on 5/2/19: A new SMN was requested by [email or web address].  A new SMN form was completed by Dr. Matt and I faxed to [email or web address] this afternoon.  I also called [email or web address] and requested patient's application be expedited.  I left a voice message for patient's daughter Kathleen with this information. Message routed to Rody Aranda, Transplant Coordinator.        PARESH Heard, [facility]  Liver Transplant   Phone [phone]  Pager [phone]

## 2019-05-06 NOTE — TELEPHONE ENCOUNTER
Routing refill request to provider for review/approval because:  Drug not on the [facility] refill protocol   Last OV 4-1-19  Mel Contreras RN

## 2019-05-06 NOTE — TELEPHONE ENCOUNTER
Controlled Substance Refill Request for traMADol (ULTRAM) 50 MG tablet  Problem List Complete:  No     PROVIDER TO CONSIDER COMPLETION OF PROBLEM LIST AND OVERVIEW/CONTROLLED SUBSTANCE AGREEMENT    Last Written Prescription Date:  3/8/2019  Last Fill Quantity: 60,   # refills: 0    THE MOST RECENT OFFICE VISIT MUST BE WITHIN THE PAST 3 MONTHS. AT LEAST ONE FACE TO FACE VISIT MUST OCCUR EVERY 6 MONTHS. ADDITIONAL VISITS CAN BE VIRTUAL.  (THIS STATEMENT SHOULD BE DELETED.)    Last Office Visit with [facility] primary care provider: 4/1/2019    Future Office visit:   Next 5 appointments (look out 90 days)    Jun 26, 2019  2:00 PM CDT  Return Visit with John Cowart MD  [facility] ([facility]) [address] [place] [address] [id number] [phone]          Controlled substance agreement:   Encounter-Level CSA:    There are no encounter-level csa.     Patient-Level CSA:    There are no patient-level csa.         Last Urine Drug Screen: No results found for: CDAUT, No results found for: COMDAT, No results found for: THC13, PCP13, COC13, MAMP13, OPI13, AMP13, BZO13, TCA13, MTD13, BAR13, OXY13, PPX13, BUP13      [email or web address]       checked in past 3 months?  No, route to RN

## 2019-05-06 NOTE — TELEPHONE ENCOUNTER
Ultram prescription faxed to [facility] pharmacy at [phone].  Savita Johnson,

## 2019-06-24 PROBLEM — I10 HTN (HYPERTENSION): Status: ACTIVE | Noted: 2019-01-01

## 2019-06-24 PROBLEM — R65.21 SEPTIC SHOCK (H): Status: ACTIVE | Noted: 2019-01-01

## 2019-06-24 PROBLEM — N17.9 AKI (ACUTE KIDNEY INJURY) (H): Status: ACTIVE | Noted: 2019-01-01

## 2019-06-24 PROBLEM — C34.90 NON-SMALL CELL CARCINOMA OF LUNG (H): Status: ACTIVE | Noted: 2019-01-01

## 2019-06-24 PROBLEM — A41.9 SEPTIC SHOCK (H): Status: ACTIVE | Noted: 2019-01-01

## 2019-06-24 PROBLEM — G93.41 ENCEPHALOPATHY IN SEPSIS: Status: ACTIVE | Noted: 2019-01-01

## 2019-06-24 PROBLEM — E78.5 HYPERLIPIDEMIA: Status: ACTIVE | Noted: 2019-01-01

## 2019-06-24 PROBLEM — F03.90 DEMENTIA WITHOUT BEHAVIORAL DISTURBANCE (H): Status: ACTIVE | Noted: 2019-01-01

## 2019-06-24 PROBLEM — M47.814 DEGENERATIVE ARTHRITIS OF THORACIC SPINE: Status: ACTIVE | Noted: 2019-01-01

## 2019-06-24 PROBLEM — D72.819 LEUKOPENIA: Status: ACTIVE | Noted: 2019-01-01

## 2019-06-24 PROBLEM — J69.0 ASPIRATION PNEUMONIA OF RIGHT LUNG (H): Status: ACTIVE | Noted: 2019-01-01

## 2019-06-24 PROBLEM — N18.9 CKD (CHRONIC KIDNEY DISEASE): Status: ACTIVE | Noted: 2019-01-01

## 2019-06-24 PROBLEM — L89.151 PRESSURE INJURY OF SACRAL REGION, STAGE 1: Status: ACTIVE | Noted: 2019-01-01

## 2019-06-24 NOTE — LETTER
6/24/2019        RE: Aman Baker  [address] [facility] [id number]        [place] [facility]  PRIMARY CARE PROVIDER AND CLINIC:  Jluis Marroquin MD, [address] [facility] [address]  Chief Complaint   Patient presents with     Hospital F/U     [facility] Medical Record Number:  [id number]  Place of Service where encounter took place:  [facility] ([facility]) [[id number]]    Aman Baker  is a 85 year old  (5/6/1934), admitted to the above facility from  [facility] . Hospital stay 5/23/19 through 6/21/19..  Admitted to this facility for  rehab, medical management and nursing care.    HPI:    HPI information obtained from: facility chart records, facility staff, patient report, [facility] chart review and Care Everywhere [facility] chart review.   Brief Summary of Hospital Course: Aman Baker has a past medical history of progressive dementia at home with some mild sundowning, liver transplant in 2005, prostate cancer, bladder cancer, HTN, afib on apixaban, chronic pain, hypothyroidism.  S/he was admitted to the hospita. after falling and lying on his driveway for an unknown period of time (thought to be short) and found to have sepsis, pneumonia.  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  It was difficult to control his delirium and he remains very confused.    Updates on Status Since Skilled nursing Admission: Aman reports no concerns and has difficulty answering simple questions today.  Nursing reports confusion, requiring significant monitoring/supervision to maintain safety.  family reports a desire to take him home to live with her after discharge from TCU.       CODE STATUS/ADVANCE DIRECTIVES DISCUSSION:   DNR only  Patient's living condition: lives alone  ALLERGIES: Penicillins and Sulfa  drugs  PAST MEDICAL HISTORY:  has a past medical history of Chronic atrial fibrillation (H) (11/21/2012) and Prostate cancer (H) (1/2013).  PAST SURGICAL HISTORY:   has a past surgical history that includes transplant (2004).  FAMILY HISTORY: family history is not on file.  SOCIAL HISTORY:   reports that he has never smoked. He has never used smokeless tobacco. He reports that he does not drink alcohol or use drugs.    Post Discharge Medication Reconciliation Status: discharge medications reconciled and changed, per note/orders (see AVS)    Current Outpatient Medications   Medication Sig Dispense Refill     ACE/ARB/ARNI NOT PRESCRIBED, INTENTIONAL, Please choose reason not prescribed, below       CELLCEPT (BRAND) 250 MG capsule Take 3 capsules (750 mg) by mouth 2 times daily 540 capsule 3     Cholecalciferol (VITAMIN D) 1000 UNIT capsule Take 1 capsule by mouth 2 times daily.       ELIQUIS 2.5 MG tablet TAKE 1 TABLET BY MOUTH 2 TIMES DAILY 180 tablet 1     levothyroxine (SYNTHROID/LEVOTHROID) 50 MCG tablet Take 1 tablet (50 mcg) by mouth daily 90 tablet 1     vitamin B-Complex Take 1 tablet by mouth daily         ROS:  Limited secondary to cognitive impairment but today pt reports no new concerns    Vitals:  BP 96/58   Pulse 82   Temp 98.1  F (36.7  C)   Resp 18   Wt 54.4 kg (120 lb)   SpO2 97%   BMI 21.95 kg/m     Exam:  GENERAL APPEARANCE:  Alert, in no acute distress , thin, pale  HEAD:  Normal, normocephalic, atraumatic  EYE EXAM: normal external eye, conjunctiva, lids, DARI  NECK EXAM: supple, no JVD  CHEST/RESP:  respiratory effort normal, lung sounds CTA, diminished , no respiratory distress  CV:  Rate reg, rhythm reg, no murmur, no peripheral edema   M/S:   extremities abnormal, gait abnormal-needs supervision to transfer and walk-unsteady on his feet, normal muscle tone, and range of motion normal   NEUROLOGIC EXAM: Normal gross motor movement, tone and coordination. No tremor. Cranial nerves 2-12 are  normal tested and grossly at patient's baseline  PSYCH:  Alert and unable to determine orientation due to cognitive impairment , not able to answer simple questions with accuracy    Lab/Diagnostic data:  Recent labs in [facility] reviewed by me today.     ASSESSMENT/PLAN:  Dementia without behavioral disturbance, unspecified dementia type  Delirium  Patient with known underlying dementia and some sundowning symptoms who developed delirium during hospital stay.  On many different medications during that hospital stay, including haldol, risperdal.  Now on seroquel at hs, depakote tid, melatonin.  Family reports mentation is clearing a bit.    -Maintain safe living situation   -attempt GDR when feasible    Non-small cell carcinoma of lung (H)  New diagnosis, and had consults with oncology, radiation oncology, and palliative care during hospital stay.  Now S/P 10 radiation treatments and may consider another 5 treatments down the road, or trend more to hospice type care. Family still considering options, wants to see how his mentation improves.     Liver replaced by transplant (H)  Patient S/P liver transplant in 2004 or 2005 due to alpha-1-antitrypsin deficiency, on mycophenylate, prednisone 5 mg daily for anti-rejection.  Noted to be currently on prednisone 15 mg daily (stress steroids during hospital stay), with recommendations to decrease prednisone to normal dose in about 1 week.     Chronic atrial fibrillation (H)  On apixaban chronically.  Previously on metoprolol but this was apparently discontinued several months ago by PCP due to hypotension.  Now has been resumed on it, but BP trending  systolic.  HR trending about 100 and regular.   -discontinue metoprolol for now, monitor HR and may need to reconsider medication for rate control    Type 2 diabetes mellitus without complication, without long-term current use of insulin (H)  Report of history of DM2, diet controlled with A1C 5.9% on 4/1/19 per review of Epic.         transcribed by : Sabrina Encarnacion  1. Discontinue cholecalciferol 54485 5 x week  2. Cholecalciferol 1000 unit(s) BID Dx supplement  3. Discontinue metoprolol  4. Discontinue prednisone 15 mg every day on 6/28/19  5. On 6/30/19 start prednisone 5 mg po every day Dx liver transplant  6. Okay for in-house psych  7. Lab draw on 6/26/19 CBC, BMP Dx anemia and HTN    Total time spent with patient visit at the [facility] nursing [facility] was 35 min including patient visit, review of past records and phone call to patient contact. Greater than 50% of total time spent with counseling and coordinating care due to multiple medical comorbid conditions     Electronically signed by:  ANIL Erazo CNP                         Sincerely,        ANIL Erazo CNP

## 2019-06-24 NOTE — PROGRESS NOTES
[place] [facility]  PRIMARY CARE PROVIDER AND CLINIC:  Jluis Marroquin MD, [address] [facility] [place] [address]  Chief Complaint   Patient presents with     Hospital F/U     [facility] Medical Record Number:  [id number]  [facility] of [facility] where encounter took place[facility] ([facility]) [[id number]]    Aman Baker  is a 85 year old  (5/6/1934), admitted to the above facility from  [facility] . Hospital stay 5/23/19 through 6/21/19..  Admitted to this facility for  rehab, medical management and nursing care.    HPI:    HPI information obtained from: facility chart records, facility staff, patient report, [facility] Epic chart review and Care Everywhere Epic chart review.   Brief Summary of Hospital Course: Aman Baker has a past medical history of progressive dementia at home with some mild sundowning, liver transplant in 2005, prostate cancer, bladder cancer, HTN, afib on apixaban, chronic pain, hypothyroidism.  S/he was admitted to the hospita. after falling and lying on his driveway for an unknown period of time (thought to be short) and found to have sepsis, pneumonia.  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  It was difficult to control his delirium and he remains very confused.    Updates on Status Since Skilled nursing Admission: Aman reports no concerns and has difficulty answering simple questions today.  Nursing reports confusion, requiring significant monitoring/supervision to maintain safety.  family reports a desire to take him home to live with her after discharge from TCU.       CODE STATUS/ADVANCE DIRECTIVES DISCUSSION:   DNR only  Patient's living condition: lives alone  ALLERGIES: Penicillins and Sulfa drugs  PAST MEDICAL HISTORY:  has a past medical history of Chronic atrial fibrillation (H) (11/21/2012) and  Prostate cancer (H) (1/2013).  PAST SURGICAL HISTORY:   has a past surgical history that includes transplant (2004).  FAMILY HISTORY: family history is not on file.  SOCIAL HISTORY:   reports that he has never smoked. He has never used smokeless tobacco. He reports that he does not drink alcohol or use drugs.    Post Discharge Medication Reconciliation Status: discharge medications reconciled and changed, per note/orders (see AVS)    Current Outpatient Medications   Medication Sig Dispense Refill     ACE/ARB/ARNI NOT PRESCRIBED, INTENTIONAL, Please choose reason not prescribed, below       CELLCEPT (BRAND) 250 MG capsule Take 3 capsules (750 mg) by mouth 2 times daily 540 capsule 3     Cholecalciferol (VITAMIN D) 1000 UNIT capsule Take 1 capsule by mouth 2 times daily.       ELIQUIS 2.5 MG tablet TAKE 1 TABLET BY MOUTH 2 TIMES DAILY 180 tablet 1     levothyroxine (SYNTHROID/LEVOTHROID) 50 MCG tablet Take 1 tablet (50 mcg) by mouth daily 90 tablet 1     vitamin B-Complex Take 1 tablet by mouth daily         ROS:  Limited secondary to cognitive impairment but today pt reports no new concerns    Vitals:  BP 96/58   Pulse 82   Temp 98.1  F (36.7  C)   Resp 18   Wt 54.4 kg (120 lb)   SpO2 97%   BMI 21.95 kg/m    Exam:  GENERAL APPEARANCE:  Alert, in no acute distress , thin, pale  HEAD:  Normal, normocephalic, atraumatic  EYE EXAM: normal external eye, conjunctiva, lids, DARI  NECK EXAM: supple, no JVD  CHEST/RESP:  respiratory effort normal, lung sounds CTA, diminished , no respiratory distress  CV:  Rate reg, rhythm reg, no murmur, no peripheral edema   M/S:   extremities abnormal, gait abnormal-needs supervision to transfer and walk-unsteady on his feet, normal muscle tone, and range of motion normal   NEUROLOGIC EXAM: Normal gross motor movement, tone and coordination. No tremor. Cranial nerves 2-12 are normal tested and grossly at patient's baseline  PSYCH:  Alert and unable to determine orientation due to  cognitive impairment , not able to answer simple questions with accuracy    Lab/Diagnostic data:  Recent labs in EPIC reviewed by me today.     ASSESSMENT/PLAN:  Dementia without behavioral disturbance, unspecified dementia type  Delirium  Patient with known underlying dementia and some sundowning symptoms who developed delirium during hospital stay.  On many different medications during that hospital stay, including haldol, risperdal.  Now on seroquel at hs, depakote tid, melatonin.  Family reports mentation is clearing a bit.    -Maintain safe living situation   -attempt GDR when feasible    Non-small cell carcinoma of lung (H)  New diagnosis, and had consults with oncology, radiation oncology, and palliative care during hospital stay.  Now S/P 10 radiation treatments and may consider another 5 treatments down the road, or trend more to hospice type care. Family still considering options, wants to see how his mentation improves.     Liver replaced by transplant (H)  Patient S/P liver transplant in 2004 or 2005 due to alpha-1-antitrypsin deficiency, on mycophenylate, prednisone 5 mg daily for anti-rejection.  Noted to be currently on prednisone 15 mg daily (stress steroids during hospital stay), with recommendations to decrease prednisone to normal dose in about 1 week.     Chronic atrial fibrillation (H)  On apixaban chronically.  Previously on metoprolol but this was apparently discontinued several months ago by PCP due to hypotension.  Now has been resumed on it, but BP trending  systolic.  HR trending about 100 and regular.   -discontinue metoprolol for now, monitor HR and may need to reconsider medication for rate control    Type 2 diabetes mellitus without complication, without long-term current use of insulin (H)  Report of history of DM2, diet controlled with A1C 5.9% on 4/1/19 per review of Epic.        transcribed by : Sabrina Encarnacion  1. Discontinue cholecalciferol 20434 5 x week  2.  Cholecalciferol 1000 unit(s) BID Dx supplement  3. Discontinue metoprolol  4. Discontinue prednisone 15 mg every day on 6/28/19  5. On 6/30/19 start prednisone 5 mg po every day Dx liver transplant  6. Okay for in-house psych  7. Lab draw on 6/26/19 CBC, BMP Dx anemia and HTN    Total time spent with patient visit at the [facility] nursing [facility] was 35 min including patient visit, review of past records and phone call to patient contact. Greater than 50% of total time spent with counseling and coordinating care due to multiple medical comorbid conditions     Electronically signed by:  ANIL Erazo CNP

## 2019-06-26 NOTE — PROGRESS NOTES
[place] [facility]  [place] Medical Record Number:  [id number]  Place of Service where encounter took place[facility] ([facility]) [[id number]]  Chief Complaint   Patient presents with     Nursing Home Acute       HPI:    Aman Baker  is a 85 year old (5/6/1934), who is being seen today for an episodic care visit.  HPI information obtained from: facility chart records, facility staff, patient report and [facility] chart review. Today's concern is:     Dementia without behavioral disturbance, unspecified dementia type  Delirium  Liver replaced by transplant (H)  Leukocytosis, unspecified type   Aman reports no new concerns.  Nursing reports no new concerns, remains confused but easily redirectable.       Past Medical and Surgical History reviewed in Epic today.    MEDICATIONS:  Current Outpatient Medications   Medication Sig Dispense Refill     ACE/ARB/ARNI NOT PRESCRIBED, INTENTIONAL, Please choose reason not prescribed, below       acetaminophen (TYLENOL) 325 MG tablet Take 2 tablets (650 mg) by mouth every 4 hours as needed for mild pain       CELLCEPT (BRAND) 250 MG capsule Take 3 capsules (750 mg) by mouth 2 times daily 540 capsule 3     Cholecalciferol (VITAMIN D) 1000 UNIT capsule Take 1 capsule by mouth 2 times daily.       divalproex sodium delayed-release (DEPAKOTE) 125 MG DR tablet Take 1 tablet (125 mg) by mouth 3 times daily       ELIQUIS 2.5 MG tablet TAKE 1 TABLET BY MOUTH 2 TIMES DAILY 180 tablet 1     ferrous sulfate (FE TABS) 325 (65 Fe) MG EC tablet Take 1 tablet (325 mg) by mouth daily       gabapentin (NEURONTIN) 300 MG capsule Take 1 capsule (300 mg) by mouth 2 times daily       levothyroxine (SYNTHROID/LEVOTHROID) 50 MCG tablet Take 1 tablet (50 mcg) by mouth daily 90 tablet 1     melatonin 3 MG tablet Take 1 tablet (3 mg) by mouth At Bedtime       ondansetron (ZOFRAN) 4 MG tablet Take 1 tablet (4 mg) by mouth every 6 hours as needed for nausea       predniSONE  (DELTASONE) 10 MG tablet Take 1.5 tablets (15 mg) by mouth daily for 5 days 8 tablet 0     [START ON 6/30/2019] predniSONE (DELTASONE) 5 MG tablet Take 1 tablet (5 mg) by mouth daily       QUEtiapine (SEROQUEL) 25 MG tablet Take 0.5 tablets (12.5 mg) by mouth daily. May also take 0.5 tablets (12.5 mg) 2 times daily as needed (restlessnes and agitation).       traMADol (ULTRAM) 50 MG tablet Take 0.5-1 tablets (25-50 mg) by mouth 2 times daily as needed for severe pain 1 tab for pain 1-5/10 and 2 tabs for pain 6-10/10 10 tablet 0     traMADol (ULTRAM) 50 MG tablet Take 1 tablet (50 mg) by mouth 2 times daily for 3 days 10 tablet 0     vitamin B-Complex Take 1 tablet by mouth daily       REVIEW OF SYSTEMS:  Limited secondary to cognitive impairment but today pt reports no new concerns.     Objective:  /62   Pulse 88   Temp 97.6  F (36.4  C)   Resp 22   Wt 55.3 kg (122 lb)   SpO2 97%   BMI 22.31 kg/m    Exam:  GENERAL APPEARANCE:  Alert, in no acute distress   HEAD:  Normal, normocephalic, atraumatic  EYE EXAM: normal external eye, conjunctiva, lids, DARI  CHEST/RESP:  respiratory effort normal, lung sounds CTA , no respiratory distress  CV:  Rate reg, rhythm reg, no  murmur, no peripheral edema  M/S:   extremities normal, gait abnormal-slow, unsteady but walking 150 ft with rolling walker   NEUROLOGIC EXAM: Normal gross motor movement, tone and coordination. No tremor. Cranial nerves 2-12 are normal tested and grossly at patient's baseline  PSYCH:  Alert and oriented to self and family, affect pleasant, confused but easily redirectable.      Labs:   Recent labs in EPIC reviewed by me today.     ASSESSMENT/PLAN:  Dementia without behavioral disturbance, unspecified dementia type  Patient with dementia, settling in to routine at TCU, pleasant and redirectable.  Chronic condition, ongoing decline expected. Maintain safe living situation.    Delirium  He remains on seroquel and depakote tid. Reported to be  sleeping better at night.  Continue for now, anticipate discontinuation of seroquel next visit if behaviors remain appropriate.     Liver replaced by transplant (H)  Stable without new symptoms.  On prednisone, mycophenylate    Leukocytosis, unspecified type  Noted to have mild leukocytosis today, with WBC 11.2.  Not overtly ill, no fever, no complaints of dysuria, LS CTA.  Differential diagnosis could be underlying infection versus due to increased prednisone dose.    -push fluids  -check UA for completeness  -recheck next week-prednisone dose to decrease soon.       transcribed by :   1. Push fluids 240 ml TID po  2. UA/conditional UC Dx elevated WBC  3. Lab draw on 7/1/19 - CBC with diff and BMP Dx anemia and HTN      Electronically signed by:  ANIL Erazo CNP

## 2019-06-26 NOTE — LETTER
6/26/2019        RE: Aman Baker  [address] [facility] [address]        [place] [facility]  [place] Medical Record Number:  [id number]  Place of Service where encounter took place:  [facility] ([facility]) [[id number]]  Chief Complaint   Patient presents with     Nursing Home Acute       HPI:    Aman Baker  is a 85 year old (5/6/1934), who is being seen today for an episodic care visit.  HPI information obtained from: facility chart records, facility staff, patient report and [facility] chart review. Today's concern is:     Dementia without behavioral disturbance, unspecified dementia type  Delirium  Liver replaced by transplant (H)  Leukocytosis, unspecified type   Aman reports no new concerns.  Nursing reports no new concerns, remains confused but easily redirectable.       Past Medical and Surgical History reviewed in Epic today.    MEDICATIONS:  Current Outpatient Medications   Medication Sig Dispense Refill     ACE/ARB/ARNI NOT PRESCRIBED, INTENTIONAL, Please choose reason not prescribed, below       acetaminophen (TYLENOL) 325 MG tablet Take 2 tablets (650 mg) by mouth every 4 hours as needed for mild pain       CELLCEPT (BRAND) 250 MG capsule Take 3 capsules (750 mg) by mouth 2 times daily 540 capsule 3     Cholecalciferol (VITAMIN D) 1000 UNIT capsule Take 1 capsule by mouth 2 times daily.       divalproex sodium delayed-release (DEPAKOTE) 125 MG DR tablet Take 1 tablet (125 mg) by mouth 3 times daily       ELIQUIS 2.5 MG tablet TAKE 1 TABLET BY MOUTH 2 TIMES DAILY 180 tablet 1     ferrous sulfate (FE TABS) 325 (65 Fe) MG EC tablet Take 1 tablet (325 mg) by mouth daily       gabapentin (NEURONTIN) 300 MG capsule Take 1 capsule (300 mg) by mouth 2 times daily       levothyroxine (SYNTHROID/LEVOTHROID) 50 MCG tablet Take 1 tablet (50 mcg) by mouth daily 90 tablet 1     melatonin 3 MG tablet Take 1 tablet (3 mg) by mouth At Bedtime       ondansetron  (ZOFRAN) 4 MG tablet Take 1 tablet (4 mg) by mouth every 6 hours as needed for nausea       predniSONE (DELTASONE) 10 MG tablet Take 1.5 tablets (15 mg) by mouth daily for 5 days 8 tablet 0     [START ON 6/30/2019] predniSONE (DELTASONE) 5 MG tablet Take 1 tablet (5 mg) by mouth daily       QUEtiapine (SEROQUEL) 25 MG tablet Take 0.5 tablets (12.5 mg) by mouth daily. May also take 0.5 tablets (12.5 mg) 2 times daily as needed (restlessnes and agitation).       traMADol (ULTRAM) 50 MG tablet Take 0.5-1 tablets (25-50 mg) by mouth 2 times daily as needed for severe pain 1 tab for pain 1-5/10 and 2 tabs for pain 6-10/10 10 tablet 0     traMADol (ULTRAM) 50 MG tablet Take 1 tablet (50 mg) by mouth 2 times daily for 3 days 10 tablet 0     vitamin B-Complex Take 1 tablet by mouth daily       REVIEW OF SYSTEMS:  Limited secondary to cognitive impairment but today pt reports no new concerns.     Objective:  /62   Pulse 88   Temp 97.6  F (36.4  C)   Resp 22   Wt 55.3 kg (122 lb)   SpO2 97%   BMI 22.31 kg/m     Exam:  GENERAL APPEARANCE:  Alert, in no acute distress   HEAD:  Normal, normocephalic, atraumatic  EYE EXAM: normal external eye, conjunctiva, lids, DARI  CHEST/RESP:  respiratory effort normal, lung sounds CTA , no respiratory distress  CV:  Rate reg, rhythm reg, no  murmur, no peripheral edema  M/S:   extremities normal, gait abnormal-slow, unsteady but walking 150 ft with rolling walker   NEUROLOGIC EXAM: Normal gross motor movement, tone and coordination. No tremor. Cranial nerves 2-12 are normal tested and grossly at patient's baseline  PSYCH:  Alert and oriented to self and family, affect pleasant, confused but easily redirectable.      Labs:   Recent labs in [employer] reviewed by me today.     ASSESSMENT/PLAN:  Dementia without behavioral disturbance, unspecified dementia type  Patient with dementia, settling in to routine at U, pleasant and redirectable.  Chronic condition, ongoing decline expected.  Maintain safe living situation.    Delirium  He remains on seroquel and depakote tid. Reported to be sleeping better at night.  Continue for now, anticipate discontinuation of seroquel next visit if behaviors remain appropriate.     Liver replaced by transplant (H)  Stable without new symptoms.  On prednisone, mycophenylate    Leukocytosis, unspecified type  Noted to have mild leukocytosis today, with WBC 11.2.  Not overtly ill, no fever, no complaints of dysuria, LS CTA.  Differential diagnosis could be underlying infection versus due to increased prednisone dose.    -push fluids  -check UA for completeness  -recheck next week-prednisone dose to decrease soon.       transcribed by :   1. Push fluids 240 ml TID po  2. UA/conditional UC Dx elevated WBC  3. Lab draw on 7/1/19 - CBC with diff and BMP Dx anemia and HTN      Electronically signed by:  ANIL Erazo CNP         Sincerely,        ANIL Erazo CNP

## 2019-07-02 NOTE — PROGRESS NOTES
"[place] [facility]  [place] Medical Record Number:  [id number]  Place of Service where encounter took place:  [facility] ([facility] [[id number]]  Chief Complaint   Patient presents with     Nursing Home Acute       HPI:    Aman Baker  is a 85 year old (5/6/1934), who is being seen today for an episodic care visit.  HPI information obtained from: facility chart records, facility staff, patient report and [facility] chart review.     Seeing patient for a f/u. Patient is new to provider. He has moved from TCU to ACU.   Per staff patient is impulsive and attempts to stand or ambulate several times per hour. Staff report he has been 1:1 due to impulsivity. Behaviors worsen in the evening. Today patient states \"yes\" when asked if he feels anxious. He denies pain.   Breathing is not labored.   No nsg concerns.     Past Medical and Surgical History reviewed in Epic today.    MEDICATIONS:  Current Outpatient Medications   Medication Sig Dispense Refill     ACE/ARB/ARNI NOT PRESCRIBED, INTENTIONAL, Please choose reason not prescribed, below       acetaminophen (TYLENOL) 325 MG tablet Take 2 tablets (650 mg) by mouth every 4 hours as needed for mild pain       CELLCEPT (BRAND) 250 MG capsule Take 3 capsules (750 mg) by mouth 2 times daily 540 capsule 3     Cholecalciferol (VITAMIN D) 1000 UNIT capsule Take 1 capsule by mouth 2 times daily.       divalproex sodium delayed-release (DEPAKOTE) 125 MG DR tablet Take 1 tablet (125 mg) by mouth 3 times daily       ELIQUIS 2.5 MG tablet TAKE 1 TABLET BY MOUTH 2 TIMES DAILY 180 tablet 1     ferrous sulfate (FE TABS) 325 (65 Fe) MG EC tablet Take 1 tablet (325 mg) by mouth daily       gabapentin (NEURONTIN) 300 MG capsule Take 1 capsule (300 mg) by mouth 2 times daily       levothyroxine (SYNTHROID/LEVOTHROID) 50 MCG tablet Take 1 tablet (50 mcg) by mouth daily 90 tablet 1     melatonin 3 MG tablet Take 1 tablet (3 mg) by mouth At Bedtime       ondansetron " (ZOFRAN) 4 MG tablet Take 1 tablet (4 mg) by mouth every 6 hours as needed for nausea       predniSONE (DELTASONE) 5 MG tablet Take 1 tablet (5 mg) by mouth daily       QUEtiapine (SEROQUEL) 25 MG tablet Take 0.5 tablets (12.5 mg) by mouth daily. May also take 0.5 tablets (12.5 mg) 2 times daily as needed (restlessnes and agitation).       vitamin B-Complex Take 1 tablet by mouth daily         REVIEW OF SYSTEMS:  Unobtainable secondary to cognitive impairment.     Objective:  /72   Pulse 67   Temp 98.2  F (36.8  C)   Resp 17   Wt 55.8 kg (123 lb)   SpO2 98%   BMI 22.50 kg/m    Exam:  GENERAL APPEARANCE:  anxious, uncooperative  RESP:  respiratory effort and palpation of chest normal, lungs clear to auscultation   CV:  Palpation and auscultation of heart done , irregular rhythm tachycardic  ABDOMEN:  no guarding or rebound, bowel sounds normal  SKIN:  no rashes or lesions to exposed skin  PSYCH:  insight and judgement impaired, memory impaired     Labs:   CBC RESULTS:   Recent Labs   Lab Test 07/01/19  1059 06/26/19  1125   WBC 8.7 11.2*   RBC 4.06* 4.45   HGB 11.8* 12.9*   HCT 39.9* 43.6   MCV 98 98   MCH 29.1 29.0   MCHC 29.6* 29.6*   RDW 15.5* 15.0    219       Last Basic Metabolic Panel:  Recent Labs   Lab Test 07/01/19  1059 06/26/19  1125    137   POTASSIUM 4.3 4.0   CHLORIDE 101 101   SG 9.0 9.5   CO2 32 32   BUN 26 24   CR 1.07 0.95   * 187*       Liver Function Studies -   Recent Labs   Lab Test 04/22/19  1603 12/19/18  1529   PROTTOTAL 6.9 7.4   ALBUMIN 3.6 3.6   BILITOTAL 0.3 0.4   ALKPHOS 103 103   AST 25 18   ALT 22 30       TSH   Date Value Ref Range Status   05/01/2018 1.11 0.40 - 4.00 mU/L Final   02/02/2009 2.01 0.4 - 5.0 mU/L Final   ]    Lab Results   Component Value Date    A1C 5.9 04/01/2019    A1C 5.7 05/01/2018       ASSESSMENT/PLAN:  Non-small cell carcinoma of lung (H)  - following with oncology. Has received 10 radiation treatments  - will advanced dementia  and multiple chronic conditions consider hospice    Late onset Alzheimer's disease with behavioral disturbance  - impulsive and aggressive at times. Continue Depakote and Seroquel for now    Liver replaced by transplant (H)  - s/p transplant 2005  - on 5 mg prednisone every day for anti- rejection    Chronic atrial fibrillation (H)  Essential hypertension  - apical pulse tachy today, not on a betablocker, monitor. Nsg to update with concerns or HR >100  - on apixaban      Acquired hypothyroidism  - TSH 4.08 (4/23/19), continue synthroid  - goal TSH 5-6 with afib, consider dose reduction  - labs ordered        transcribed by : Sabrina Encarnacion  1. CBC, CMP, TSH, B12, Folate, Vit D level Dx Dementia and Vit D def  2. OT please email NP if any cog scores.      Electronically signed by:  ANIL Thompson CNP

## 2019-07-02 NOTE — LETTER
"    7/2/2019        [facility] Baker  [address] [facility] [address]        [place] [facility]  [place] Medical Record Number:  [id number]  Place of Service where encounter took place:  [facility] ([facility] [[id number]]  Chief Complaint   Patient presents with     Nursing Home Acute       HPI:    Aman Baker  is a 85 year old (5/6/1934), who is being seen today for an episodic care visit.  HPI information obtained from: facility chart records, facility staff, patient report and [facility] chart review.     Seeing patient for a f/u. Patient is new to provider. He has moved from TCU to ACU.   Per staff patient is impulsive and attempts to stand or ambulate several times per hour. Staff report he has been 1:1 due to impulsivity. Behaviors worsen in the evening. Today patient states \"yes\" when asked if he feels anxious. He denies pain.   Breathing is not labored.   No nsg concerns.     Past Medical and Surgical History reviewed in Epic today.    MEDICATIONS:  Current Outpatient Medications   Medication Sig Dispense Refill     ACE/ARB/ARNI NOT PRESCRIBED, INTENTIONAL, Please choose reason not prescribed, below       acetaminophen (TYLENOL) 325 MG tablet Take 2 tablets (650 mg) by mouth every 4 hours as needed for mild pain       CELLCEPT (BRAND) 250 MG capsule Take 3 capsules (750 mg) by mouth 2 times daily 540 capsule 3     Cholecalciferol (VITAMIN D) 1000 UNIT capsule Take 1 capsule by mouth 2 times daily.       divalproex sodium delayed-release (DEPAKOTE) 125 MG DR tablet Take 1 tablet (125 mg) by mouth 3 times daily       ELIQUIS 2.5 MG tablet TAKE 1 TABLET BY MOUTH 2 TIMES DAILY 180 tablet 1     ferrous sulfate (FE TABS) 325 (65 Fe) MG EC tablet Take 1 tablet (325 mg) by mouth daily       gabapentin (NEURONTIN) 300 MG capsule Take 1 capsule (300 mg) by mouth 2 times daily       levothyroxine (SYNTHROID/LEVOTHROID) 50 MCG tablet Take 1 tablet (50 mcg) by mouth daily " 90 tablet 1     melatonin 3 MG tablet Take 1 tablet (3 mg) by mouth At Bedtime       ondansetron (ZOFRAN) 4 MG tablet Take 1 tablet (4 mg) by mouth every 6 hours as needed for nausea       predniSONE (DELTASONE) 5 MG tablet Take 1 tablet (5 mg) by mouth daily       QUEtiapine (SEROQUEL) 25 MG tablet Take 0.5 tablets (12.5 mg) by mouth daily. May also take 0.5 tablets (12.5 mg) 2 times daily as needed (restlessnes and agitation).       vitamin B-Complex Take 1 tablet by mouth daily         REVIEW OF SYSTEMS:  Unobtainable secondary to cognitive impairment.     Objective:  /72   Pulse 67   Temp 98.2  F (36.8  C)   Resp 17   Wt 55.8 kg (123 lb)   SpO2 98%   BMI 22.50 kg/m     Exam:  GENERAL APPEARANCE:  anxious, uncooperative  RESP:  respiratory effort and palpation of chest normal, lungs clear to auscultation   CV:  Palpation and auscultation of heart done , irregular rhythm tachycardic  ABDOMEN:  no guarding or rebound, bowel sounds normal  SKIN:  no rashes or lesions to exposed skin  PSYCH:  insight and judgement impaired, memory impaired     Labs:   CBC RESULTS:   Recent Labs   Lab Test 07/01/19  1059 06/26/19  1125   WBC 8.7 11.2*   RBC 4.06* 4.45   HGB 11.8* 12.9*   HCT 39.9* 43.6   MCV 98 98   MCH 29.1 29.0   MCHC 29.6* 29.6*   RDW 15.5* 15.0    219       Last Basic Metabolic Panel:  Recent Labs   Lab Test 07/01/19  1059 06/26/19  1125    137   POTASSIUM 4.3 4.0   CHLORIDE 101 101   SG 9.0 9.5   CO2 32 32   BUN 26 24   CR 1.07 0.95   * 187*       Liver Function Studies -   Recent Labs   Lab Test 04/22/19  1603 12/19/18  1529   PROTTOTAL 6.9 7.4   ALBUMIN 3.6 3.6   BILITOTAL 0.3 0.4   ALKPHOS 103 103   AST 25 18   ALT 22 30       TSH   Date Value Ref Range Status   05/01/2018 1.11 0.40 - 4.00 mU/L Final   02/02/2009 2.01 0.4 - 5.0 mU/L Final   ]    Lab Results   Component Value Date    A1C 5.9 04/01/2019    A1C 5.7 05/01/2018       ASSESSMENT/PLAN:  Non-small cell carcinoma of  lung (H)  - following with oncology. Has received 10 radiation treatments  - will advanced dementia and multiple chronic conditions consider hospice    Late onset Alzheimer's disease with behavioral disturbance  - impulsive and aggressive at times. Continue Depakote and Seroquel for now    Liver replaced by transplant (H)  - s/p transplant 2005  - on 5 mg prednisone every day for anti- rejection    Chronic atrial fibrillation (H)  Essential hypertension  - apical pulse tachy today, not on a betablocker, monitor. Nsg to update with concerns or HR >100  - on apixaban      Acquired hypothyroidism  - TSH 4.08 (4/23/19), continue synthroid  - goal TSH 5-6 with afib, consider dose reduction  - labs ordered        transcribed by : Sabrina Encarnacion  1. CBC, CMP, TSH, B12, Folate, Vit D level Dx Dementia and Vit D def  2. OT please email NP if any cog scores.      Electronically signed by:  ANIL Thompson CNP               Sincerely,        ANIL Thompson CNP
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
## 2019-07-15 NOTE — PROGRESS NOTES
[place]  PRIMARY CARE PROVIDER AND CLINIC:  Breana Gurrola, ANIL CNP, [address] / [place] [address]  Chief Complaint   Patient presents with     Hospital F/U     [facility] Medical Record Number:  [id number]  Place of Service where encounter took place[facility] [[id number]]    Aman Baker  is a 85 year old  (5/6/1934), admitted to the above facility from  [facility] . Hospital stay 5/23/2019 through 6/21/2019..  Admitted to this facility for  rehab, medical management and nursing care.    HPI:    HPI information obtained from: facility staff, patient report and [facility] chart review.   Brief Summary of Hospital Course:   - Pt past medical history notable for progressive dementia, multiple cancers admitted to the hospital for sepsis secondary to pneumonia, complicated with delirium, new finding with stage IV completed RTx 10 sessions.    Updates on Status Since Skilled nursing Admission:   - admitted to TCU, however continued to be impulsive and physically aggressive, non rehab-able, transferred to [facility]. Started on lexapro, Seroquel increased.   - Resident seen and examined. Daughter the bedside report her father reached plateau with therapy and stopped, but feels it is because he is hard of hearing and staff were not talking loud enough, also added his behavior is better now and would like to see him able to walk-his baseline, and take him home. Reports his ears get infected with yeast and bacteria very quickly, has brought two tubes used to have at home to help with any ear infection.   - appetite is fine, not in pain, sleeps well.     CODE STATUS/ADVANCE DIRECTIVES DISCUSSION:   DNR only  Patient's living condition: lives alone  ALLERGIES: Penicillins and Sulfa drugs  PAST MEDICAL HISTORY:  has a past medical history of Chronic atrial fibrillation (H) (11/21/2012) and Prostate cancer (H) (1/2013).  PAST SURGICAL HISTORY:   has a past surgical  history that includes transplant (2004).  FAMILY HISTORY: family history is not on file.  SOCIAL HISTORY:   reports that he has never smoked. He has never used smokeless tobacco. He reports that he does not drink alcohol or use drugs.    Post Discharge Medication Reconciliation Status: discharge medications reconciled and changed, per note/orders (see AVS)  Current Outpatient Medications   Medication Sig Dispense Refill     acetaminophen (TYLENOL) 325 MG tablet Take 2 tablets (650 mg) by mouth every 4 hours as needed for mild pain       bacitracin 500 UNIT/GM OINT Apply to B/L ear 3 times daily       bisacodyl (DULCOLAX) 10 MG suppository Place 10 mg rectally daily as needed for constipation       CELLCEPT (BRAND) 250 MG capsule Take 3 capsules (750 mg) by mouth 2 times daily 540 capsule 3     Cholecalciferol (VITAMIN D) 1000 UNIT capsule Take 1 capsule by mouth 2 times daily.       divalproex sodium delayed-release (DEPAKOTE) 125 MG DR tablet Take 1 tablet (125 mg) by mouth 3 times daily       ELIQUIS 2.5 MG tablet TAKE 1 TABLET BY MOUTH 2 TIMES DAILY 180 tablet 1     escitalopram (LEXAPRO) 5 MG tablet Take 5 mg by mouth daily Then take 10 mg by mouth 1 time a day       ferrous sulfate (FE TABS) 325 (65 Fe) MG EC tablet Take 1 tablet (325 mg) by mouth daily       gabapentin (NEURONTIN) 300 MG capsule Take 1 capsule (300 mg) by mouth 2 times daily       levothyroxine (SYNTHROID/LEVOTHROID) 50 MCG tablet Take 25 mcg by mouth daily       magnesium hydroxide (MILK OF MAGNESIA) 400 MG/5ML suspension Take 30 mLs by mouth daily as needed for constipation or heartburn       ondansetron (ZOFRAN) 4 MG tablet Take 4 mg by mouth every 4 hours as needed for nausea       polyethylene glycol (MIRALAX/GLYCOLAX) packet Take 1 packet by mouth as needed for constipation       predniSONE (DELTASONE) 5 MG tablet Take 1 tablet (5 mg) by mouth daily       QUEtiapine (SEROQUEL) 25 MG tablet Take 25 mg by mouth daily Take at 1900        "traMADol (ULTRAM) 50 MG tablet Take 50 mg by mouth 2 times daily        vitamin B-Complex Take 1 tablet by mouth daily       ACE/ARB/ARNI NOT PRESCRIBED, INTENTIONAL, Please choose reason not prescribed, below (Patient not taking: Reported on 7/16/2019)       levothyroxine (SYNTHROID/LEVOTHROID) 50 MCG tablet Take 1 tablet (50 mcg) by mouth daily (Patient not taking: Reported on 7/16/2019) 90 tablet 1     melatonin 3 MG tablet Take 1 tablet (3 mg) by mouth At Bedtime (Patient not taking: Reported on 7/16/2019)       ondansetron (ZOFRAN) 4 MG tablet Take 1 tablet (4 mg) by mouth every 6 hours as needed for nausea (Patient not taking: Reported on 7/16/2019)       QUEtiapine (SEROQUEL) 25 MG tablet Take 0.5 tablets (12.5 mg) by mouth daily. May also take 0.5 tablets (12.5 mg) 2 times daily as needed (restlessnes and agitation). (Patient not taking: No sig reported)       traMADol (ULTRAM) 50 MG tablet Take 0.5-1 tablets (25-50 mg) by mouth 2 times daily as needed for severe pain 60 tablet 5     ROS: .  Unobtainable secondary to cognitive impairment.     Vitals:  BP 95/65   Pulse 98   Temp 97.8  F (36.6  C)   Resp 16   Ht 1.702 m (5' 7\")   Wt 58.2 kg (128 lb 6.4 oz)   SpO2 97%   BMI 20.11 kg/m    Exam:  GENERAL APPEARANCE:  in no distress, sitting on the chair  ENT:  Mouth and posterior oropharynx normal, moist mucous membranes, oral mucosa moist, scab over the outer pinna on R ear.   EYES:  EOMI, Pupil rounded and equal.  RESP:  lungs clear to auscultation   CV:  S1S2 audible, regular HR, no murmur appreciated.   ABDOMEN:  soft, NT/ND, BS audible. no mass appreciated on palpation.   M/S:   no joint deformity noted on observation.   SKIN:  No rash.   NEURO:   No NFD appreciated on observation. Hand  5/5 b/l  PSYCH:  Speech clear, irrelevant answers, gets distracted easily. impaired insight, judgement and memory, affect and mood euphoric    Lab/Diagnostic data: Reviewed in the chart and EHR.  "       ASSESSMENT/PLAN:  Physical deconditioning: reached plateau with therapy, non rehab-able given advanced dementia. Explained to daughter. Explained focus is comfort care.     Chronic atrial fibrillation (H): on Eliquis. Not on rate control.     Late onset Alzheimer's disease with behavioral disturbance  - on Seroquel and lexapro. stable  - Continue to anticipate needs. Chronic condition, ongoing decline expected.   -  Continue to provide redirection and reassurance as needed. Maintain safe living situation with goals focused on comfort.     Non-small cell carcinoma of lung (H) /Liver replaced by transplant (H): on mycophenolate and prednisone.     Hospice care: Symptoms managed by  GNP and Hospice Team.      transcribed by : Ruby Caro  Orders:  1. Bacitracin Plus - TID b/l ear prn - Infection/rash  2. Wipe external ear with witch hazel using q-tip at bedtime  3. Remove hearing aid at night  3. See above, otherwise, continue the rest of the current POC.       Electronically signed by:  Zacarias Pisano MD

## 2019-07-16 NOTE — LETTER
7/16/2019        RE: Aman Baker  [address] [facility] [address]        [place] [facility]  PRIMARY CARE PROVIDER AND CLINIC:  ANIL Thompson CNP, [address] [address] / [place] [address]  Chief Complaint   Patient presents with     Hospital F/U     [facility] Medical Record Number:  [id number]  [facility] [[id number][facility]    Aman Baker  is a 85 year old  (5/6/1934), admitted to the above facility from  [facility] . Hospital stay 5/23/2019 through 6/21/2019..  Admitted to this facility for  rehab, medical management and nursing care.    HPI:    HPI information obtained from: facility staff, patient report and [facility] chart review.   Brief Summary of Hospital Course:   - Pt past medical history notable for progressive dementia, multiple cancers admitted to the hospital for sepsis secondary to pneumonia, complicated with delirium, new finding with stage IV completed RTx 10 sessions.    Updates on Status Since Skilled nursing Admission:   - admitted to TCU, however continued to be impulsive and physically aggressive, non rehab-able, transferred to [facility]. Started on lexapro, Seroquel increased.   - Resident seen and examined. Daughter the bedside report her father reached plateau with therapy and stopped, but feels it is because he is hard of hearing and staff were not talking loud enough, also added his behavior is better now and would like to see him able to walk-his baseline, and take him home. Reports his ears get infected with yeast and bacteria very quickly, has brought two tubes used to have at home to help with any ear infection.   - appetite is fine, not in pain, sleeps well.     CODE STATUS/ADVANCE DIRECTIVES DISCUSSION:   DNR only  Patient's living condition: lives alone  ALLERGIES: Penicillins and Sulfa drugs  PAST MEDICAL HISTORY:  has a past medical history of Chronic atrial fibrillation  (H) (11/21/2012) and Prostate cancer (H) (1/2013).  PAST SURGICAL HISTORY:   has a past surgical history that includes transplant (2004).  FAMILY HISTORY: family history is not on file.  SOCIAL HISTORY:   reports that he has never smoked. He has never used smokeless tobacco. He reports that he does not drink alcohol or use drugs.    Post Discharge Medication Reconciliation Status: discharge medications reconciled and changed, per note/orders (see AVS)  Current Outpatient Medications   Medication Sig Dispense Refill     acetaminophen (TYLENOL) 325 MG tablet Take 2 tablets (650 mg) by mouth every 4 hours as needed for mild pain       bacitracin 500 UNIT/GM OINT Apply to B/L ear 3 times daily       bisacodyl (DULCOLAX) 10 MG suppository Place 10 mg rectally daily as needed for constipation       CELLCEPT (BRAND) 250 MG capsule Take 3 capsules (750 mg) by mouth 2 times daily 540 capsule 3     Cholecalciferol (VITAMIN D) 1000 UNIT capsule Take 1 capsule by mouth 2 times daily.       divalproex sodium delayed-release (DEPAKOTE) 125 MG DR tablet Take 1 tablet (125 mg) by mouth 3 times daily       ELIQUIS 2.5 MG tablet TAKE 1 TABLET BY MOUTH 2 TIMES DAILY 180 tablet 1     escitalopram (LEXAPRO) 5 MG tablet Take 5 mg by mouth daily Then take 10 mg by mouth 1 time a day       ferrous sulfate (FE TABS) 325 (65 Fe) MG EC tablet Take 1 tablet (325 mg) by mouth daily       gabapentin (NEURONTIN) 300 MG capsule Take 1 capsule (300 mg) by mouth 2 times daily       levothyroxine (SYNTHROID/LEVOTHROID) 50 MCG tablet Take 25 mcg by mouth daily       magnesium hydroxide (MILK OF MAGNESIA) 400 MG/5ML suspension Take 30 mLs by mouth daily as needed for constipation or heartburn       ondansetron (ZOFRAN) 4 MG tablet Take 4 mg by mouth every 4 hours as needed for nausea       polyethylene glycol (MIRALAX/GLYCOLAX) packet Take 1 packet by mouth as needed for constipation       predniSONE (DELTASONE) 5 MG tablet Take 1 tablet (5 mg) by  "mouth daily       QUEtiapine (SEROQUEL) 25 MG tablet Take 25 mg by mouth daily Take at 1900       traMADol (ULTRAM) 50 MG tablet Take 50 mg by mouth 2 times daily        vitamin B-Complex Take 1 tablet by mouth daily       ACE/ARB/ARNI NOT PRESCRIBED, INTENTIONAL, Please choose reason not prescribed, below (Patient not taking: Reported on 7/16/2019)       levothyroxine (SYNTHROID/LEVOTHROID) 50 MCG tablet Take 1 tablet (50 mcg) by mouth daily (Patient not taking: Reported on 7/16/2019) 90 tablet 1     melatonin 3 MG tablet Take 1 tablet (3 mg) by mouth At Bedtime (Patient not taking: Reported on 7/16/2019)       ondansetron (ZOFRAN) 4 MG tablet Take 1 tablet (4 mg) by mouth every 6 hours as needed for nausea (Patient not taking: Reported on 7/16/2019)       QUEtiapine (SEROQUEL) 25 MG tablet Take 0.5 tablets (12.5 mg) by mouth daily. May also take 0.5 tablets (12.5 mg) 2 times daily as needed (restlessnes and agitation). (Patient not taking: No sig reported)       traMADol (ULTRAM) 50 MG tablet Take 0.5-1 tablets (25-50 mg) by mouth 2 times daily as needed for severe pain 60 tablet 5     ROS: .  Unobtainable secondary to cognitive impairment.     Vitals:  BP 95/65   Pulse 98   Temp 97.8  F (36.6  C)   Resp 16   Ht 1.702 m (5' 7\")   Wt 58.2 kg (128 lb 6.4 oz)   SpO2 97%   BMI 20.11 kg/m     Exam:  GENERAL APPEARANCE:  in no distress, sitting on the chair  ENT:  Mouth and posterior oropharynx normal, moist mucous membranes, oral mucosa moist, scab over the outer pinna on R ear.   EYES:  EOMI, Pupil rounded and equal.  RESP:  lungs clear to auscultation   CV:  S1S2 audible, regular HR, no murmur appreciated.   ABDOMEN:  soft, NT/ND, BS audible. no mass appreciated on palpation.   M/S:   no joint deformity noted on observation.   SKIN:  No rash.   NEURO:   No NFD appreciated on observation. Hand  5/5 b/l  PSYCH:  Speech clear, irrelevant answers, gets distracted easily. impaired insight, judgement and memory, " affect and mood euphoric    Lab/Diagnostic data: Reviewed in the chart and EHR.        ASSESSMENT/PLAN:  Physical deconditioning: reached plateau with therapy, non rehab-able given advanced dementia. Explained to daughter. Explained focus is comfort care.     Chronic atrial fibrillation (H): on Eliquis. Not on rate control.     Late onset Alzheimer's disease with behavioral disturbance  - on Seroquel and lexapro. stable  - Continue to anticipate needs. Chronic condition, ongoing decline expected.   -  Continue to provide redirection and reassurance as needed. Maintain safe living situation with goals focused on comfort.     Non-small cell carcinoma of lung (H) /Liver replaced by transplant (H): on mycophenolate and prednisone.     Hospice care: Symptoms managed by  GNP and Hospice Team.      transcribed by : Ruby Caro  Orders:  1. Bacitracin Plus - TID b/l ear prn - Infection/rash  2. Wipe external ear with witch hazel using q-tip at bedtime  3. Remove hearing aid at night  3. See above, otherwise, continue the rest of the current POC.       Electronically signed by:  Zacarias Pisano MD                       Sincerely,        Zacarias Pisano MD

## 2019-07-18 NOTE — TELEPHONE ENCOUNTER
Patient's daughter called requesting repair of his left hearing aid. She reports the hearing aid was broken at the nursing home. She will drop off the hearing aid at the specialty clinic  for me to send in for repair.     Annamaria CHA, #[id number]

## 2019-07-18 NOTE — TELEPHONE ENCOUNTER
Pt's hearing aid is broken. He has been seen in [facility] and would like to switch care to [facility] due to pt being located at [facility] in [facility] currently.     Please call pt's daughter, Kathleen, @:  [phone] (ok to leave message) to discuss whether or not pt will need an appointment or not.     Denise Behrendt  Specialty CSS

## 2019-07-22 NOTE — PROGRESS NOTES
[facility]         SUBJECTIVE:  Aman Baker, 85 year old male requests repair of his right 2017 Phonak Virto V50-FS hearing aid. Daughter reports the hearing aid was damaged at the nursing home.    OBJECTIVE:  Examination reveals a damaged hearing aid case. Original warranty  3/23/2019. Hearing aid was sent to [email or web address] for 12 month repair.     ASSESSMENT/PLAN:   See chart in the hearing aid room. Daughter will be contacted when the hearing aid is ready to be picked up at the specialty clinic .    Annamaria SUTHERLAND-JOSH, #[id number]

## 2019-07-30 NOTE — PROGRESS NOTES
[facility]         SUBJECTIVE:  Aman Baker, 85 year old male is contacted to  his repaired right 2017 Phonak VIrto V50-13 hearing aid.Original hearing aid warranty  3/23/2019. Hearing aids were fit at another [place] facility.  Hearing aid shell was damaged at his nursing home.     OBJECTIVE:  Verified hearing aid functionality. Patient was given description of repair done on his right hearing aid. Repair warranty expires 2020.    ASSESSMENT/PLAN:    Patient's daughter will  hearing aid at the specialty clinic . See chart in the hearing aid room.     Annamaria SUTHERLAND-JOSH, #[id number]

## 2019-08-07 NOTE — TELEPHONE ENCOUNTER
Patient Call: please call daughter Kathleen# [phone] (many issues)        Call back needed? Yes    Return Call Needed  Same as documented in contacts section  When to return call?: Same day: [facility]

## 2019-08-07 NOTE — TELEPHONE ENCOUNTER
Spoke with pt's daughter Kathleen. Pt has started hospice care at TCU facility due to lung cancer. Pt's family calling to notify we can cancel all follow up appts at the [facility].

## 2019-08-14 NOTE — PROGRESS NOTES
[facility]  Chief Complaint   Patient presents with     prison Regulatory     [facility] Record Number:  [id number]  Place of Service where encounter took place:  [facility] [[id number]]    HPI:    Aman Baker  is 85 year old (5/6/1934), who is being seen today for a federally mandated E/M visit.  HPI information obtained from: facility chart records, facility staff, patient report and [facility] chart review.     Seeing patient today for a regulatory visit.   nsg reports a skin tear to left upper arm, they are providing daily drsg changes.   No reported mood or behavioral concerns. Ongoing periods of agitation. Patient is not ambulating.   No signs of pain.   No reported bowel or bladder concerns.     ALLERGIES:Penicillins and Sulfa drugs  PAST MEDICAL HISTORY:   has a past medical history of Chronic atrial fibrillation (H) (11/21/2012) and Prostate cancer (H) (1/2013).  PAST SURGICAL HISTORY:   has a past surgical history that includes transplant (2004).  FAMILY HISTORY: family history is not on file.  SOCIAL HISTORY:  reports that he has never smoked. He has never used smokeless tobacco. He reports that he does not drink alcohol or use drugs.    MEDICATIONS:  Current Outpatient Medications   Medication Sig Dispense Refill     acetaminophen (TYLENOL) 325 MG tablet Take 2 tablets (650 mg) by mouth every 4 hours as needed for mild pain       bisacodyl (DULCOLAX) 10 MG suppository Place 10 mg rectally daily as needed for constipation       divalproex sodium delayed-release (DEPAKOTE) 125 MG DR tablet Take 1 tablet (125 mg) by mouth 3 times daily       docusate sodium (COLACE) 100 MG capsule Take 100 mg by mouth daily       ELIQUIS 2.5 MG tablet TAKE 1 TABLET BY MOUTH 2 TIMES DAILY 180 tablet 1     escitalopram (LEXAPRO) 5 MG tablet Take 10 mg by mouth daily        gabapentin (NEURONTIN) 300 MG capsule Take 1 capsule (300 mg) by mouth 2 times daily        "haloperidol (HALDOL) 0.5 MG tablet Take 0.5 mg by mouth 3 times daily       levothyroxine (SYNTHROID/LEVOTHROID) 50 MCG tablet Take 25 mcg by mouth daily       LORazepam (ATIVAN) 1 MG tablet Take 1 mg by mouth every morning Take on Mon, Wed       magnesium hydroxide (MILK OF MAGNESIA) 400 MG/5ML suspension Take 30 mLs by mouth daily as needed for constipation or heartburn       ondansetron (ZOFRAN) 4 MG tablet Take 4 mg by mouth every 4 hours as needed for nausea       polyethylene glycol (MIRALAX/GLYCOLAX) packet Take 1 packet by mouth as needed for constipation       predniSONE (DELTASONE) 5 MG tablet Take 1 tablet (5 mg) by mouth daily       traMADol (ULTRAM) 50 MG tablet Take 50 mg by mouth 3 times daily as needed And take 50 mg by mouth every 12 hours as needed       Cholecalciferol (VITAMIN D) 1000 UNIT capsule Take 1 capsule by mouth 2 times daily.         Case Management:  I have reviewed the care plan and MDS and do agree with the plan. Patient's desire to return to the community is not assessible due to cognitive impairment. Information reviewed:  Medications, vital signs, orders, and nursing notes.    ROS:  Unobtainable secondary to cognitive impairment.     Vitals:  /65   Pulse 78   Temp 97.3  F (36.3  C)   Resp 16   Ht 1.702 m (5' 7\")   Wt 53.9 kg (118 lb 14.4 oz)   SpO2 96%   BMI 18.62 kg/m    Body mass index is 18.62 kg/m .  Exam:  GENERAL APPEARANCE:  uncooperative, cachetic appearing  RESP:  respiratory effort and palpation of chest normal, lungs clear to auscultation   CV:  Palpation and auscultation of heart done , irregular rhythm .  ABDOMEN:  no guarding or rebound, bowel sounds normal  SKIN:  large skin tear to right upper arm, no drainage or bleeding  PSYCH:  does not follow commands, calm    Lab/Diagnostic data:   CBC RESULTS:   Recent Labs   Lab Test 07/08/19  1138 07/01/19  1059   WBC 7.5 8.7   RBC 3.96* 4.06*   HGB 11.7* 11.8*   HCT 38.6* 39.9*   MCV 98 98   MCH 29.5 29.1 "   MCHC 30.3* 29.6*   RDW 15.4* 15.5*    197       Last Basic Metabolic Panel:  Recent Labs   Lab Test 07/08/19  1138 07/01/19  1059    136   POTASSIUM 4.2 4.3   CHLORIDE 100 101   SG 9.3 9.0   CO2 31 32   BUN 24 26   CR 1.05 1.07   * 109*       Liver Function Studies -   Recent Labs   Lab Test 07/08/19  1138 04/22/19  1603   PROTTOTAL 6.9 6.9   ALBUMIN 3.4 3.6   BILITOTAL 0.4 0.3   ALKPHOS 79 103   AST 24 25   ALT 34 22       TSH   Date Value Ref Range Status   07/08/2019 1.91 0.40 - 4.00 mU/L Final   05/01/2018 1.11 0.40 - 4.00 mU/L Final       Lab Results   Component Value Date    A1C 5.9 04/01/2019    A1C 5.7 05/01/2018       ASSESSMENT/PLAN  Non-small cell carcinoma of lung (H)  Hospice care patient  - previously following with oncology, received 10 radiation tx's  - has started on hospice ([facility]) with goal of comfort. Family desires to have patient return home for end of life    Late onset Alzheimer's disease with behavioral disturbance  - impulsive and aggressive at times. On lexapro, haldol and Depakote. Hospice using ativan prior to bathing  - staff/ hospice to update with concerns.     Liver replaced by transplant (H)  - s/p transplant 2005  - on 5 mg prednisone every day for anti- rejection    Moderate protein-calorie malnutrition (H)  - 2/2 to end of life and poor intake  - expect decline    Chronic atrial fibrillation (H)  - on eliquis, rate controlled- on no meds    Age-related osteoporosis with current pathological fracture, sequela  - hx of chronic pain, pain controlled    Acquired hypothyroidism  - TSH 4.08 (4/23/19), continue synthroid  - goal TSH 5-6 with afib, consider dose reduction      Electronically signed by:  ANIL Thompson CNP

## 2019-08-15 PROBLEM — E46 PROTEIN-CALORIE MALNUTRITION (H): Status: ACTIVE | Noted: 2019-01-01

## 2019-08-15 PROBLEM — E88.01 ALPHA-1-ANTITRYPSIN DEFICIENCY (H): Status: RESOLVED | Noted: 2018-03-23 | Resolved: 2019-01-01

## 2019-08-15 NOTE — LETTER
8/15/2019        RE: Aman Baker  [address] [facility] [address]        [facility]  Chief Complaint   Patient presents with     skilled nursing Regulatory     [facility] Record Number:  [id number]  Place of Service where encounter took place[facility] [[id number]]    HPI:    Aman Baker  is 85 year old (5/6/1934), who is being seen today for a federally mandated E/M visit.  HPI information obtained from: facility chart records, facility staff, patient report and [facility] chart review.     Seeing patient today for a regulatory visit.   nsg reports a skin tear to left upper arm, they are providing daily drsg changes.   No reported mood or behavioral concerns. Ongoing periods of agitation. Patient is not ambulating.   No signs of pain.   No reported bowel or bladder concerns.     ALLERGIES:Penicillins and Sulfa drugs  PAST MEDICAL HISTORY:   has a past medical history of Chronic atrial fibrillation (H) (11/21/2012) and Prostate cancer (H) (1/2013).  PAST SURGICAL HISTORY:   has a past surgical history that includes transplant (2004).  FAMILY HISTORY: family history is not on file.  SOCIAL HISTORY:  reports that he has never smoked. He has never used smokeless tobacco. He reports that he does not drink alcohol or use drugs.    MEDICATIONS:  Current Outpatient Medications   Medication Sig Dispense Refill     acetaminophen (TYLENOL) 325 MG tablet Take 2 tablets (650 mg) by mouth every 4 hours as needed for mild pain       bisacodyl (DULCOLAX) 10 MG suppository Place 10 mg rectally daily as needed for constipation       divalproex sodium delayed-release (DEPAKOTE) 125 MG DR tablet Take 1 tablet (125 mg) by mouth 3 times daily       docusate sodium (COLACE) 100 MG capsule Take 100 mg by mouth daily       ELIQUIS 2.5 MG tablet TAKE 1 TABLET BY MOUTH 2 TIMES DAILY 180 tablet 1     escitalopram (LEXAPRO) 5 MG tablet Take 10 mg by mouth daily     "    gabapentin (NEURONTIN) 300 MG capsule Take 1 capsule (300 mg) by mouth 2 times daily       haloperidol (HALDOL) 0.5 MG tablet Take 0.5 mg by mouth 3 times daily       levothyroxine (SYNTHROID/LEVOTHROID) 50 MCG tablet Take 25 mcg by mouth daily       LORazepam (ATIVAN) 1 MG tablet Take 1 mg by mouth every morning Take on Mon, Wed       magnesium hydroxide (MILK OF MAGNESIA) 400 MG/5ML suspension Take 30 mLs by mouth daily as needed for constipation or heartburn       ondansetron (ZOFRAN) 4 MG tablet Take 4 mg by mouth every 4 hours as needed for nausea       polyethylene glycol (MIRALAX/GLYCOLAX) packet Take 1 packet by mouth as needed for constipation       predniSONE (DELTASONE) 5 MG tablet Take 1 tablet (5 mg) by mouth daily       traMADol (ULTRAM) 50 MG tablet Take 50 mg by mouth 3 times daily as needed And take 50 mg by mouth every 12 hours as needed       Cholecalciferol (VITAMIN D) 1000 UNIT capsule Take 1 capsule by mouth 2 times daily.         Case Management:  I have reviewed the care plan and MDS and do agree with the plan. Patient's desire to return to the community is not assessible due to cognitive impairment. Information reviewed:  Medications, vital signs, orders, and nursing notes.    ROS:  Unobtainable secondary to cognitive impairment.     Vitals:  /65   Pulse 78   Temp 97.3  F (36.3  C)   Resp 16   Ht 1.702 m (5' 7\")   Wt 53.9 kg (118 lb 14.4 oz)   SpO2 96%   BMI 18.62 kg/m     Body mass index is 18.62 kg/m .  Exam:  GENERAL APPEARANCE:  uncooperative, cachetic appearing  RESP:  respiratory effort and palpation of chest normal, lungs clear to auscultation   CV:  Palpation and auscultation of heart done , irregular rhythm .  ABDOMEN:  no guarding or rebound, bowel sounds normal  SKIN:  large skin tear to right upper arm, no drainage or bleeding  PSYCH:  does not follow commands, calm    Lab/Diagnostic data:   CBC RESULTS:   Recent Labs   Lab Test 07/08/19  1138 07/01/19  1059 "   WBC 7.5 8.7   RBC 3.96* 4.06*   HGB 11.7* 11.8*   HCT 38.6* 39.9*   MCV 98 98   MCH 29.5 29.1   MCHC 30.3* 29.6*   RDW 15.4* 15.5*    197       Last Basic Metabolic Panel:  Recent Labs   Lab Test 07/08/19  1138 07/01/19  1059    136   POTASSIUM 4.2 4.3   CHLORIDE 100 101   SG 9.3 9.0   CO2 31 32   BUN 24 26   CR 1.05 1.07   * 109*       Liver Function Studies -   Recent Labs   Lab Test 07/08/19  1138 04/22/19  1603   PROTTOTAL 6.9 6.9   ALBUMIN 3.4 3.6   BILITOTAL 0.4 0.3   ALKPHOS 79 103   AST 24 25   ALT 34 22       TSH   Date Value Ref Range Status   07/08/2019 1.91 0.40 - 4.00 mU/L Final   05/01/2018 1.11 0.40 - 4.00 mU/L Final       Lab Results   Component Value Date    A1C 5.9 04/01/2019    A1C 5.7 05/01/2018       ASSESSMENT/PLAN  Non-small cell carcinoma of lung (H)  Hospice care patient  - previously following with oncology, received 10 radiation tx's  - has started on hospice ([facility]) with goal of comfort. Family desires to have patient return home for end of life    Late onset Alzheimer's disease with behavioral disturbance  - impulsive and aggressive at times. On lexapro, haldol and Depakote. Hospice using ativan prior to bathing  - staff/ hospice to update with concerns.     Liver replaced by transplant (H)  - s/p transplant 2005  - on 5 mg prednisone every day for anti- rejection    Moderate protein-calorie malnutrition (H)  - 2/2 to end of life and poor intake  - expect decline    Chronic atrial fibrillation (H)  - on eliquis, rate controlled- on no meds    Age-related osteoporosis with current pathological fracture, sequela  - hx of chronic pain, pain controlled    Acquired hypothyroidism  - TSH 4.08 (4/23/19), continue synthroid  - goal TSH 5-6 with afib, consider dose reduction      Electronically signed by:  Breana Gurrola, ANIL CNP            Sincerely,        Breana Gurrola, ANIL CNP

## 2019-08-22 NOTE — TELEPHONE ENCOUNTER
Pt has been admitted to long term care now. Pt needs his cellcept in a liquid from. Please connect with the pt's daughter when available

## 2019-08-22 NOTE — TELEPHONE ENCOUNTER
Spoke with daughter, pt needs liquid cellcept but needs a signed script sent to .barbara would like a copy emailed to her at    [email or web address] so she [email or web address] bring to care faciliy

## 2019-09-09 ENCOUNTER — POST MORTEM DOCUMENTATION (OUTPATIENT)
Dept: TRANSPLANT | Facility: CLINIC | Age: 84
End: 2019-09-09

## 2019-09-09 NOTE — PROGRESS NOTES
Received notification on  of patient's death from [email or web address] in basket message   Place of death was reported as facility, pt was on hospice  Graft status at the time of death was reported as Functioning.  Additional information: pt chose hospice due to lung cancer  TIS verification is: Pending  The Transplant Office has been notified that patient is . The Post Mortem Encounter has been completed. Notifications have been sent to the Care team and Social Work.   Instructions have been sent to cancel pending appointments, discontinue pending orders, eliminate paper chart and send a sympathy card to the family.    CIARA HDZCON    Received notification of patient's death from Rody Aranda.  Place of death was reported as [facility] on the [facility].  Graft status at the time of death was reported as Functioning.  Additional information: See Care Everywhere  TIS verification is: Complete

## 2020-02-11 ENCOUNTER — TELEPHONE (OUTPATIENT)
Dept: OTOLARYNGOLOGY | Facility: CLINIC | Age: 85
End: 2020-02-11

## 2020-02-13 ENCOUNTER — TELEPHONE (OUTPATIENT)
Dept: AUDIOLOGY | Facility: CLINIC | Age: 85
End: 2020-02-13

## 2020-02-13 NOTE — TELEPHONE ENCOUNTER
Pt's daughter, Kathleen, calling:  Pt was in [facility] before he passed away and hearing aids were broken - they were brought here to be repaired- charge of $350    She is asking for a letter stating the $350 was for hearing aid repair - Kathleen is trying to submit the claim to pt's HSA/[facility]. She is requesting this letter by 02/21 - Letter can be emailed to:  [email or web address]    Please contact Kahtleen @:  [phone] (ok to leave message)    Denise Behrendt  [facility]

## 2020-02-13 NOTE — TELEPHONE ENCOUNTER
Left message for patient to call the C[employer] to obtain a receipt for the repair.     Annam[facility], #[id number]

## 2023-11-27 NOTE — Clinical Note
Dr Matt and AMBER Pierre.Jennifer, please do card.ISR2, patient was on hospice for lung cancer
Post Mortem complete per [facility]
Previously Negative (within the last year)
